# Patient Record
Sex: FEMALE | Race: WHITE | NOT HISPANIC OR LATINO | Employment: UNEMPLOYED | URBAN - METROPOLITAN AREA
[De-identification: names, ages, dates, MRNs, and addresses within clinical notes are randomized per-mention and may not be internally consistent; named-entity substitution may affect disease eponyms.]

---

## 2020-07-27 DIAGNOSIS — Z12.31 ENCOUNTER FOR SCREENING MAMMOGRAM FOR MALIGNANT NEOPLASM OF BREAST: Primary | ICD-10-CM

## 2020-07-28 ENCOUNTER — HOSPITAL ENCOUNTER (OUTPATIENT)
Dept: RADIOLOGY | Facility: HOSPITAL | Age: 60
Discharge: HOME | End: 2020-07-28
Attending: SPECIALIST
Payer: COMMERCIAL

## 2020-07-28 DIAGNOSIS — Z12.31 ENCOUNTER FOR SCREENING MAMMOGRAM FOR MALIGNANT NEOPLASM OF BREAST: ICD-10-CM

## 2020-07-28 PROCEDURE — 77067 SCR MAMMO BI INCL CAD: CPT | Mod: TC,PO

## 2020-09-28 DIAGNOSIS — M81.0 SENILE OSTEOPOROSIS: Primary | ICD-10-CM

## 2020-10-05 ENCOUNTER — HOSPITAL ENCOUNTER (OUTPATIENT)
Dept: RADIOLOGY | Facility: HOSPITAL | Age: 60
Discharge: HOME | End: 2020-10-05
Attending: INTERNAL MEDICINE

## 2020-10-05 DIAGNOSIS — M81.0 SENILE OSTEOPOROSIS: ICD-10-CM

## 2020-10-05 PROCEDURE — 77080 DXA BONE DENSITY AXIAL: CPT | Mod: TC,PO

## 2020-11-18 ENCOUNTER — OFFICE VISIT (OUTPATIENT)
Dept: CARDIOLOGY | Facility: CLINIC | Age: 60
End: 2020-11-18
Payer: COMMERCIAL

## 2020-11-18 VITALS
OXYGEN SATURATION: 99 % | RESPIRATION RATE: 18 BRPM | SYSTOLIC BLOOD PRESSURE: 120 MMHG | HEIGHT: 70 IN | DIASTOLIC BLOOD PRESSURE: 78 MMHG | HEART RATE: 72 BPM | BODY MASS INDEX: 23.91 KG/M2 | WEIGHT: 167 LBS

## 2020-11-18 DIAGNOSIS — I48.0 PAROXYSMAL ATRIAL FIBRILLATION: Primary | ICD-10-CM

## 2020-11-18 DIAGNOSIS — R00.8 VENTRICULAR BIGEMINY SEEN ON CARDIAC MONITOR: ICD-10-CM

## 2020-11-18 DIAGNOSIS — I34.1 MVP (MITRAL VALVE PROLAPSE): ICD-10-CM

## 2020-11-18 PROCEDURE — 3074F SYST BP LT 130 MM HG: CPT | Mod: CPTII,S$GLB,, | Performed by: INTERNAL MEDICINE

## 2020-11-18 PROCEDURE — 3074F PR MOST RECENT SYSTOLIC BLOOD PRESSURE < 130 MM HG: ICD-10-PCS | Mod: CPTII,S$GLB,, | Performed by: INTERNAL MEDICINE

## 2020-11-18 PROCEDURE — 99499 NO LOS: ICD-10-PCS | Mod: S$GLB,,, | Performed by: INTERNAL MEDICINE

## 2020-11-18 PROCEDURE — 99499 UNLISTED E&M SERVICE: CPT | Mod: S$GLB,,, | Performed by: INTERNAL MEDICINE

## 2020-11-18 PROCEDURE — 3008F PR BODY MASS INDEX (BMI) DOCUMENTED: ICD-10-PCS | Mod: CPTII,S$GLB,, | Performed by: INTERNAL MEDICINE

## 2020-11-18 PROCEDURE — 3008F BODY MASS INDEX DOCD: CPT | Mod: CPTII,S$GLB,, | Performed by: INTERNAL MEDICINE

## 2020-11-18 PROCEDURE — 3078F PR MOST RECENT DIASTOLIC BLOOD PRESSURE < 80 MM HG: ICD-10-PCS | Mod: CPTII,S$GLB,, | Performed by: INTERNAL MEDICINE

## 2020-11-18 PROCEDURE — 3078F DIAST BP <80 MM HG: CPT | Mod: CPTII,S$GLB,, | Performed by: INTERNAL MEDICINE

## 2020-11-18 RX ORDER — CALCIUM CARBONATE/VITAMIN D3 600 MG-20
TABLET,CHEWABLE ORAL
COMMUNITY

## 2020-11-18 RX ORDER — ALPRAZOLAM 0.25 MG/1
TABLET ORAL
COMMUNITY
Start: 2020-09-10 | End: 2021-09-03 | Stop reason: SDUPTHER

## 2020-11-18 RX ORDER — AMMONIUM LACTATE 12 G/100G
LOTION TOPICAL
COMMUNITY
End: 2021-09-03 | Stop reason: SDUPTHER

## 2020-11-18 RX ORDER — DIPHENHYDRAMINE HCL 25 MG
25 CAPSULE ORAL EVERY 6 HOURS PRN
COMMUNITY

## 2020-11-18 RX ORDER — ASPIRIN 81 MG/1
TABLET ORAL
COMMUNITY
End: 2022-03-21

## 2020-11-18 RX ORDER — METOPROLOL SUCCINATE 25 MG/1
25 TABLET, EXTENDED RELEASE ORAL DAILY
COMMUNITY
End: 2021-09-03 | Stop reason: SDUPTHER

## 2020-11-18 RX ORDER — ALENDRONATE SODIUM 70 MG/1
TABLET ORAL
COMMUNITY

## 2020-11-18 RX ORDER — AZELASTINE 1 MG/ML
SPRAY, METERED NASAL
COMMUNITY
End: 2021-09-03 | Stop reason: SDUPTHER

## 2020-11-18 RX ORDER — BEPOTASTINE BESILATE 15 MG/ML
SOLUTION/ DROPS OPHTHALMIC
COMMUNITY
Start: 2020-11-05

## 2020-11-19 ENCOUNTER — TELEPHONE (OUTPATIENT)
Dept: CARDIOLOGY | Facility: CLINIC | Age: 60
End: 2020-11-19

## 2020-11-19 NOTE — TELEPHONE ENCOUNTER
----- Message from Meaghan Mendez sent at 11/19/2020 10:07 AM CST -----  please give patient a call [phone]

## 2021-02-22 ENCOUNTER — OFFICE VISIT (OUTPATIENT)
Dept: CARDIOLOGY | Facility: CLINIC | Age: 61
End: 2021-02-22
Payer: COMMERCIAL

## 2021-02-22 VITALS
OXYGEN SATURATION: 98 % | DIASTOLIC BLOOD PRESSURE: 88 MMHG | BODY MASS INDEX: 24.62 KG/M2 | HEIGHT: 70 IN | HEART RATE: 66 BPM | SYSTOLIC BLOOD PRESSURE: 124 MMHG | RESPIRATION RATE: 18 BRPM | WEIGHT: 172 LBS

## 2021-02-22 DIAGNOSIS — I34.1 MITRAL VALVE PROLAPSE: ICD-10-CM

## 2021-02-22 DIAGNOSIS — I48.0 PAROXYSMAL ATRIAL FIBRILLATION: Primary | ICD-10-CM

## 2021-02-22 DIAGNOSIS — I48.0 PAF (PAROXYSMAL ATRIAL FIBRILLATION): ICD-10-CM

## 2021-02-22 PROCEDURE — 99213 OFFICE O/P EST LOW 20 MIN: CPT | Mod: S$GLB,,, | Performed by: INTERNAL MEDICINE

## 2021-02-22 PROCEDURE — 93000 ELECTROCARDIOGRAM COMPLETE: CPT | Mod: S$GLB,,, | Performed by: INTERNAL MEDICINE

## 2021-02-22 PROCEDURE — 3008F BODY MASS INDEX DOCD: CPT | Mod: CPTII,S$GLB,, | Performed by: INTERNAL MEDICINE

## 2021-02-22 PROCEDURE — 99213 PR OFFICE/OUTPT VISIT, EST, LEVL III, 20-29 MIN: ICD-10-PCS | Mod: S$GLB,,, | Performed by: INTERNAL MEDICINE

## 2021-02-22 PROCEDURE — 3008F PR BODY MASS INDEX (BMI) DOCUMENTED: ICD-10-PCS | Mod: CPTII,S$GLB,, | Performed by: INTERNAL MEDICINE

## 2021-02-22 PROCEDURE — 93000 EKG 12-LEAD: ICD-10-PCS | Mod: S$GLB,,, | Performed by: INTERNAL MEDICINE

## 2021-05-03 ENCOUNTER — TELEPHONE (OUTPATIENT)
Dept: FAMILY MEDICINE | Facility: CLINIC | Age: 61
End: 2021-05-03

## 2021-06-07 ENCOUNTER — TELEPHONE (OUTPATIENT)
Dept: FAMILY MEDICINE | Facility: CLINIC | Age: 61
End: 2021-06-07

## 2021-06-07 DIAGNOSIS — Z91.09 MULTIPLE ENVIRONMENTAL ALLERGIES: Primary | ICD-10-CM

## 2021-06-07 RX ORDER — MONTELUKAST SODIUM 10 MG/1
1 TABLET ORAL DAILY
COMMUNITY
Start: 2019-02-01 | End: 2021-06-07 | Stop reason: SDUPTHER

## 2021-06-07 RX ORDER — MONTELUKAST SODIUM 10 MG/1
10 TABLET ORAL DAILY
Qty: 30 TABLET | Refills: 0 | Status: SHIPPED | OUTPATIENT
Start: 2021-06-07 | End: 2021-09-03 | Stop reason: SDUPTHER

## 2021-08-16 LAB
HUMAN PAPILLOMAVIRUS (HPV): NORMAL
PAP SMEAR: NORMAL

## 2021-09-01 ENCOUNTER — TELEPHONE (OUTPATIENT)
Dept: FAMILY MEDICINE | Facility: CLINIC | Age: 61
End: 2021-09-01

## 2021-09-03 ENCOUNTER — TELEPHONE (OUTPATIENT)
Dept: FAMILY MEDICINE | Facility: CLINIC | Age: 61
End: 2021-09-03

## 2021-09-03 ENCOUNTER — OFFICE VISIT (OUTPATIENT)
Dept: FAMILY MEDICINE | Facility: CLINIC | Age: 61
End: 2021-09-03

## 2021-09-03 VITALS — OXYGEN SATURATION: 99 % | TEMPERATURE: 97 F | SYSTOLIC BLOOD PRESSURE: 115 MMHG | DIASTOLIC BLOOD PRESSURE: 69 MMHG

## 2021-09-03 DIAGNOSIS — L85.3 DRY SKIN: ICD-10-CM

## 2021-09-03 DIAGNOSIS — M54.31 SCIATICA OF RIGHT SIDE: ICD-10-CM

## 2021-09-03 DIAGNOSIS — I34.1 MITRAL VALVE PROLAPSE: ICD-10-CM

## 2021-09-03 DIAGNOSIS — M81.0 AGE-RELATED OSTEOPOROSIS WITHOUT CURRENT PATHOLOGICAL FRACTURE: ICD-10-CM

## 2021-09-03 DIAGNOSIS — K21.9 GASTROESOPHAGEAL REFLUX DISEASE WITHOUT ESOPHAGITIS: ICD-10-CM

## 2021-09-03 DIAGNOSIS — E05.90 HYPERTHYROIDISM: ICD-10-CM

## 2021-09-03 DIAGNOSIS — J30.2 SEASONAL ALLERGIES: ICD-10-CM

## 2021-09-03 DIAGNOSIS — F41.0 PANIC DISORDER: ICD-10-CM

## 2021-09-03 DIAGNOSIS — I48.0 PAF (PAROXYSMAL ATRIAL FIBRILLATION): Primary | ICD-10-CM

## 2021-09-03 PROBLEM — Z80.0 FAMILY HISTORY OF COLON CANCER: Status: ACTIVE | Noted: 2018-04-24

## 2021-09-03 PROCEDURE — 1160F RVW MEDS BY RX/DR IN RCRD: CPT | Mod: ,,, | Performed by: STUDENT IN AN ORGANIZED HEALTH CARE EDUCATION/TRAINING PROGRAM

## 2021-09-03 PROCEDURE — 99204 PR OFFICE/OUTPT VISIT, NEW, LEVL IV, 45-59 MIN: ICD-10-PCS | Mod: 95,,, | Performed by: STUDENT IN AN ORGANIZED HEALTH CARE EDUCATION/TRAINING PROGRAM

## 2021-09-03 PROCEDURE — 3074F SYST BP LT 130 MM HG: CPT | Mod: ,,, | Performed by: STUDENT IN AN ORGANIZED HEALTH CARE EDUCATION/TRAINING PROGRAM

## 2021-09-03 PROCEDURE — 1159F PR MEDICATION LIST DOCUMENTED IN MEDICAL RECORD: ICD-10-PCS | Mod: ,,, | Performed by: STUDENT IN AN ORGANIZED HEALTH CARE EDUCATION/TRAINING PROGRAM

## 2021-09-03 PROCEDURE — 3078F PR MOST RECENT DIASTOLIC BLOOD PRESSURE < 80 MM HG: ICD-10-PCS | Mod: ,,, | Performed by: STUDENT IN AN ORGANIZED HEALTH CARE EDUCATION/TRAINING PROGRAM

## 2021-09-03 PROCEDURE — 3078F DIAST BP <80 MM HG: CPT | Mod: ,,, | Performed by: STUDENT IN AN ORGANIZED HEALTH CARE EDUCATION/TRAINING PROGRAM

## 2021-09-03 PROCEDURE — 1160F PR REVIEW ALL MEDS BY PRESCRIBER/CLIN PHARMACIST DOCUMENTED: ICD-10-PCS | Mod: ,,, | Performed by: STUDENT IN AN ORGANIZED HEALTH CARE EDUCATION/TRAINING PROGRAM

## 2021-09-03 PROCEDURE — 99204 OFFICE O/P NEW MOD 45 MIN: CPT | Mod: 95,,, | Performed by: STUDENT IN AN ORGANIZED HEALTH CARE EDUCATION/TRAINING PROGRAM

## 2021-09-03 PROCEDURE — 1159F MED LIST DOCD IN RCRD: CPT | Mod: ,,, | Performed by: STUDENT IN AN ORGANIZED HEALTH CARE EDUCATION/TRAINING PROGRAM

## 2021-09-03 PROCEDURE — 3074F PR MOST RECENT SYSTOLIC BLOOD PRESSURE < 130 MM HG: ICD-10-PCS | Mod: ,,, | Performed by: STUDENT IN AN ORGANIZED HEALTH CARE EDUCATION/TRAINING PROGRAM

## 2021-09-03 RX ORDER — MONTELUKAST SODIUM 10 MG/1
10 TABLET ORAL DAILY
Qty: 30 TABLET | Refills: 0 | Status: SHIPPED | OUTPATIENT
Start: 2021-09-03 | End: 2021-10-28

## 2021-09-03 RX ORDER — METOPROLOL SUCCINATE 25 MG/1
25 TABLET, EXTENDED RELEASE ORAL DAILY
Qty: 90 TABLET | Refills: 3 | Status: SHIPPED | OUTPATIENT
Start: 2021-09-03 | End: 2022-09-05 | Stop reason: SDUPTHER

## 2021-09-03 RX ORDER — OMEPRAZOLE 40 MG/1
40 CAPSULE, DELAYED RELEASE ORAL DAILY PRN
Qty: 90 CAPSULE | Refills: 3 | Status: SHIPPED | OUTPATIENT
Start: 2021-09-03 | End: 2022-12-09 | Stop reason: SDUPTHER

## 2021-09-03 RX ORDER — ALPRAZOLAM 0.25 MG/1
0.25 TABLET ORAL DAILY PRN
Qty: 30 TABLET | Refills: 0 | Status: SHIPPED | OUTPATIENT
Start: 2021-09-03 | End: 2022-02-07 | Stop reason: SDUPTHER

## 2021-09-03 RX ORDER — AZELASTINE 1 MG/ML
2 SPRAY, METERED NASAL 2 TIMES DAILY
Qty: 30 ML | Refills: 3 | Status: SHIPPED | OUTPATIENT
Start: 2021-09-03 | End: 2022-07-21 | Stop reason: SDUPTHER

## 2021-09-03 RX ORDER — AMMONIUM LACTATE 12 G/100G
LOTION TOPICAL 2 TIMES DAILY PRN
Qty: 225 G | Refills: 0 | Status: SHIPPED | OUTPATIENT
Start: 2021-09-03 | End: 2022-06-13 | Stop reason: SDUPTHER

## 2021-09-03 RX ORDER — OMEPRAZOLE 40 MG/1
1 CAPSULE, DELAYED RELEASE ORAL DAILY PRN
COMMUNITY
End: 2021-09-03 | Stop reason: SDUPTHER

## 2021-09-07 ENCOUNTER — TELEPHONE (OUTPATIENT)
Dept: CARDIOLOGY | Facility: CLINIC | Age: 61
End: 2021-09-07

## 2021-09-08 DIAGNOSIS — Z12.31 OTHER SCREENING MAMMOGRAM: ICD-10-CM

## 2021-09-20 ENCOUNTER — LAB VISIT (OUTPATIENT)
Dept: LAB | Facility: HOSPITAL | Age: 61
End: 2021-09-20
Attending: INTERNAL MEDICINE
Payer: COMMERCIAL

## 2021-09-20 ENCOUNTER — OFFICE VISIT (OUTPATIENT)
Dept: CARDIOLOGY | Facility: CLINIC | Age: 61
End: 2021-09-20
Payer: COMMERCIAL

## 2021-09-20 VITALS
WEIGHT: 171 LBS | DIASTOLIC BLOOD PRESSURE: 74 MMHG | OXYGEN SATURATION: 99 % | HEART RATE: 77 BPM | HEIGHT: 70 IN | SYSTOLIC BLOOD PRESSURE: 128 MMHG | BODY MASS INDEX: 24.48 KG/M2

## 2021-09-20 DIAGNOSIS — Z79.899 ENCOUNTER FOR LONG-TERM (CURRENT) USE OF OTHER MEDICATIONS: ICD-10-CM

## 2021-09-20 DIAGNOSIS — F41.0 PANIC DISORDER: ICD-10-CM

## 2021-09-20 DIAGNOSIS — I48.0 PAROXYSMAL ATRIAL FIBRILLATION: ICD-10-CM

## 2021-09-20 DIAGNOSIS — I34.1 MITRAL VALVE PROLAPSE: ICD-10-CM

## 2021-09-20 DIAGNOSIS — E55.9 AVITAMINOSIS D: ICD-10-CM

## 2021-09-20 DIAGNOSIS — K21.9 GASTROESOPHAGEAL REFLUX DISEASE WITHOUT ESOPHAGITIS: ICD-10-CM

## 2021-09-20 DIAGNOSIS — M81.0 SENILE OSTEOPOROSIS: Primary | ICD-10-CM

## 2021-09-20 DIAGNOSIS — I48.0 PAROXYSMAL ATRIAL FIBRILLATION: Primary | ICD-10-CM

## 2021-09-20 DIAGNOSIS — E05.90 HYPERTHYROIDISM: ICD-10-CM

## 2021-09-20 LAB
25(OH)D3+25(OH)D2 SERPL-MCNC: 53 NG/ML (ref 30–96)
ANION GAP SERPL CALC-SCNC: 10 MMOL/L (ref 8–16)
BUN SERPL-MCNC: 15 MG/DL (ref 6–20)
CALCIUM SERPL-MCNC: 9.6 MG/DL (ref 8.7–10.5)
CHLORIDE SERPL-SCNC: 105 MMOL/L (ref 95–110)
CO2 SERPL-SCNC: 26 MMOL/L (ref 23–29)
CREAT SERPL-MCNC: 0.7 MG/DL (ref 0.5–1.4)
EST. GFR  (AFRICAN AMERICAN): >60 ML/MIN/1.73 M^2
EST. GFR  (NON AFRICAN AMERICAN): >60 ML/MIN/1.73 M^2
GLUCOSE SERPL-MCNC: 101 MG/DL (ref 70–110)
MAGNESIUM SERPL-MCNC: 2.2 MG/DL (ref 1.6–2.6)
POTASSIUM SERPL-SCNC: 4.8 MMOL/L (ref 3.5–5.1)
SODIUM SERPL-SCNC: 141 MMOL/L (ref 136–145)
TSH SERPL DL<=0.005 MIU/L-ACNC: 1.59 UIU/ML (ref 0.34–5.6)

## 2021-09-20 PROCEDURE — 1160F PR REVIEW ALL MEDS BY PRESCRIBER/CLIN PHARMACIST DOCUMENTED: ICD-10-PCS | Mod: CPTII,S$GLB,, | Performed by: INTERNAL MEDICINE

## 2021-09-20 PROCEDURE — 3074F PR MOST RECENT SYSTOLIC BLOOD PRESSURE < 130 MM HG: ICD-10-PCS | Mod: CPTII,S$GLB,, | Performed by: INTERNAL MEDICINE

## 2021-09-20 PROCEDURE — 1160F RVW MEDS BY RX/DR IN RCRD: CPT | Mod: CPTII,S$GLB,, | Performed by: INTERNAL MEDICINE

## 2021-09-20 PROCEDURE — 3078F DIAST BP <80 MM HG: CPT | Mod: CPTII,S$GLB,, | Performed by: INTERNAL MEDICINE

## 2021-09-20 PROCEDURE — 80048 BASIC METABOLIC PNL TOTAL CA: CPT | Performed by: INTERNAL MEDICINE

## 2021-09-20 PROCEDURE — 3008F BODY MASS INDEX DOCD: CPT | Mod: CPTII,S$GLB,, | Performed by: INTERNAL MEDICINE

## 2021-09-20 PROCEDURE — 93000 EKG 12-LEAD: ICD-10-PCS | Mod: S$GLB,,, | Performed by: INTERNAL MEDICINE

## 2021-09-20 PROCEDURE — 3078F PR MOST RECENT DIASTOLIC BLOOD PRESSURE < 80 MM HG: ICD-10-PCS | Mod: CPTII,S$GLB,, | Performed by: INTERNAL MEDICINE

## 2021-09-20 PROCEDURE — 83735 ASSAY OF MAGNESIUM: CPT | Performed by: INTERNAL MEDICINE

## 2021-09-20 PROCEDURE — 1159F PR MEDICATION LIST DOCUMENTED IN MEDICAL RECORD: ICD-10-PCS | Mod: CPTII,S$GLB,, | Performed by: INTERNAL MEDICINE

## 2021-09-20 PROCEDURE — 3074F SYST BP LT 130 MM HG: CPT | Mod: CPTII,S$GLB,, | Performed by: INTERNAL MEDICINE

## 2021-09-20 PROCEDURE — 84443 ASSAY THYROID STIM HORMONE: CPT | Performed by: INTERNAL MEDICINE

## 2021-09-20 PROCEDURE — 82306 VITAMIN D 25 HYDROXY: CPT | Performed by: INTERNAL MEDICINE

## 2021-09-20 PROCEDURE — 36415 COLL VENOUS BLD VENIPUNCTURE: CPT | Performed by: INTERNAL MEDICINE

## 2021-09-20 PROCEDURE — 1159F MED LIST DOCD IN RCRD: CPT | Mod: CPTII,S$GLB,, | Performed by: INTERNAL MEDICINE

## 2021-09-20 PROCEDURE — 3008F PR BODY MASS INDEX (BMI) DOCUMENTED: ICD-10-PCS | Mod: CPTII,S$GLB,, | Performed by: INTERNAL MEDICINE

## 2021-09-20 PROCEDURE — 93000 ELECTROCARDIOGRAM COMPLETE: CPT | Mod: S$GLB,,, | Performed by: INTERNAL MEDICINE

## 2021-09-20 PROCEDURE — 99214 OFFICE O/P EST MOD 30 MIN: CPT | Mod: S$GLB,,, | Performed by: INTERNAL MEDICINE

## 2021-09-20 PROCEDURE — 99214 PR OFFICE/OUTPT VISIT, EST, LEVL IV, 30-39 MIN: ICD-10-PCS | Mod: S$GLB,,, | Performed by: INTERNAL MEDICINE

## 2021-09-20 RX ORDER — FLECAINIDE ACETATE 100 MG/1
100 TABLET ORAL DAILY
Qty: 30 TABLET | Refills: 3 | Status: SHIPPED | OUTPATIENT
Start: 2021-09-20 | End: 2022-11-03 | Stop reason: SDUPTHER

## 2021-09-20 RX ORDER — FLECAINIDE ACETATE 100 MG/1
100 TABLET ORAL
COMMUNITY
End: 2022-09-12 | Stop reason: SDUPTHER

## 2021-09-21 ENCOUNTER — LAB VISIT (OUTPATIENT)
Dept: LAB | Facility: HOSPITAL | Age: 61
End: 2021-09-21
Attending: INTERNAL MEDICINE
Payer: COMMERCIAL

## 2021-09-21 DIAGNOSIS — R14.2 ERUCTATION: Primary | ICD-10-CM

## 2021-09-21 PROCEDURE — 87338 HPYLORI STOOL AG IA: CPT | Performed by: INTERNAL MEDICINE

## 2021-09-24 LAB — H PYLORI AG STL QL IA: NEGATIVE

## 2021-10-05 DIAGNOSIS — M81.0 SENILE OSTEOPOROSIS: Primary | ICD-10-CM

## 2021-10-06 ENCOUNTER — HOSPITAL ENCOUNTER (OUTPATIENT)
Dept: RADIOLOGY | Facility: HOSPITAL | Age: 61
Discharge: HOME | End: 2021-10-06
Attending: INTERNAL MEDICINE
Payer: COMMERCIAL

## 2021-10-06 DIAGNOSIS — M81.0 SENILE OSTEOPOROSIS: ICD-10-CM

## 2021-10-06 PROCEDURE — 77080 DXA BONE DENSITY AXIAL: CPT | Mod: TC,PO

## 2021-10-07 ENCOUNTER — PATIENT MESSAGE (OUTPATIENT)
Dept: ADMINISTRATIVE | Facility: HOSPITAL | Age: 61
End: 2021-10-07

## 2021-10-11 DIAGNOSIS — M81.0 SENILE OSTEOPOROSIS: Primary | ICD-10-CM

## 2021-10-30 ENCOUNTER — OFFICE VISIT (OUTPATIENT)
Dept: URGENT CARE | Facility: CLINIC | Age: 61
End: 2021-10-30
Payer: COMMERCIAL

## 2021-10-30 VITALS
TEMPERATURE: 98 F | RESPIRATION RATE: 16 BRPM | BODY MASS INDEX: 24.48 KG/M2 | HEIGHT: 70 IN | HEART RATE: 69 BPM | OXYGEN SATURATION: 97 % | WEIGHT: 171 LBS | SYSTOLIC BLOOD PRESSURE: 125 MMHG | DIASTOLIC BLOOD PRESSURE: 76 MMHG

## 2021-10-30 DIAGNOSIS — R30.0 DYSURIA: ICD-10-CM

## 2021-10-30 DIAGNOSIS — M62.838 MUSCLE SPASM: ICD-10-CM

## 2021-10-30 DIAGNOSIS — M54.50 ACUTE BILATERAL LOW BACK PAIN WITHOUT SCIATICA: Primary | ICD-10-CM

## 2021-10-30 LAB
BILIRUB UR QL STRIP: NEGATIVE
GLUCOSE UR QL STRIP: NEGATIVE
KETONES UR QL STRIP: NEGATIVE
LEUKOCYTE ESTERASE UR QL STRIP: NEGATIVE
PH, POC UA: 6
POC BLOOD, URINE: NEGATIVE
POC NITRATES, URINE: NEGATIVE
PROT UR QL STRIP: NEGATIVE
SP GR UR STRIP: 1.01 (ref 1–1.03)
UROBILINOGEN UR STRIP-ACNC: NORMAL (ref 0.1–1.1)

## 2021-10-30 PROCEDURE — 3074F SYST BP LT 130 MM HG: CPT | Mod: S$GLB,,, | Performed by: NURSE PRACTITIONER

## 2021-10-30 PROCEDURE — 3078F DIAST BP <80 MM HG: CPT | Mod: S$GLB,,, | Performed by: NURSE PRACTITIONER

## 2021-10-30 PROCEDURE — 3078F PR MOST RECENT DIASTOLIC BLOOD PRESSURE < 80 MM HG: ICD-10-PCS | Mod: S$GLB,,, | Performed by: NURSE PRACTITIONER

## 2021-10-30 PROCEDURE — 3008F BODY MASS INDEX DOCD: CPT | Mod: S$GLB,,, | Performed by: NURSE PRACTITIONER

## 2021-10-30 PROCEDURE — 81003 URINALYSIS AUTO W/O SCOPE: CPT | Mod: QW,S$GLB,, | Performed by: NURSE PRACTITIONER

## 2021-10-30 PROCEDURE — 3074F PR MOST RECENT SYSTOLIC BLOOD PRESSURE < 130 MM HG: ICD-10-PCS | Mod: S$GLB,,, | Performed by: NURSE PRACTITIONER

## 2021-10-30 PROCEDURE — 3008F PR BODY MASS INDEX (BMI) DOCUMENTED: ICD-10-PCS | Mod: S$GLB,,, | Performed by: NURSE PRACTITIONER

## 2021-10-30 PROCEDURE — 99213 PR OFFICE/OUTPT VISIT, EST, LEVL III, 20-29 MIN: ICD-10-PCS | Mod: S$GLB,,, | Performed by: NURSE PRACTITIONER

## 2021-10-30 PROCEDURE — 99213 OFFICE O/P EST LOW 20 MIN: CPT | Mod: S$GLB,,, | Performed by: NURSE PRACTITIONER

## 2021-10-30 PROCEDURE — 1159F PR MEDICATION LIST DOCUMENTED IN MEDICAL RECORD: ICD-10-PCS | Mod: S$GLB,,, | Performed by: NURSE PRACTITIONER

## 2021-10-30 PROCEDURE — 81003 POCT URINALYSIS, DIPSTICK, AUTOMATED, W/O SCOPE: ICD-10-PCS | Mod: QW,S$GLB,, | Performed by: NURSE PRACTITIONER

## 2021-10-30 PROCEDURE — 1159F MED LIST DOCD IN RCRD: CPT | Mod: S$GLB,,, | Performed by: NURSE PRACTITIONER

## 2021-10-30 RX ORDER — NAPROXEN 500 MG/1
500 TABLET ORAL 2 TIMES DAILY
Qty: 14 TABLET | Refills: 0 | Status: SHIPPED | OUTPATIENT
Start: 2021-10-30 | End: 2021-11-06

## 2021-10-30 RX ORDER — METHOCARBAMOL 750 MG/1
750 TABLET, FILM COATED ORAL 3 TIMES DAILY
Qty: 21 TABLET | Refills: 0 | Status: SHIPPED | OUTPATIENT
Start: 2021-10-30 | End: 2021-11-06

## 2021-12-13 ENCOUNTER — OFFICE VISIT (OUTPATIENT)
Dept: FAMILY MEDICINE | Facility: CLINIC | Age: 61
End: 2021-12-13
Payer: COMMERCIAL

## 2021-12-13 VITALS
TEMPERATURE: 98 F | WEIGHT: 169 LBS | DIASTOLIC BLOOD PRESSURE: 68 MMHG | HEART RATE: 68 BPM | SYSTOLIC BLOOD PRESSURE: 108 MMHG | OXYGEN SATURATION: 99 % | BODY MASS INDEX: 24.2 KG/M2 | HEIGHT: 70 IN

## 2021-12-13 DIAGNOSIS — G89.29 CHRONIC PAIN OF RIGHT KNEE: ICD-10-CM

## 2021-12-13 DIAGNOSIS — Z13.220 ENCOUNTER FOR LIPID SCREENING FOR CARDIOVASCULAR DISEASE: ICD-10-CM

## 2021-12-13 DIAGNOSIS — Z13.6 ENCOUNTER FOR LIPID SCREENING FOR CARDIOVASCULAR DISEASE: ICD-10-CM

## 2021-12-13 DIAGNOSIS — M25.561 CHRONIC PAIN OF RIGHT KNEE: ICD-10-CM

## 2021-12-13 DIAGNOSIS — Z11.59 ENCOUNTER FOR HEPATITIS C SCREENING TEST FOR LOW RISK PATIENT: ICD-10-CM

## 2021-12-13 DIAGNOSIS — R30.0 DYSURIA: ICD-10-CM

## 2021-12-13 DIAGNOSIS — F41.0 PANIC DISORDER: Primary | ICD-10-CM

## 2021-12-13 DIAGNOSIS — I48.0 PAROXYSMAL ATRIAL FIBRILLATION: ICD-10-CM

## 2021-12-13 DIAGNOSIS — Z11.4 SCREENING FOR HIV (HUMAN IMMUNODEFICIENCY VIRUS): ICD-10-CM

## 2021-12-13 DIAGNOSIS — Z79.899 LONG-TERM CURRENT USE OF BENZODIAZEPINE: ICD-10-CM

## 2021-12-13 LAB
AMP AMPHETAMINE 1000 NM/ML POC: NEGATIVE
BAR BARBITURATES 300 NG/ML POC: NEGATIVE
BILIRUB SERPL-MCNC: NEGATIVE MG/DL
BLOOD URINE, POC: ABNORMAL
BUP BUPRENORPHINE 10 NG/ML POC: NEGATIVE
BZO BENZODIAZEPINES 300 NG/ML POC: NEGATIVE
CLARITY, POC UA: CLEAR
COC COCAINE 300 NG/ML POC: NEGATIVE
COLOR, POC UA: YELLOW
CREATININE (CR) POC: 100
CTP QC/QA: YES
GLUCOSE UR QL STRIP: NEGATIVE
KETONES UR QL STRIP: NEGATIVE
LEUKOCYTE ESTERASE URINE, POC: ABNORMAL
MET METHAMPHETAMINE 1000 NG/ML POC: NEGATIVE
MOP/OPI300 MORPHINE 300 NG/ML POC: NEGATIVE
MTD METHADONE 300 NG/ML POC: NEGATIVE
NITRITE, POC UA: NEGATIVE
OXIDANT (OX) POC: NEGATIVE
OXY OXYCODONE 100 NG/ML POC: NEGATIVE
PH, POC UA: 6.5
PROTEIN, POC: NEGATIVE
SPECIFIC GRAVITY (SG) POC: 1.02
SPECIFIC GRAVITY, POC UA: 1.02
TEMPERATURE (°F) POC: 92
THC MARIJUANA 50 NG/ML POC: NEGATIVE
UROBILINOGEN, POC UA: 0.2

## 2021-12-13 PROCEDURE — 80305 POCT URINE DRUG SCREEN (WITH BUP): ICD-10-PCS | Mod: QW,S$GLB,, | Performed by: STUDENT IN AN ORGANIZED HEALTH CARE EDUCATION/TRAINING PROGRAM

## 2021-12-13 PROCEDURE — 80305 DRUG TEST PRSMV DIR OPT OBS: CPT | Mod: QW,S$GLB,, | Performed by: STUDENT IN AN ORGANIZED HEALTH CARE EDUCATION/TRAINING PROGRAM

## 2021-12-13 PROCEDURE — 81002 URINALYSIS NONAUTO W/O SCOPE: CPT | Mod: 59,S$GLB,, | Performed by: STUDENT IN AN ORGANIZED HEALTH CARE EDUCATION/TRAINING PROGRAM

## 2021-12-13 PROCEDURE — 81002 POCT URINE DIPSTICK WITHOUT MICROSCOPE: ICD-10-PCS | Mod: 59,S$GLB,, | Performed by: STUDENT IN AN ORGANIZED HEALTH CARE EDUCATION/TRAINING PROGRAM

## 2021-12-13 PROCEDURE — 99214 OFFICE O/P EST MOD 30 MIN: CPT | Mod: S$GLB,,, | Performed by: STUDENT IN AN ORGANIZED HEALTH CARE EDUCATION/TRAINING PROGRAM

## 2021-12-13 PROCEDURE — 99214 PR OFFICE/OUTPT VISIT, EST, LEVL IV, 30-39 MIN: ICD-10-PCS | Mod: S$GLB,,, | Performed by: STUDENT IN AN ORGANIZED HEALTH CARE EDUCATION/TRAINING PROGRAM

## 2021-12-15 ENCOUNTER — PATIENT OUTREACH (OUTPATIENT)
Dept: ADMINISTRATIVE | Facility: HOSPITAL | Age: 61
End: 2021-12-15
Payer: COMMERCIAL

## 2021-12-15 ENCOUNTER — HOSPITAL ENCOUNTER (OUTPATIENT)
Dept: RADIOLOGY | Facility: HOSPITAL | Age: 61
Discharge: HOME | End: 2021-12-15
Attending: STUDENT IN AN ORGANIZED HEALTH CARE EDUCATION/TRAINING PROGRAM
Payer: COMMERCIAL

## 2021-12-15 DIAGNOSIS — Z12.31 OTHER SCREENING MAMMOGRAM: ICD-10-CM

## 2021-12-15 PROCEDURE — 77067 SCR MAMMO BI INCL CAD: CPT | Mod: TC,PO

## 2021-12-20 ENCOUNTER — LAB VISIT (OUTPATIENT)
Dept: LAB | Facility: CLINIC | Age: 61
End: 2021-12-20
Payer: COMMERCIAL

## 2021-12-20 DIAGNOSIS — I48.0 PAROXYSMAL ATRIAL FIBRILLATION: ICD-10-CM

## 2021-12-20 DIAGNOSIS — Z13.220 ENCOUNTER FOR LIPID SCREENING FOR CARDIOVASCULAR DISEASE: ICD-10-CM

## 2021-12-20 DIAGNOSIS — Z11.4 SCREENING FOR HIV (HUMAN IMMUNODEFICIENCY VIRUS): ICD-10-CM

## 2021-12-20 DIAGNOSIS — Z11.59 ENCOUNTER FOR HEPATITIS C SCREENING TEST FOR LOW RISK PATIENT: ICD-10-CM

## 2021-12-20 DIAGNOSIS — Z13.6 ENCOUNTER FOR LIPID SCREENING FOR CARDIOVASCULAR DISEASE: ICD-10-CM

## 2021-12-20 LAB
ALBUMIN SERPL BCP-MCNC: 4.3 G/DL (ref 3.5–5.2)
ALP SERPL-CCNC: 56 U/L (ref 55–135)
ALT SERPL W/O P-5'-P-CCNC: 20 U/L (ref 10–44)
ANION GAP SERPL CALC-SCNC: 9 MMOL/L (ref 8–16)
AST SERPL-CCNC: 25 U/L (ref 10–40)
BASOPHILS # BLD AUTO: 0.02 K/UL (ref 0–0.2)
BASOPHILS NFR BLD: 0.4 % (ref 0–1.9)
BILIRUB SERPL-MCNC: 0.8 MG/DL (ref 0.1–1)
BUN SERPL-MCNC: 14 MG/DL (ref 8–23)
CALCIUM SERPL-MCNC: 9.4 MG/DL (ref 8.7–10.5)
CHLORIDE SERPL-SCNC: 106 MMOL/L (ref 95–110)
CHOLEST SERPL-MCNC: 164 MG/DL (ref 120–199)
CHOLEST/HDLC SERPL: 3 {RATIO} (ref 2–5)
CO2 SERPL-SCNC: 25 MMOL/L (ref 23–29)
CREAT SERPL-MCNC: 0.8 MG/DL (ref 0.5–1.4)
DIFFERENTIAL METHOD: NORMAL
EOSINOPHIL # BLD AUTO: 0.1 K/UL (ref 0–0.5)
EOSINOPHIL NFR BLD: 1.9 % (ref 0–8)
ERYTHROCYTE [DISTWIDTH] IN BLOOD BY AUTOMATED COUNT: 12.4 % (ref 11.5–14.5)
EST. GFR  (AFRICAN AMERICAN): >60 ML/MIN/1.73 M^2
EST. GFR  (NON AFRICAN AMERICAN): >60 ML/MIN/1.73 M^2
GLUCOSE SERPL-MCNC: 94 MG/DL (ref 70–110)
HCT VFR BLD AUTO: 40.6 % (ref 37–48.5)
HDLC SERPL-MCNC: 55 MG/DL (ref 40–75)
HDLC SERPL: 33.5 % (ref 20–50)
HGB BLD-MCNC: 13 G/DL (ref 12–16)
IMM GRANULOCYTES # BLD AUTO: 0.02 K/UL (ref 0–0.04)
IMM GRANULOCYTES NFR BLD AUTO: 0.4 % (ref 0–0.5)
LDLC SERPL CALC-MCNC: 99 MG/DL (ref 63–159)
LYMPHOCYTES # BLD AUTO: 1.6 K/UL (ref 1–4.8)
LYMPHOCYTES NFR BLD: 34.7 % (ref 18–48)
MCH RBC QN AUTO: 30.8 PG (ref 27–31)
MCHC RBC AUTO-ENTMCNC: 32 G/DL (ref 32–36)
MCV RBC AUTO: 96 FL (ref 82–98)
MONOCYTES # BLD AUTO: 0.3 K/UL (ref 0.3–1)
MONOCYTES NFR BLD: 7.3 % (ref 4–15)
NEUTROPHILS # BLD AUTO: 2.6 K/UL (ref 1.8–7.7)
NEUTROPHILS NFR BLD: 55.3 % (ref 38–73)
NONHDLC SERPL-MCNC: 109 MG/DL
NRBC BLD-RTO: 0 /100 WBC
PLATELET # BLD AUTO: 164 K/UL (ref 150–450)
PMV BLD AUTO: 10.5 FL (ref 9.2–12.9)
POTASSIUM SERPL-SCNC: 4.4 MMOL/L (ref 3.5–5.1)
PROT SERPL-MCNC: 7.5 G/DL (ref 6–8.4)
RBC # BLD AUTO: 4.22 M/UL (ref 4–5.4)
SODIUM SERPL-SCNC: 140 MMOL/L (ref 136–145)
TRIGL SERPL-MCNC: 50 MG/DL (ref 30–150)
WBC # BLD AUTO: 4.67 K/UL (ref 3.9–12.7)

## 2021-12-20 PROCEDURE — 87389 HIV-1 AG W/HIV-1&-2 AB AG IA: CPT | Performed by: STUDENT IN AN ORGANIZED HEALTH CARE EDUCATION/TRAINING PROGRAM

## 2021-12-20 PROCEDURE — 85025 COMPLETE CBC W/AUTO DIFF WBC: CPT | Performed by: STUDENT IN AN ORGANIZED HEALTH CARE EDUCATION/TRAINING PROGRAM

## 2021-12-20 PROCEDURE — 80053 COMPREHEN METABOLIC PANEL: CPT | Performed by: STUDENT IN AN ORGANIZED HEALTH CARE EDUCATION/TRAINING PROGRAM

## 2021-12-20 PROCEDURE — 86803 HEPATITIS C AB TEST: CPT | Performed by: STUDENT IN AN ORGANIZED HEALTH CARE EDUCATION/TRAINING PROGRAM

## 2021-12-20 PROCEDURE — 80061 LIPID PANEL: CPT | Performed by: STUDENT IN AN ORGANIZED HEALTH CARE EDUCATION/TRAINING PROGRAM

## 2021-12-21 LAB
HCV AB SERPL QL IA: NEGATIVE
HIV 1+2 AB+HIV1 P24 AG SERPL QL IA: NEGATIVE

## 2022-01-03 ENCOUNTER — PATIENT MESSAGE (OUTPATIENT)
Dept: FAMILY MEDICINE | Facility: CLINIC | Age: 62
End: 2022-01-03
Payer: COMMERCIAL

## 2022-01-03 DIAGNOSIS — R50.9 FEVER, UNSPECIFIED FEVER CAUSE: ICD-10-CM

## 2022-01-03 DIAGNOSIS — R05.9 COUGH: Primary | ICD-10-CM

## 2022-01-03 DIAGNOSIS — R09.81 NASAL SINUS CONGESTION: ICD-10-CM

## 2022-01-03 PROCEDURE — 87502 INFLUENZA DNA AMP PROBE: CPT | Mod: QW,S$GLB,, | Performed by: STUDENT IN AN ORGANIZED HEALTH CARE EDUCATION/TRAINING PROGRAM

## 2022-01-03 PROCEDURE — U0002: ICD-10-PCS | Mod: QW,S$GLB,, | Performed by: STUDENT IN AN ORGANIZED HEALTH CARE EDUCATION/TRAINING PROGRAM

## 2022-01-03 PROCEDURE — 87502 POCT INFLUENZA A/B MOLECULAR: ICD-10-PCS | Mod: QW,S$GLB,, | Performed by: STUDENT IN AN ORGANIZED HEALTH CARE EDUCATION/TRAINING PROGRAM

## 2022-01-03 PROCEDURE — U0002 COVID-19 LAB TEST NON-CDC: HCPCS | Mod: QW,S$GLB,, | Performed by: STUDENT IN AN ORGANIZED HEALTH CARE EDUCATION/TRAINING PROGRAM

## 2022-01-04 ENCOUNTER — PATIENT MESSAGE (OUTPATIENT)
Dept: FAMILY MEDICINE | Facility: CLINIC | Age: 62
End: 2022-01-04

## 2022-01-04 ENCOUNTER — CLINICAL SUPPORT (OUTPATIENT)
Dept: FAMILY MEDICINE | Facility: CLINIC | Age: 62
End: 2022-01-04
Payer: COMMERCIAL

## 2022-01-04 LAB
CTP QC/QA: YES
CTP QC/QA: YES
POC MOLECULAR INFLUENZA A AGN: NEGATIVE
POC MOLECULAR INFLUENZA B AGN: NEGATIVE
SARS-COV-2 RDRP RESP QL NAA+PROBE: POSITIVE

## 2022-01-04 NOTE — PROGRESS NOTES
Pt name and date of birth verified prior to obtaining specimen. Specimen obtained via nasopharyngeal swab for covid-19 and influenza A & B, pt tolerated well and advised to quarantine until results return.

## 2022-01-04 NOTE — TELEPHONE ENCOUNTER
She does not need a virtual.  She is my patient with known exposure.  I have signed orders.  Thanks. Dr. HIGGINBOTHAM

## 2022-01-12 ENCOUNTER — PATIENT MESSAGE (OUTPATIENT)
Dept: FAMILY MEDICINE | Facility: CLINIC | Age: 62
End: 2022-01-12
Payer: COMMERCIAL

## 2022-01-12 DIAGNOSIS — R11.0 NAUSEA: ICD-10-CM

## 2022-01-12 DIAGNOSIS — R21 RASH: Primary | ICD-10-CM

## 2022-01-13 RX ORDER — TRIAMCINOLONE ACETONIDE 5 MG/G
CREAM TOPICAL 2 TIMES DAILY
Qty: 15 G | Refills: 1 | Status: SHIPPED | OUTPATIENT
Start: 2022-01-13

## 2022-01-13 RX ORDER — ONDANSETRON 4 MG/1
8 TABLET, ORALLY DISINTEGRATING ORAL EVERY 6 HOURS PRN
Qty: 30 TABLET | Refills: 0 | Status: SHIPPED | OUTPATIENT
Start: 2022-01-13 | End: 2022-09-12 | Stop reason: SDUPTHER

## 2022-01-20 ENCOUNTER — OFFICE VISIT (OUTPATIENT)
Dept: CARDIOLOGY | Facility: CLINIC | Age: 62
End: 2022-01-20
Payer: COMMERCIAL

## 2022-01-20 VITALS
BODY MASS INDEX: 24.2 KG/M2 | OXYGEN SATURATION: 98 % | SYSTOLIC BLOOD PRESSURE: 118 MMHG | DIASTOLIC BLOOD PRESSURE: 70 MMHG | HEART RATE: 77 BPM | WEIGHT: 169 LBS | HEIGHT: 70 IN

## 2022-01-20 DIAGNOSIS — E05.90 HYPERTHYROIDISM: ICD-10-CM

## 2022-01-20 DIAGNOSIS — I48.0 PAF (PAROXYSMAL ATRIAL FIBRILLATION): ICD-10-CM

## 2022-01-20 DIAGNOSIS — U07.1 COVID-19: ICD-10-CM

## 2022-01-20 DIAGNOSIS — K21.9 GASTROESOPHAGEAL REFLUX DISEASE WITHOUT ESOPHAGITIS: ICD-10-CM

## 2022-01-20 DIAGNOSIS — I34.1 MITRAL VALVE PROLAPSE: ICD-10-CM

## 2022-01-20 DIAGNOSIS — I48.0 PAROXYSMAL ATRIAL FIBRILLATION: Primary | ICD-10-CM

## 2022-01-20 PROCEDURE — 93000 EKG 12-LEAD: ICD-10-PCS | Mod: S$GLB,,, | Performed by: INTERNAL MEDICINE

## 2022-01-20 PROCEDURE — 99214 PR OFFICE/OUTPT VISIT, EST, LEVL IV, 30-39 MIN: ICD-10-PCS | Mod: S$GLB,,, | Performed by: INTERNAL MEDICINE

## 2022-01-20 PROCEDURE — 99214 OFFICE O/P EST MOD 30 MIN: CPT | Mod: S$GLB,,, | Performed by: INTERNAL MEDICINE

## 2022-01-20 PROCEDURE — 1159F MED LIST DOCD IN RCRD: CPT | Mod: CPTII,S$GLB,, | Performed by: INTERNAL MEDICINE

## 2022-01-20 PROCEDURE — 93000 ELECTROCARDIOGRAM COMPLETE: CPT | Mod: S$GLB,,, | Performed by: INTERNAL MEDICINE

## 2022-01-20 PROCEDURE — 3074F SYST BP LT 130 MM HG: CPT | Mod: CPTII,S$GLB,, | Performed by: INTERNAL MEDICINE

## 2022-01-20 PROCEDURE — 3078F PR MOST RECENT DIASTOLIC BLOOD PRESSURE < 80 MM HG: ICD-10-PCS | Mod: CPTII,S$GLB,, | Performed by: INTERNAL MEDICINE

## 2022-01-20 PROCEDURE — 3074F PR MOST RECENT SYSTOLIC BLOOD PRESSURE < 130 MM HG: ICD-10-PCS | Mod: CPTII,S$GLB,, | Performed by: INTERNAL MEDICINE

## 2022-01-20 PROCEDURE — 3008F PR BODY MASS INDEX (BMI) DOCUMENTED: ICD-10-PCS | Mod: CPTII,S$GLB,, | Performed by: INTERNAL MEDICINE

## 2022-01-20 PROCEDURE — 1159F PR MEDICATION LIST DOCUMENTED IN MEDICAL RECORD: ICD-10-PCS | Mod: CPTII,S$GLB,, | Performed by: INTERNAL MEDICINE

## 2022-01-20 PROCEDURE — 3078F DIAST BP <80 MM HG: CPT | Mod: CPTII,S$GLB,, | Performed by: INTERNAL MEDICINE

## 2022-01-20 PROCEDURE — 3008F BODY MASS INDEX DOCD: CPT | Mod: CPTII,S$GLB,, | Performed by: INTERNAL MEDICINE

## 2022-01-20 RX ORDER — BENZONATATE 200 MG/1
200 CAPSULE ORAL 3 TIMES DAILY PRN
Qty: 90 CAPSULE | Refills: 3 | Status: SHIPPED | OUTPATIENT
Start: 2022-01-20 | End: 2022-01-30

## 2022-01-20 NOTE — PROGRESS NOTES
Subjective:    Patient ID:  Anthony Apple is a 61 y.o. female     Chief Complaint   Patient presents with    Follow-up     Follow up 4 months       HPI:  Ms   Anthony Apple is a 61 y.o. female is here for follow-up.  On January 3rd patient had COVID and did okay.  Patient stated that she had been having irregular heartbeat and she had an event monitor placed by her electrophysiologist where she was found have frequent PVCs and episodes of bigeminy and 1 run of 7 beat ventricular tachycardia.  Patient was seen by her in electrophysiologist.  Patient denies any chest pain or tightness or heaviness her breathing is good denies any shortness of breath or difficulty in breathing denies any dizziness or lightheadedness or loss of consciousness falls or head injury.    Review of patient's allergies indicates:   Allergen Reactions    Cefdinir Anaphylaxis, Rash and Shortness Of Breath    Nitrofurantoin macrocrystal Itching    Sulfamethoxazole-trimethoprim Itching, Other (See Comments) and Rash       Past Medical History:   Diagnosis Date    Atrial fibrillation     GERD (gastroesophageal reflux disease)     Hyperthyroidism     Osteoporosis     Panic attack     Seasonal allergies      History reviewed. No pertinent surgical history.  Social History     Tobacco Use    Smoking status: Never Smoker    Smokeless tobacco: Never Used   Substance Use Topics    Alcohol use: Yes     Alcohol/week: 2.0 standard drinks     Types: 1 Glasses of wine, 1 Cans of beer per week     Comment: on occasion     Drug use: Not Currently     Family History   Problem Relation Age of Onset    Stroke Mother     Arthritis Mother     Thyroid disease Mother     Colon cancer Father     Atrial fibrillation Father     Thyroid disease Father         Review of Systems:   Constitution: Negative for diaphoresis and fever.   HEENT: Negative for nosebleeds.    Cardiovascular: Negative for chest pain       No dyspnea on exertion       No leg  swelling        Recurrent palpitations  Respiratory: Negative for shortness of breath and wheezing.    Hematologic/Lymphatic: Negative for bleeding problem. Does not bruise/bleed easily.   Skin: Negative for color change and rash.   Musculoskeletal: Negative for falls and myalgias.   Gastrointestinal: Negative for hematemesis and hematochezia.   Genitourinary: Negative for hematuria.   Neurological: Negative for dizziness and light-headedness.   Psychiatric/Behavioral: Negative for altered mental status and memory loss.          Objective:        Vitals:    01/20/22 1459   BP: 118/70   Pulse: 77       Lab Results   Component Value Date    WBC 4.67 12/20/2021    HGB 13.0 12/20/2021    HCT 40.6 12/20/2021     12/20/2021    CHOL 164 12/20/2021    TRIG 50 12/20/2021    HDL 55 12/20/2021    ALT 20 12/20/2021    AST 25 12/20/2021     12/20/2021    K 4.4 12/20/2021     12/20/2021    CREATININE 0.8 12/20/2021    BUN 14 12/20/2021    CO2 25 12/20/2021    TSH 1.590 09/20/2021        ECHOCARDIOGRAM RESULTS  No results found for this or any previous visit.        CURRENT/PREVIOUS VISIT EKG  Results for orders placed or performed in visit on 09/20/21   IN OFFICE EKG 12-LEAD (to [facility])    Collection Time: 09/20/21 10:32 AM    Narrative    Test Reason : I48.0,    Vent. Rate : 074 BPM     Atrial Rate : 074 BPM     P-R Int : 152 ms          QRS Dur : 092 ms      QT Int : 382 ms       P-R-T Axes : 082 073 077 degrees     QTc Int : 424 ms    Normal sinus rhythm  Normal ECG  When compared with ECG of 22-FEB-2021 12:48,  Questionable change in The axis  T wave inversion no longer evident in Inferior leads  Confirmed by Kurt Koo MD ([id number]) on 10/1/2021 5:34:46 PM    Referred By:  JOHNATHON           Confirmed By:Kurt Koo MD     No valid procedures specified.   No results found for this or any previous visit.      Physical Exam:  CONSTITUTIONAL: No fever, no chills  HEENT: Normocephalic, atraumatic,pupils reactive to  light                 NECK:  No JVD no carotid bruit  CVS: S1S2+, RRR, soft systolic murmurs,   LUNGS: Clear  ABDOMEN: Soft, NT, BS+  EXTREMITIES: No cyanosis, edema  : No roman catheter  NEURO: AAO X 3  PSY: Normal affect      Medication List with Changes/Refills   Current Medications    ALENDRONATE (FOSAMAX) 70 MG TABLET    Fosamax 70 mg tablet    ALPRAZOLAM (XANAX) 0.25 MG TABLET    Take 1 tablet (0.25 mg total) by mouth daily as needed for Anxiety.    AMMONIUM LACTATE (LAC-HYDRIN) 12 % LOTION    Apply topically 2 (two) times daily as needed for Dry Skin.    ASPIRIN (ECOTRIN) 81 MG EC TABLET    aspirin 81 mg tablet,delayed release   Take 1 tablet every day by oral route.    AZELASTINE (ASTELIN) 137 MCG (0.1 %) NASAL SPRAY    2 sprays (274 mcg total) by Nasal route 2 (two) times daily.    BEPREVE 1.5 % DROP    PLACE 1 DROP INTO OU BID    CALCIUM CARBONATE-VITAMIN D3 (CALTRATE 600 PLUS D) 600 MG (1,500 MG)-800 UNIT CHEW    Caltrate 600 plus D  600 mg (1,500 mg)-800 unit chewable tablet   Take 1 tablet twice a day by oral route.    DAILY MULTI-VITAMIN ORAL    Daily Multi-Vitamin   one tablet daily    DIPHENHYDRAMINE (BENADRYL) 25 MG CAPSULE    Take 25 mg by mouth every 6 (six) hours as needed for Itching.    FLECAINIDE (TAMBOCOR) 100 MG TAB    Take 100 mg by mouth as needed.    FLECAINIDE (TAMBOCOR) 100 MG TAB    Take 1 tablet (100 mg total) by mouth once daily.    METOPROLOL SUCCINATE (TOPROL-XL) 25 MG 24 HR TABLET    Take 1 tablet (25 mg total) by mouth once daily. Pt takes 1/2    MONTELUKAST (SINGULAIR) 10 MG TABLET    TAKE 1 TABLET(10 MG) BY MOUTH EVERY DAY    OMEPRAZOLE (PRILOSEC) 40 MG CAPSULE    Take 1 capsule (40 mg total) by mouth daily as needed (reflux).    ONDANSETRON (ZOFRAN-ODT) 4 MG TBDL    Take 2 tablets (8 mg total) by mouth every 6 (six) hours as needed (nausea).    TRIAMCINOLONE ACETONIDE 0.5% (KENALOG) 0.5 % CREA    Apply topically 2 (two) times daily.             Assessment:       1.  Paroxysmal atrial fibrillation    2. PAF (paroxysmal atrial fibrillation)    3. Mitral valve prolapse    4. Hyperthyroidism    5. Gastroesophageal reflux disease without esophagitis    6. COVID-19         Plan:   1. Patient has paroxysmal atrial fibrillation and is currently being treated by her electrophysiologist Dr. Aniket Drummond and takes Tambocor 100 mg as needed additional does and she is usually on Tambocor 100 mg p.o. q.day.  2. She is currently on aspirin 81 mg p.o. q.day.  3. Patient had an event monitor done by her EP where she has significant amount of PVCs and PACs and 35 episodes of bigeminy and a 7 beat run of an SVT and she was seen by her electrophysiologist after the event monitor.   4. Discussed with patient in regards with flecainide and frequent PVCs.  And patient discussed with her electrophysiologist.  5. Discussed with patient to proceed and get her flu shot.  6.  Will also schedule her for exercise stress Cardiolite study to evaluate for ischemia and a 2D echocardiogram for LV function ejection fraction wall motion abnormality  7. I will see her back in the office after the testing has been completed.  8. Reviewed her EKG independently patient is in normal sinus rhythm heart rate of 79 beats per minute normal intervals poor R-wave progression in the anterior precordial leads cannot rule out anterior infarct age undetermined.  Compared to previous EKG done on 09/20/2021 no significant change.    Problem List Items Addressed This Visit        Cardiac/Vascular    PAF (paroxysmal atrial fibrillation)    Mitral valve prolapse    Paroxysmal atrial fibrillation - Primary    Relevant Orders    IN OFFICE EKG 12-LEAD (to [facility])       Endocrine    Hyperthyroidism       GI    Gastroesophageal reflux disease without esophagitis       Other    COVID-19          No follow-ups on file.

## 2022-01-26 ENCOUNTER — OFFICE VISIT (OUTPATIENT)
Dept: SPORTS MEDICINE | Facility: CLINIC | Age: 62
End: 2022-01-26
Payer: COMMERCIAL

## 2022-01-26 ENCOUNTER — HOSPITAL ENCOUNTER (OUTPATIENT)
Dept: RADIOLOGY | Facility: CLINIC | Age: 62
Discharge: HOME | End: 2022-01-26
Attending: FAMILY MEDICINE
Payer: COMMERCIAL

## 2022-01-26 VITALS
WEIGHT: 168 LBS | BODY MASS INDEX: 24.11 KG/M2 | DIASTOLIC BLOOD PRESSURE: 72 MMHG | SYSTOLIC BLOOD PRESSURE: 118 MMHG | HEART RATE: 84 BPM | OXYGEN SATURATION: 97 %

## 2022-01-26 DIAGNOSIS — M25.561 CHRONIC PAIN OF RIGHT KNEE: ICD-10-CM

## 2022-01-26 DIAGNOSIS — G89.29 CHRONIC PAIN OF RIGHT KNEE: Primary | ICD-10-CM

## 2022-01-26 DIAGNOSIS — M25.561 CHRONIC PAIN OF RIGHT KNEE: Primary | ICD-10-CM

## 2022-01-26 DIAGNOSIS — G89.29 CHRONIC PAIN OF RIGHT KNEE: ICD-10-CM

## 2022-01-26 DIAGNOSIS — M23.8X1 CHONDRAL DEFECT OF RIGHT PATELLA: Primary | ICD-10-CM

## 2022-01-26 PROCEDURE — 3008F BODY MASS INDEX DOCD: CPT | Mod: S$GLB,,, | Performed by: FAMILY MEDICINE

## 2022-01-26 PROCEDURE — 3078F DIAST BP <80 MM HG: CPT | Mod: S$GLB,,, | Performed by: FAMILY MEDICINE

## 2022-01-26 PROCEDURE — 99999 PR PBB SHADOW E&M-EST. PATIENT-LVL III: CPT | Mod: PBBFAC,,, | Performed by: FAMILY MEDICINE

## 2022-01-26 PROCEDURE — 73562 X-RAY EXAM OF KNEE 3: CPT | Mod: TC,LT,, | Performed by: FAMILY MEDICINE

## 2022-01-26 PROCEDURE — 1160F PR REVIEW ALL MEDS BY PRESCRIBER/CLIN PHARMACIST DOCUMENTED: ICD-10-PCS | Mod: S$GLB,,, | Performed by: FAMILY MEDICINE

## 2022-01-26 PROCEDURE — 3074F SYST BP LT 130 MM HG: CPT | Mod: S$GLB,,, | Performed by: FAMILY MEDICINE

## 2022-01-26 PROCEDURE — 1160F RVW MEDS BY RX/DR IN RCRD: CPT | Mod: S$GLB,,, | Performed by: FAMILY MEDICINE

## 2022-01-26 PROCEDURE — 1159F PR MEDICATION LIST DOCUMENTED IN MEDICAL RECORD: ICD-10-PCS | Mod: S$GLB,,, | Performed by: FAMILY MEDICINE

## 2022-01-26 PROCEDURE — 73562 XR KNEE ORTHO RIGHT WITH FLEXION: ICD-10-PCS | Mod: TC,LT,, | Performed by: FAMILY MEDICINE

## 2022-01-26 PROCEDURE — 99999 PR PBB SHADOW E&M-EST. PATIENT-LVL III: ICD-10-PCS | Mod: PBBFAC,,, | Performed by: FAMILY MEDICINE

## 2022-01-26 PROCEDURE — 99212 PR OFFICE/OUTPT VISIT, EST, LEVL II, 10-19 MIN: ICD-10-PCS | Mod: S$GLB,,, | Performed by: FAMILY MEDICINE

## 2022-01-26 PROCEDURE — 1159F MED LIST DOCD IN RCRD: CPT | Mod: S$GLB,,, | Performed by: FAMILY MEDICINE

## 2022-01-26 PROCEDURE — 73562 X-RAY EXAM OF KNEE 3: CPT | Mod: 26,LT,, | Performed by: RADIOLOGY

## 2022-01-26 PROCEDURE — 3074F PR MOST RECENT SYSTOLIC BLOOD PRESSURE < 130 MM HG: ICD-10-PCS | Mod: S$GLB,,, | Performed by: FAMILY MEDICINE

## 2022-01-26 PROCEDURE — 3078F PR MOST RECENT DIASTOLIC BLOOD PRESSURE < 80 MM HG: ICD-10-PCS | Mod: S$GLB,,, | Performed by: FAMILY MEDICINE

## 2022-01-26 PROCEDURE — 73564 XR KNEE ORTHO RIGHT WITH FLEXION: ICD-10-PCS | Mod: 26,RT,, | Performed by: RADIOLOGY

## 2022-01-26 PROCEDURE — 73564 X-RAY EXAM KNEE 4 OR MORE: CPT | Mod: TC,RT,, | Performed by: FAMILY MEDICINE

## 2022-01-26 PROCEDURE — 3008F PR BODY MASS INDEX (BMI) DOCUMENTED: ICD-10-PCS | Mod: S$GLB,,, | Performed by: FAMILY MEDICINE

## 2022-01-26 PROCEDURE — 73562 XR KNEE ORTHO RIGHT WITH FLEXION: ICD-10-PCS | Mod: 26,LT,, | Performed by: RADIOLOGY

## 2022-01-26 PROCEDURE — 99212 OFFICE O/P EST SF 10 MIN: CPT | Mod: S$GLB,,, | Performed by: FAMILY MEDICINE

## 2022-01-26 PROCEDURE — 73564 X-RAY EXAM KNEE 4 OR MORE: CPT | Mod: 26,RT,, | Performed by: RADIOLOGY

## 2022-01-26 PROCEDURE — 73564 XR KNEE ORTHO RIGHT WITH FLEXION: ICD-10-PCS | Mod: TC,RT,, | Performed by: FAMILY MEDICINE

## 2022-01-26 NOTE — PROGRESS NOTES
Subjective:          Chief Complaint: Anthony Apple is a 61 y.o. female who had concerns including Pain and Numbness of the Right Knee.    Patient here today with concerns of right knee pain.  States that is been present off and on since an injury 20 years ago when she fell directly on her knee slipping on a concrete floor.  States that 2-3 months ago she re-injured it.  Was having initial stiffness and swelling and noted an area of numbness in the lateral aspect of her knee.  All of her symptoms have improved other than still having some stiffness.  Currently not requiring any medication for her knee.  It is functioning fully she just wanted it evaluated today.      Review of Systems   Constitutional: Negative for chills and decreased appetite.   HENT: Negative for congestion and sore throat.    Eyes: Negative for blurred vision.   Cardiovascular: Negative for chest pain, dyspnea on exertion and palpitations.   Respiratory: Negative for cough and shortness of breath.    Skin: Negative for rash.   Neurological: Negative for difficulty with concentration, disturbances in coordination and headaches.   Psychiatric/Behavioral: Negative for altered mental status, depression, hallucinations, memory loss and suicidal ideas.                   Objective:        General: Anthony is well-developed, well-nourished, appears stated age, in no acute distress, alert and oriented to time, place and person.     General    Nursing note and vitals reviewed.  Constitutional: She is oriented to person, place, and time. She appears well-developed and well-nourished.   HENT:   Nose: Nose normal.   Eyes: EOM are normal. Pupils are equal, round, and reactive to light.   Neck: Neck supple.   Cardiovascular: Normal rate.    Pulmonary/Chest: Effort normal.   Abdominal: Soft.   Neurological: She is alert and oriented to person, place, and time. She has normal reflexes.   Psychiatric: She has a normal mood and affect. Her behavior is normal.  Judgment and thought content normal.           Right Knee Exam     Inspection   Effusion: absent    Tenderness   The patient is experiencing no tenderness.     Crepitus   The patient has crepitus of the patella.    Range of Motion   Extension: 50   Flexion: 150     Tests   Meniscus   Alex:  Medial - negative Lateral - negative  Ligament Examination Lachman: normal (-1 to 2mm) PCL-Posterior Drawer: normal (0 to 2mm)     MCL - Valgus: normal (0 to 2mm)  LCL - Varus: normal  Patella   Patellar apprehension: negative  Passive Patellar Tilt: neutral  Patellar Tracking: normal    Other   Sensation: normal    Left Knee Exam   Left knee exam is normal.    Range of Motion   Extension: normal   Flexion: normal     Tests   Stability Lachman: normal (-1 to 2mm) PCL-Posterior Drawer: normal (0 to 2mm)  MCL - Valgus: normal (0 to 2mm)  LCL - Varus: normal (0 to 2mm)    Muscle Strength   Right Lower Extremity   Quadriceps:  5/5   Hamstrin/5     Vascular Exam     Right Pulses  Dorsalis Pedis:      2+              X-ray images ordered obtained interpreted by me.  They show only slight degenerative changes of the lateral components of both knees.  There is no effusion and no acute fractures present.        Assessment:       Encounter Diagnoses   Name Primary?    Chronic pain of right knee     Chondral defect of right patella Yes          Plan:         Exam and history suspicious for a chondral defect of the patella.  She is currently not experiencing any significant symptoms.  Will observe only for now and she may return to clinic if condition ever becomes worse.                  Patient questionnaires may have been collected.

## 2022-01-26 NOTE — PATIENT INSTRUCTIONS
Patient Education       Chondromalacia Patella Exercises   About this topic   Your kneecap is also called the patella. It is shaped like a triangle and protects the front of the knee. The strong muscles on the front of the thigh, called the quadriceps, attach to the kneecap. The kneecap helps these muscles straighten the knee. The bottom surface of the kneecap has a layer of cartilage on it. Cartilage is a smooth tissue. It helps the kneecap glide over a groove in the thigh bone when you straighten your knee. When the cartilage breaks down on the back of the kneecap, it is called chondromalacia patella. You may have pain in the front of the knee and grinding when the knee bends. Exercise may help this problem.  General   Before starting with a program, ask your doctor if you are healthy enough to do these exercises. Your doctor may have you work with a  or physical therapist to make a safe exercise program to meet your needs.  Stretching Exercises   Stretching exercises keep your muscles flexible. They also stop them from getting tight. Start by doing each of these stretches 2 to 3 times. In order for your body to make changes, you will need to hold these stretches for 20 to 30 seconds. Try to do the stretches 2 to 3 times each day. Do all exercises slowly.  · Hamstring stretches seated ? Sit up straight on the edge of a chair. Make sure you keep your back straight. Straighten your knee on your left leg. Keep your heel on the floor. Bend forward at the waist towards your foot while keeping your upper back straight. Bend forward until you feel a stretch in the back of your thigh. Repeat on the other leg.  · Thigh stretches standing ? Stand close to a wall or chair for balance. Bend one knee up and grab your foot behind you with the hand on the same side. If you have trouble grabbing your foot, you can grab your pants near the ankle. Pull your foot closer to your back while bringing the hip backwards. You  should feel a stretch at the front of your thigh, hip, and knee. You may find this easier if you rest the top of the foot of the bent leg on the arm of a couch. If you have trouble with balance, you can do this stretch by lying on your side and bending the top leg back.  · Iliotibial band stretches:  ? To stretch the left IT band: Stand up with your right leg crossed over the left one. Try to point the toes of the front leg outwards. This is a very awkward position. Lean your upper body towards the right while bending your right knee. You should feel a stretch near the left hip. Hold and repeat.  ? To stretch the right IT band: Stand up with your left leg crossed over the right one. Try to point the toes of the front leg outwards. This is a very awkward position. Lean your upper body towards the left while bending your left knee. You should feel a stretch near the right hip. Hold and repeat.  Strengthening Exercises   Strengthening exercises keep your muscles firm and strong. Start by repeating each exercise 2 to 3 times. Work up to doing each exercise 10 times. Hold each exercise 3-5 seconds. Try to do the exercises 2 to 3 times each day. Do all exercises slowly.  · Straight leg raises lying down ? Lie on your back with one leg straight. Bend your other knee so the foot is flat on the bed. Push the knee of your straight leg into the bed in order to tighten your muscle. Keeping your leg straight, lift the leg up to the level of your other knee. Lower it back down. Repeat with the other leg. You may need help with this exercise until you are strong enough to do it on your own. If so, have a helper give support under your ankle with one hand.  · Lower leg lifts with towel ? Lie on your back with your knee slightly bent and foot flat on the bed. Roll up a large towel and put it under your sore knee. Tighten your front thigh muscles and lift the lower leg off the bed until it as straight as possible.  · Wall squats ?  Stand near a wall with an exercise ball between your back and the wall. Bend your knees. Return to standing. You can make this exercise harder by bending deeper or squatting with only one leg. If you do not have a ball, you can do this with your back up against the wall.               What will the results be?   · Less pain and swelling  · Better range of motion  · More strength  · Less grinding or cracking noises  · Easier to walk and do other activities  Helpful tips   · Stay active and work out to keep your muscles strong and flexible.  · Keep a healthy weight so there is not extra stress on your joints. Eat a healthy diet to keep your muscles healthy.  · Be sure you do not hold your breath when exercising. This can raise your blood pressure. If you tend to hold your breath, try counting out loud when exercising. If any exercise bothers you, stop right away.  · Always warm up before stretching. Heated muscles stretch much easier than cool muscles. Stretching cool muscles can lead to injury.  · Try walking or cycling at an easy pace for a few minutes to warm up your muscles. Do this again after exercising.  · Never bounce when doing stretches.  · After exercising, it is a good idea to use ice. Place an ice pack or a bag of frozen peas wrapped in a towel over the painful part. Never put ice right on the skin. Do not leave the ice on more than 10 to 15 minutes at a time. Ice after activity may help decrease pain and swelling. Never ice before stretching.  · If you have this problem, you may not want to do things like run, lunge, squat, kneel, or jump. You may also want to limit how often you go up and down the steps.  · Doing exercises before a meal may be a good way to get into a routine.  · Exercise may be slightly uncomfortable, but you should not have sharp pains. If you do get sharp pains, stop what you are doing. If the sharp pains continue, call your doctor.  Where can I learn more?   [employer]  [employer]  https://[email or web address].org/condition/patellofemoral-pain-[email or web address]   Last Reviewed Date   2021-07-26  Consumer Information Use and Disclaimer   This information is not specific medical advice and does not replace information you receive from your health care provider. This is only a brief summary of general information. It does NOT include all information about conditions, illnesses, injuries, tests, procedures, treatments, therapies, discharge instructions or life-style choices that may apply to you. You must talk with your health care provider for complete information about your health and treatment options. This information should not be used to decide whether or not to accept your health care providers advice, instructions or recommendations. Only your health care provider has the knowledge and training to provide advice that is right for you.  Copyright   Copyright © 2021 [employer] and its affiliates and/or licensors. All rights reserved.

## 2022-02-27 ENCOUNTER — OFFICE VISIT (OUTPATIENT)
Dept: URGENT CARE | Facility: CLINIC | Age: 62
End: 2022-02-27
Payer: COMMERCIAL

## 2022-02-27 VITALS
DIASTOLIC BLOOD PRESSURE: 75 MMHG | HEART RATE: 74 BPM | RESPIRATION RATE: 16 BRPM | SYSTOLIC BLOOD PRESSURE: 117 MMHG | TEMPERATURE: 98 F | OXYGEN SATURATION: 100 % | BODY MASS INDEX: 24.77 KG/M2 | HEIGHT: 70 IN | WEIGHT: 173 LBS

## 2022-02-27 DIAGNOSIS — S90.812A ABRASION OF SKIN OF LEFT FOOT: ICD-10-CM

## 2022-02-27 DIAGNOSIS — Z23 NEED FOR TDAP VACCINATION: Primary | ICD-10-CM

## 2022-02-27 PROCEDURE — 1160F PR REVIEW ALL MEDS BY PRESCRIBER/CLIN PHARMACIST DOCUMENTED: ICD-10-PCS | Mod: S$GLB,,, | Performed by: NURSE PRACTITIONER

## 2022-02-27 PROCEDURE — 1159F PR MEDICATION LIST DOCUMENTED IN MEDICAL RECORD: ICD-10-PCS | Mod: S$GLB,,, | Performed by: NURSE PRACTITIONER

## 2022-02-27 PROCEDURE — 99213 PR OFFICE/OUTPT VISIT, EST, LEVL III, 20-29 MIN: ICD-10-PCS | Mod: 25,S$GLB,, | Performed by: NURSE PRACTITIONER

## 2022-02-27 PROCEDURE — 3078F DIAST BP <80 MM HG: CPT | Mod: S$GLB,,, | Performed by: NURSE PRACTITIONER

## 2022-02-27 PROCEDURE — 90471 IMMUNIZATION ADMIN: CPT | Mod: S$GLB,,, | Performed by: NURSE PRACTITIONER

## 2022-02-27 PROCEDURE — 3074F SYST BP LT 130 MM HG: CPT | Mod: S$GLB,,, | Performed by: NURSE PRACTITIONER

## 2022-02-27 PROCEDURE — 3008F BODY MASS INDEX DOCD: CPT | Mod: S$GLB,,, | Performed by: NURSE PRACTITIONER

## 2022-02-27 PROCEDURE — 1160F RVW MEDS BY RX/DR IN RCRD: CPT | Mod: S$GLB,,, | Performed by: NURSE PRACTITIONER

## 2022-02-27 PROCEDURE — 3008F PR BODY MASS INDEX (BMI) DOCUMENTED: ICD-10-PCS | Mod: S$GLB,,, | Performed by: NURSE PRACTITIONER

## 2022-02-27 PROCEDURE — 90715 TDAP VACCINE 7 YRS/> IM: CPT | Mod: S$GLB,,, | Performed by: NURSE PRACTITIONER

## 2022-02-27 PROCEDURE — 90471 TDAP VACCINE GREATER THAN OR EQUAL TO 7YO IM: ICD-10-PCS | Mod: S$GLB,,, | Performed by: NURSE PRACTITIONER

## 2022-02-27 PROCEDURE — 3078F PR MOST RECENT DIASTOLIC BLOOD PRESSURE < 80 MM HG: ICD-10-PCS | Mod: S$GLB,,, | Performed by: NURSE PRACTITIONER

## 2022-02-27 PROCEDURE — 1159F MED LIST DOCD IN RCRD: CPT | Mod: S$GLB,,, | Performed by: NURSE PRACTITIONER

## 2022-02-27 PROCEDURE — 99213 OFFICE O/P EST LOW 20 MIN: CPT | Mod: 25,S$GLB,, | Performed by: NURSE PRACTITIONER

## 2022-02-27 PROCEDURE — 3074F PR MOST RECENT SYSTOLIC BLOOD PRESSURE < 130 MM HG: ICD-10-PCS | Mod: S$GLB,,, | Performed by: NURSE PRACTITIONER

## 2022-02-27 PROCEDURE — 90715 TDAP VACCINE GREATER THAN OR EQUAL TO 7YO IM: ICD-10-PCS | Mod: S$GLB,,, | Performed by: NURSE PRACTITIONER

## 2022-02-27 NOTE — PROGRESS NOTES
"Subjective:       Patient ID: Anthony Apple is a 61 y.o. female.    Vitals:  height is 5' 10" (1.778 m) and weight is 78.5 kg (173 lb). Her oral temperature is 97.9 °F (36.6 °C). Her blood pressure is 117/75 and her pulse is 74. Her respiration is 16 and oxygen saturation is 100%.     Chief Complaint: Abrasion  -  60 y/o female presents for Td vaccine. Reports scratching her left lateral side of her foot 3 days ago on a scott dog create and last Td 2010. Denies drainage, fever or pain.   -  Patient states she scraped her self on a scott dog crate about 3 days ago. States she just needs a tetanus shot.       Constitution: Negative.   HENT: Negative.    Neck: neck negative.   Cardiovascular: Negative.    Eyes: Negative.    Respiratory: Negative.    Gastrointestinal: Negative.    Endocrine: negative.   Genitourinary: Negative.    Musculoskeletal: Negative.    Skin: Positive for abrasion.   Allergic/Immunologic: Negative.    Neurological: Negative.    Hematologic/Lymphatic: Negative.        Objective:      Physical Exam   Constitutional: She is oriented to person, place, and time. She appears well-developed. She does not appear ill. normal  Eyes: Conjunctivae are normal.   Pulmonary/Chest: Effort normal.   Abdominal: Normal appearance.   Musculoskeletal: Normal range of motion.         General: Normal range of motion.   Neurological: She is alert, oriented to person, place, and time and at baseline.   Skin: Skin is warm and dry.         Comments: 1 cm faint scratch left lower lateral foot. No infection noted.    Psychiatric: Her behavior is normal. Mood, judgment and thought content normal.   Nursing note and vitals reviewed.        Assessment:       1. Need for Tdap vaccination    2. Abrasion of skin of left foot          Plan:     1- Tdap today  2- RTC PRN    Need for Tdap vaccination  -     (In Office Administered) Tdap Vaccine    Abrasion of skin of left foot  -     (In Office Administered) Tdap Vaccine       "

## 2022-03-04 ENCOUNTER — TELEPHONE (OUTPATIENT)
Dept: CARDIOLOGY | Facility: CLINIC | Age: 62
End: 2022-03-04
Payer: COMMERCIAL

## 2022-03-04 DIAGNOSIS — I48.0 PAROXYSMAL ATRIAL FIBRILLATION: Primary | ICD-10-CM

## 2022-03-04 DIAGNOSIS — I34.1 MITRAL VALVE PROLAPSE: ICD-10-CM

## 2022-03-04 DIAGNOSIS — I48.0 PAF (PAROXYSMAL ATRIAL FIBRILLATION): ICD-10-CM

## 2022-03-04 NOTE — TELEPHONE ENCOUNTER
----- Message from Suhail Gaming sent at 3/4/2022  4:16 PM CST -----  Contact: pt  Making sure stress test is not nuclear stress test and is the one on tread mill please call back pt to confirm     [phone]

## 2022-03-10 NOTE — TELEPHONE ENCOUNTER
Felicia called the patient today and rescheduled her for a regular stress for tomorrow.    Xray Knee 4 Views, Bilateral

## 2022-03-21 ENCOUNTER — OFFICE VISIT (OUTPATIENT)
Dept: CARDIOLOGY | Facility: CLINIC | Age: 62
End: 2022-03-21
Payer: COMMERCIAL

## 2022-03-21 VITALS
WEIGHT: 173 LBS | BODY MASS INDEX: 24.77 KG/M2 | SYSTOLIC BLOOD PRESSURE: 128 MMHG | DIASTOLIC BLOOD PRESSURE: 84 MMHG | OXYGEN SATURATION: 98 % | HEIGHT: 70 IN | HEART RATE: 61 BPM | RESPIRATION RATE: 16 BRPM

## 2022-03-21 DIAGNOSIS — I48.0 PAROXYSMAL ATRIAL FIBRILLATION: Primary | ICD-10-CM

## 2022-03-21 DIAGNOSIS — I34.1 MVP (MITRAL VALVE PROLAPSE): ICD-10-CM

## 2022-03-21 DIAGNOSIS — R00.8 VENTRICULAR BIGEMINY SEEN ON CARDIAC MONITOR: ICD-10-CM

## 2022-03-21 DIAGNOSIS — K21.9 GASTROESOPHAGEAL REFLUX DISEASE WITHOUT ESOPHAGITIS: ICD-10-CM

## 2022-03-21 DIAGNOSIS — E05.90 HYPERTHYROIDISM: ICD-10-CM

## 2022-03-21 DIAGNOSIS — U07.1 COVID-19: ICD-10-CM

## 2022-03-21 DIAGNOSIS — F41.0 PANIC DISORDER: ICD-10-CM

## 2022-03-21 DIAGNOSIS — I34.1 MITRAL VALVE PROLAPSE: ICD-10-CM

## 2022-03-21 PROCEDURE — 3079F PR MOST RECENT DIASTOLIC BLOOD PRESSURE 80-89 MM HG: ICD-10-PCS | Mod: CPTII,S$GLB,, | Performed by: INTERNAL MEDICINE

## 2022-03-21 PROCEDURE — 1160F PR REVIEW ALL MEDS BY PRESCRIBER/CLIN PHARMACIST DOCUMENTED: ICD-10-PCS | Mod: CPTII,S$GLB,, | Performed by: INTERNAL MEDICINE

## 2022-03-21 PROCEDURE — 3079F DIAST BP 80-89 MM HG: CPT | Mod: CPTII,S$GLB,, | Performed by: INTERNAL MEDICINE

## 2022-03-21 PROCEDURE — 1160F RVW MEDS BY RX/DR IN RCRD: CPT | Mod: CPTII,S$GLB,, | Performed by: INTERNAL MEDICINE

## 2022-03-21 PROCEDURE — 1159F PR MEDICATION LIST DOCUMENTED IN MEDICAL RECORD: ICD-10-PCS | Mod: CPTII,S$GLB,, | Performed by: INTERNAL MEDICINE

## 2022-03-21 PROCEDURE — 3074F PR MOST RECENT SYSTOLIC BLOOD PRESSURE < 130 MM HG: ICD-10-PCS | Mod: CPTII,S$GLB,, | Performed by: INTERNAL MEDICINE

## 2022-03-21 PROCEDURE — 3008F PR BODY MASS INDEX (BMI) DOCUMENTED: ICD-10-PCS | Mod: CPTII,S$GLB,, | Performed by: INTERNAL MEDICINE

## 2022-03-21 PROCEDURE — 1159F MED LIST DOCD IN RCRD: CPT | Mod: CPTII,S$GLB,, | Performed by: INTERNAL MEDICINE

## 2022-03-21 PROCEDURE — 99214 PR OFFICE/OUTPT VISIT, EST, LEVL IV, 30-39 MIN: ICD-10-PCS | Mod: S$GLB,,, | Performed by: INTERNAL MEDICINE

## 2022-03-21 PROCEDURE — 3074F SYST BP LT 130 MM HG: CPT | Mod: CPTII,S$GLB,, | Performed by: INTERNAL MEDICINE

## 2022-03-21 PROCEDURE — 3008F BODY MASS INDEX DOCD: CPT | Mod: CPTII,S$GLB,, | Performed by: INTERNAL MEDICINE

## 2022-03-21 PROCEDURE — 99214 OFFICE O/P EST MOD 30 MIN: CPT | Mod: S$GLB,,, | Performed by: INTERNAL MEDICINE

## 2022-04-20 ENCOUNTER — PATIENT MESSAGE (OUTPATIENT)
Dept: FAMILY MEDICINE | Facility: CLINIC | Age: 62
End: 2022-04-20
Payer: COMMERCIAL

## 2022-04-20 DIAGNOSIS — J30.2 SEASONAL ALLERGIES: ICD-10-CM

## 2022-04-20 RX ORDER — MONTELUKAST SODIUM 10 MG/1
10 TABLET ORAL DAILY
Qty: 90 TABLET | Refills: 3 | Status: SHIPPED | OUTPATIENT
Start: 2022-04-20 | End: 2022-06-13 | Stop reason: SDUPTHER

## 2022-06-02 ENCOUNTER — OFFICE VISIT (OUTPATIENT)
Dept: URGENT CARE | Facility: CLINIC | Age: 62
End: 2022-06-02
Payer: COMMERCIAL

## 2022-06-02 VITALS
SYSTOLIC BLOOD PRESSURE: 127 MMHG | RESPIRATION RATE: 16 BRPM | HEART RATE: 68 BPM | OXYGEN SATURATION: 98 % | DIASTOLIC BLOOD PRESSURE: 74 MMHG | TEMPERATURE: 98 F

## 2022-06-02 DIAGNOSIS — J02.9 SORE THROAT: ICD-10-CM

## 2022-06-02 DIAGNOSIS — R35.0 URINARY FREQUENCY: Primary | ICD-10-CM

## 2022-06-02 DIAGNOSIS — H65.192 ACUTE MIDDLE EAR EFFUSION, LEFT: ICD-10-CM

## 2022-06-02 DIAGNOSIS — H66.92 ACUTE OTITIS MEDIA, LEFT: ICD-10-CM

## 2022-06-02 LAB
BILIRUB UR QL STRIP: NEGATIVE
CTP QC/QA: YES
GLUCOSE UR QL STRIP: NEGATIVE
KETONES UR QL STRIP: NEGATIVE
LEUKOCYTE ESTERASE UR QL STRIP: NEGATIVE
PH, POC UA: 7.5
POC BLOOD, URINE: POSITIVE
POC NITRATES, URINE: NEGATIVE
PROT UR QL STRIP: NEGATIVE
S PYO RRNA THROAT QL PROBE: NEGATIVE
SP GR UR STRIP: 1.01 (ref 1–1.03)
UROBILINOGEN UR STRIP-ACNC: ABNORMAL (ref 0.1–1.1)

## 2022-06-02 PROCEDURE — 3074F SYST BP LT 130 MM HG: CPT | Mod: S$GLB,,, | Performed by: STUDENT IN AN ORGANIZED HEALTH CARE EDUCATION/TRAINING PROGRAM

## 2022-06-02 PROCEDURE — 1159F PR MEDICATION LIST DOCUMENTED IN MEDICAL RECORD: ICD-10-PCS | Mod: S$GLB,,, | Performed by: STUDENT IN AN ORGANIZED HEALTH CARE EDUCATION/TRAINING PROGRAM

## 2022-06-02 PROCEDURE — 1160F PR REVIEW ALL MEDS BY PRESCRIBER/CLIN PHARMACIST DOCUMENTED: ICD-10-PCS | Mod: S$GLB,,, | Performed by: STUDENT IN AN ORGANIZED HEALTH CARE EDUCATION/TRAINING PROGRAM

## 2022-06-02 PROCEDURE — 3078F PR MOST RECENT DIASTOLIC BLOOD PRESSURE < 80 MM HG: ICD-10-PCS | Mod: S$GLB,,, | Performed by: STUDENT IN AN ORGANIZED HEALTH CARE EDUCATION/TRAINING PROGRAM

## 2022-06-02 PROCEDURE — 99214 PR OFFICE/OUTPT VISIT, EST, LEVL IV, 30-39 MIN: ICD-10-PCS | Mod: S$GLB,,, | Performed by: STUDENT IN AN ORGANIZED HEALTH CARE EDUCATION/TRAINING PROGRAM

## 2022-06-02 PROCEDURE — 1159F MED LIST DOCD IN RCRD: CPT | Mod: S$GLB,,, | Performed by: STUDENT IN AN ORGANIZED HEALTH CARE EDUCATION/TRAINING PROGRAM

## 2022-06-02 PROCEDURE — 87880 STREP A ASSAY W/OPTIC: CPT | Mod: QW,,, | Performed by: STUDENT IN AN ORGANIZED HEALTH CARE EDUCATION/TRAINING PROGRAM

## 2022-06-02 PROCEDURE — 81003 URINALYSIS AUTO W/O SCOPE: CPT | Mod: QW,S$GLB,, | Performed by: STUDENT IN AN ORGANIZED HEALTH CARE EDUCATION/TRAINING PROGRAM

## 2022-06-02 PROCEDURE — 81003 POCT URINALYSIS, DIPSTICK, AUTOMATED, W/O SCOPE: ICD-10-PCS | Mod: QW,S$GLB,, | Performed by: STUDENT IN AN ORGANIZED HEALTH CARE EDUCATION/TRAINING PROGRAM

## 2022-06-02 PROCEDURE — 99214 OFFICE O/P EST MOD 30 MIN: CPT | Mod: S$GLB,,, | Performed by: STUDENT IN AN ORGANIZED HEALTH CARE EDUCATION/TRAINING PROGRAM

## 2022-06-02 PROCEDURE — 1160F RVW MEDS BY RX/DR IN RCRD: CPT | Mod: S$GLB,,, | Performed by: STUDENT IN AN ORGANIZED HEALTH CARE EDUCATION/TRAINING PROGRAM

## 2022-06-02 PROCEDURE — 3074F PR MOST RECENT SYSTOLIC BLOOD PRESSURE < 130 MM HG: ICD-10-PCS | Mod: S$GLB,,, | Performed by: STUDENT IN AN ORGANIZED HEALTH CARE EDUCATION/TRAINING PROGRAM

## 2022-06-02 PROCEDURE — 87880 POCT RAPID STREP A: ICD-10-PCS | Mod: QW,,, | Performed by: STUDENT IN AN ORGANIZED HEALTH CARE EDUCATION/TRAINING PROGRAM

## 2022-06-02 PROCEDURE — 3078F DIAST BP <80 MM HG: CPT | Mod: S$GLB,,, | Performed by: STUDENT IN AN ORGANIZED HEALTH CARE EDUCATION/TRAINING PROGRAM

## 2022-06-02 RX ORDER — BENZONATATE 200 MG/1
200 CAPSULE ORAL 3 TIMES DAILY PRN
Qty: 30 CAPSULE | Refills: 0 | Status: SHIPPED | OUTPATIENT
Start: 2022-06-02 | End: 2022-06-12

## 2022-06-02 RX ORDER — CLINDAMYCIN HYDROCHLORIDE 300 MG/1
300 CAPSULE ORAL EVERY 8 HOURS
Qty: 21 CAPSULE | Refills: 0 | Status: SHIPPED | OUTPATIENT
Start: 2022-06-02 | End: 2022-06-09

## 2022-06-02 RX ORDER — BENZOCAINE/MENTHOL 15 MG-10MG
1 LOZENGE MUCOUS MEMBRANE
Qty: 30 LOZENGE | Refills: 0 | Status: SHIPPED | OUTPATIENT
Start: 2022-06-02 | End: 2023-01-17

## 2022-06-02 RX ORDER — FLUTICASONE PROPIONATE 50 MCG
1 SPRAY, SUSPENSION (ML) NASAL DAILY
Qty: 15.8 ML | Refills: 0 | Status: SHIPPED | OUTPATIENT
Start: 2022-06-02 | End: 2023-06-01 | Stop reason: SDUPTHER

## 2022-06-02 NOTE — PROGRESS NOTES
Subjective:       Patient ID: Anthony Apple is a 61 y.o. female.    Vitals:  oral temperature is 98.2 °F (36.8 °C). Her blood pressure is 127/74 and her pulse is 68. Her respiration is 16 and oxygen saturation is 98%.     Chief Complaint: Cough and Otalgia    Patient is a 61-year-old female with a past medical history of seasonal allergies, paroxysmal atrial fibrillation, hypothyroidism, GERD, and panic disorders who presents to clinic for evaluation of ear pain, sore throat and nonproductive cough.  Patient reports has experienced symptoms related to allergies for past 3-4 weeks however yesterday symptoms became worse.  Patient states she is vaccinated.  Patient denies any recent or known sick exposures.  Patient  has vacation in a few days and wants to feel better by this time.  Patient  has taken over-the-counter medications to include Claritin with moderate relief of symptoms.  Patient complaining of fatigue, left ear pain, left-sided hearing loss, nasal sinus congestion with postnasal drainage, left-sided sore throat, nonproductive cough, urinary frequency, and urinary urgency.  Patient states fatigue is related to seasonal allergies as she typically gets fatigued because of this.  Patient  also has environmental allergies to include pets.  Patient  has her child's pets at home which she is allergic to however they are trying to keep them  in a different room.  Patient reports left-sided sore throat secondary to tonsil stones.  Patient  has a history of tonsil stones and removed a fairly large when using a Q-tip yesterday.  Patient she also wonders if she has urinary tract infection.  Patient  does not have any burning sensation however has frequency and urgency.  Patient denies any acute headaches or dizziness, generalized body aches, fever or chills, chest pain or palpitations, shortness of breath or abnormal breath sounds, abdominal pain, nausea or vomiting,  diarrhea, rash, or change in mentation.    Cough  This is a new problem. The current episode started yesterday. The cough is non-productive. Associated symptoms include ear pain (Left ear), postnasal drip and a sore throat (Left-sided sore throat.). Pertinent negatives include no chest pain, chills, fever, headaches, myalgias, rash, shortness of breath or wheezing. Her past medical history is significant for environmental allergies.   Otalgia   There is pain in the left ear. This is a new problem. The current episode started yesterday. Associated symptoms include coughing, hearing loss (Left ear) and a sore throat (Left-sided sore throat.). Pertinent negatives include no abdominal pain, diarrhea, headaches, rash or vomiting.       Constitution: Positive for fatigue. Negative for chills, sweating and fever.   HENT: Positive for ear pain (Left ear), hearing loss (Left ear), congestion, postnasal drip and sore throat (Left-sided sore throat.). Negative for sinus pain, sinus pressure, trouble swallowing and voice change.    Neck: neck negative. Negative for painful lymph nodes.   Cardiovascular: Negative.  Negative for chest pain and palpitations.   Eyes: Negative.    Respiratory: Positive for cough. Negative for chest tightness, sputum production, shortness of breath and wheezing.    Gastrointestinal: Negative.  Negative for abdominal pain, nausea, vomiting and diarrhea.   Endocrine: negative.   Genitourinary: Positive for frequency and urgency. Negative for dysuria, flank pain, vaginal discharge, vaginal bleeding, vaginal odor and pelvic pain.   Musculoskeletal: Negative.  Negative for back pain and muscle ache.   Skin: Negative for color change, pale, rash and erythema.   Allergic/Immunologic: Positive for environmental allergies and seasonal allergies.   Neurological: Negative.  Negative for dizziness, light-headedness, passing out, headaches, disorientation and altered mental status.   Hematologic/Lymphatic:  Negative.  Negative for swollen lymph nodes.   Psychiatric/Behavioral: Negative.  Negative for altered mental status, disorientation and confusion.       Objective:      Physical Exam   Constitutional: She is oriented to person, place, and time. She appears well-developed. She is cooperative.  Non-toxic appearance. She does not appear ill. No distress.   HENT:   Head: Normocephalic and atraumatic.   Ears:   Right Ear: Hearing, tympanic membrane, external ear and ear canal normal.   Left Ear: External ear and ear canal normal. Tympanic membrane is erythematous. A middle ear effusion is present. Decreased hearing is noted.   Nose: Congestion present. No mucosal edema, rhinorrhea or nasal deformity. No epistaxis. Right sinus exhibits no maxillary sinus tenderness and no frontal sinus tenderness. Left sinus exhibits no maxillary sinus tenderness and no frontal sinus tenderness.   Mouth/Throat: Uvula is midline and mucous membranes are normal. Mucous membranes are moist. No trismus in the jaw. Normal dentition. No uvula swelling. Posterior oropharyngeal erythema present. No oropharyngeal exudate. Oropharynx is clear.   Eyes: Conjunctivae and lids are normal. Pupils are equal, round, and reactive to light. Right eye exhibits no discharge. Left eye exhibits no discharge. No scleral icterus.   Neck: Trachea normal and phonation normal. Neck supple. No neck rigidity present.   Cardiovascular: Normal rate, regular rhythm, normal heart sounds and normal pulses.   Pulmonary/Chest: Effort normal and breath sounds normal. No respiratory distress. She has no wheezes. She has no rhonchi. She has no rales.   Abdominal: Normal appearance and bowel sounds are normal. She exhibits no distension. Soft. There is no abdominal tenderness. There is no rebound, no guarding, no left CVA tenderness and no right CVA tenderness.   Musculoskeletal: Normal range of motion.         General: No deformity. Normal range of motion.      Cervical back:  She exhibits no tenderness.   Lymphadenopathy:     She has no cervical adenopathy.   Neurological: She is alert and oriented to person, place, and time. She exhibits normal muscle tone. Coordination normal.   Skin: Skin is warm, dry, intact, not diaphoretic, not pale and no rash. Capillary refill takes less than 2 seconds. No erythema   Psychiatric: Her speech is normal and behavior is normal. Judgment and thought content normal.   Nursing note and vitals reviewed.        Assessment:       1. Urinary frequency    2. Sore throat    3. Acute middle ear effusion, left    4. Tonsil stone          Plan:         Urinary frequency  -     POCT Urinalysis, Dipstick, Automated, W/O Scope  -     Urine culture    Sore throat  -     POCT rapid strep A    Acute middle ear effusion, left    Tonsil stone    Other orders  -     clindamycin (CLEOCIN) 300 MG capsule; Take 1 capsule (300 mg total) by mouth every 8 (eight) hours. for 7 days  Dispense: 21 capsule; Refill: 0  -     fluticasone propionate (FLONASE) 50 mcg/actuation nasal spray; 1 spray (50 mcg total) by Each Nostril route once daily.  Dispense: 15.8 mL; Refill: 0  -     benzocaine-menthoL (CHLORASEPTIC MAX) 15-10 mg Lozg; 1 lozenge by Mucous Membrane route every 2 (two) hours as needed (Sore throat).  Dispense: 30 lozenge; Refill: 0  -     benzonatate (TESSALON) 200 MG capsule; Take 1 capsule (200 mg total) by mouth 3 (three) times daily as needed for Cough.  Dispense: 30 capsule; Refill: 0                 Labs:  Rapid strep negative.  UA:  Positive blood, negative nitrate, negative leukocyte  Urine culture ordered, will call with results.  Take medications as prescribed.  Clindamycin for coverage of pharyngitis and otitis media.  Recommend warm salt water gargles every 2-3 hours while awake.  Tylenol/Motrin per package instructions for any pain or fever.  Recommend continued use of Claritin as directed.  Flonase plus antihistamine for middle ear effusion/eustachian tube  disorder.  Follow-up with PCP in 1-2 days.  Return to clinic as needed.  Follow-up with ENT as needed.  To ED for any new acutely worsening symptoms.  Patient in agreement with plan of care.    DISCLAIMER: Please note that my documentation in this Electronic Healthcare Record was produced using speech recognition software and therefore may contain errors related to that software system.These could include grammar, punctuation and spelling errors or the inclusion/exclusion of phrases that were not intended. Garbled syntax, mangled pronouns, and other bizarre constructions may be attributed to that software system.

## 2022-06-04 ENCOUNTER — TELEPHONE (OUTPATIENT)
Dept: URGENT CARE | Facility: CLINIC | Age: 62
End: 2022-06-04
Payer: COMMERCIAL

## 2022-06-04 DIAGNOSIS — H65.92 FLUID LEVEL BEHIND TYMPANIC MEMBRANE OF LEFT EAR: Primary | ICD-10-CM

## 2022-06-04 LAB
BACTERIA UR CULT: NO GROWTH
BACTERIA UR CULT: NORMAL

## 2022-06-04 RX ORDER — AMOXICILLIN 500 MG/1
500 TABLET, FILM COATED ORAL EVERY 12 HOURS
Qty: 14 TABLET | Refills: 0 | Status: SHIPPED | OUTPATIENT
Start: 2022-06-04 | End: 2022-06-11

## 2022-06-04 NOTE — TELEPHONE ENCOUNTER
Patient called and stated that she has been having itching since starting the clindamycin. Pt reports ear symptoms have not improved with treatment. Pt reports she usually responds well with amoxicillin. Allergies reviewed and discussed with patient. Notified patient of urine culture results and instructed her to stop the clindamycin. Amoxicillin called in to pharmacy of choice with instructions. Instructions given to follow up with PCP on Monday.

## 2022-06-13 ENCOUNTER — OFFICE VISIT (OUTPATIENT)
Dept: FAMILY MEDICINE | Facility: CLINIC | Age: 62
End: 2022-06-13
Payer: COMMERCIAL

## 2022-06-13 VITALS
WEIGHT: 172.38 LBS | BODY MASS INDEX: 24.68 KG/M2 | HEIGHT: 70 IN | HEART RATE: 78 BPM | DIASTOLIC BLOOD PRESSURE: 72 MMHG | TEMPERATURE: 98 F | SYSTOLIC BLOOD PRESSURE: 106 MMHG | OXYGEN SATURATION: 97 % | RESPIRATION RATE: 18 BRPM

## 2022-06-13 DIAGNOSIS — M81.0 AGE-RELATED OSTEOPOROSIS WITHOUT CURRENT PATHOLOGICAL FRACTURE: ICD-10-CM

## 2022-06-13 DIAGNOSIS — J30.2 SEASONAL ALLERGIES: ICD-10-CM

## 2022-06-13 DIAGNOSIS — F41.0 PANIC DISORDER: Primary | ICD-10-CM

## 2022-06-13 DIAGNOSIS — K21.9 GASTROESOPHAGEAL REFLUX DISEASE WITHOUT ESOPHAGITIS: ICD-10-CM

## 2022-06-13 DIAGNOSIS — Z79.899 LONG-TERM CURRENT USE OF BENZODIAZEPINE: ICD-10-CM

## 2022-06-13 DIAGNOSIS — I48.0 PAROXYSMAL ATRIAL FIBRILLATION: ICD-10-CM

## 2022-06-13 DIAGNOSIS — L85.3 DRY SKIN: ICD-10-CM

## 2022-06-13 DIAGNOSIS — E05.90 HYPERTHYROIDISM: ICD-10-CM

## 2022-06-13 LAB
AMP AMPHETAMINE 1000 NM/ML POC: NEGATIVE
BAR BARBITURATES 300 NG/ML POC: NEGATIVE
BUP BUPRENORPHINE 10 NG/ML POC: NEGATIVE
BZO BENZODIAZEPINES 300 NG/ML POC: NEGATIVE
COC COCAINE 300 NG/ML POC: NEGATIVE
CREATININE (CR) POC: 20
CTP QC/QA: YES
MET METHAMPHETAMINE 1000 NG/ML POC: NEGATIVE
MOP/OPI300 MORPHINE 300 NG/ML POC: NEGATIVE
MTD METHADONE 300 NG/ML POC: NEGATIVE
OXIDANT (OX) POC: NEGATIVE
OXY OXYCODONE 100 NG/ML POC: NEGATIVE
SPECIFIC GRAVITY (SG) POC: 1.01
TEMPERATURE (°F) POC: 92
THC MARIJUANA 50 NG/ML POC: NEGATIVE

## 2022-06-13 PROCEDURE — 99999 PR PBB SHADOW E&M-EST. PATIENT-LVL V: ICD-10-PCS | Mod: PBBFAC,,, | Performed by: STUDENT IN AN ORGANIZED HEALTH CARE EDUCATION/TRAINING PROGRAM

## 2022-06-13 PROCEDURE — 3074F PR MOST RECENT SYSTOLIC BLOOD PRESSURE < 130 MM HG: ICD-10-PCS | Mod: S$GLB,,, | Performed by: STUDENT IN AN ORGANIZED HEALTH CARE EDUCATION/TRAINING PROGRAM

## 2022-06-13 PROCEDURE — 1159F PR MEDICATION LIST DOCUMENTED IN MEDICAL RECORD: ICD-10-PCS | Mod: S$GLB,,, | Performed by: STUDENT IN AN ORGANIZED HEALTH CARE EDUCATION/TRAINING PROGRAM

## 2022-06-13 PROCEDURE — 1160F PR REVIEW ALL MEDS BY PRESCRIBER/CLIN PHARMACIST DOCUMENTED: ICD-10-PCS | Mod: S$GLB,,, | Performed by: STUDENT IN AN ORGANIZED HEALTH CARE EDUCATION/TRAINING PROGRAM

## 2022-06-13 PROCEDURE — 3074F SYST BP LT 130 MM HG: CPT | Mod: S$GLB,,, | Performed by: STUDENT IN AN ORGANIZED HEALTH CARE EDUCATION/TRAINING PROGRAM

## 2022-06-13 PROCEDURE — 99999 PR PBB SHADOW E&M-EST. PATIENT-LVL V: CPT | Mod: PBBFAC,,, | Performed by: STUDENT IN AN ORGANIZED HEALTH CARE EDUCATION/TRAINING PROGRAM

## 2022-06-13 PROCEDURE — 3078F PR MOST RECENT DIASTOLIC BLOOD PRESSURE < 80 MM HG: ICD-10-PCS | Mod: S$GLB,,, | Performed by: STUDENT IN AN ORGANIZED HEALTH CARE EDUCATION/TRAINING PROGRAM

## 2022-06-13 PROCEDURE — 3008F BODY MASS INDEX DOCD: CPT | Mod: S$GLB,,, | Performed by: STUDENT IN AN ORGANIZED HEALTH CARE EDUCATION/TRAINING PROGRAM

## 2022-06-13 PROCEDURE — 80305 DRUG TEST PRSMV DIR OPT OBS: CPT | Mod: QW,S$GLB,, | Performed by: STUDENT IN AN ORGANIZED HEALTH CARE EDUCATION/TRAINING PROGRAM

## 2022-06-13 PROCEDURE — 80305 POCT URINE DRUG SCREEN (WITH BUP): ICD-10-PCS | Mod: QW,S$GLB,, | Performed by: STUDENT IN AN ORGANIZED HEALTH CARE EDUCATION/TRAINING PROGRAM

## 2022-06-13 PROCEDURE — 99214 PR OFFICE/OUTPT VISIT, EST, LEVL IV, 30-39 MIN: ICD-10-PCS | Mod: S$GLB,,, | Performed by: STUDENT IN AN ORGANIZED HEALTH CARE EDUCATION/TRAINING PROGRAM

## 2022-06-13 PROCEDURE — 3078F DIAST BP <80 MM HG: CPT | Mod: S$GLB,,, | Performed by: STUDENT IN AN ORGANIZED HEALTH CARE EDUCATION/TRAINING PROGRAM

## 2022-06-13 PROCEDURE — 1159F MED LIST DOCD IN RCRD: CPT | Mod: S$GLB,,, | Performed by: STUDENT IN AN ORGANIZED HEALTH CARE EDUCATION/TRAINING PROGRAM

## 2022-06-13 PROCEDURE — 99214 OFFICE O/P EST MOD 30 MIN: CPT | Mod: S$GLB,,, | Performed by: STUDENT IN AN ORGANIZED HEALTH CARE EDUCATION/TRAINING PROGRAM

## 2022-06-13 PROCEDURE — 3008F PR BODY MASS INDEX (BMI) DOCUMENTED: ICD-10-PCS | Mod: S$GLB,,, | Performed by: STUDENT IN AN ORGANIZED HEALTH CARE EDUCATION/TRAINING PROGRAM

## 2022-06-13 PROCEDURE — 1160F RVW MEDS BY RX/DR IN RCRD: CPT | Mod: S$GLB,,, | Performed by: STUDENT IN AN ORGANIZED HEALTH CARE EDUCATION/TRAINING PROGRAM

## 2022-06-13 RX ORDER — ALPRAZOLAM 0.25 MG/1
0.25 TABLET ORAL DAILY PRN
Qty: 30 TABLET | Refills: 0 | Status: SHIPPED | OUTPATIENT
Start: 2022-06-13 | End: 2022-12-09 | Stop reason: SDUPTHER

## 2022-06-13 RX ORDER — AMMONIUM LACTATE 12 G/100G
LOTION TOPICAL 2 TIMES DAILY PRN
Qty: 225 G | Refills: 0 | Status: SHIPPED | OUTPATIENT
Start: 2022-06-13 | End: 2022-12-09 | Stop reason: SDUPTHER

## 2022-06-13 RX ORDER — MONTELUKAST SODIUM 10 MG/1
10 TABLET ORAL DAILY
Qty: 90 TABLET | Refills: 3 | Status: SHIPPED | OUTPATIENT
Start: 2022-06-13 | End: 2023-11-28 | Stop reason: SDUPTHER

## 2022-06-13 NOTE — PROGRESS NOTES
Subjective:       Patient ID: Anthony Apple is a 61 y.o. female.    Chief Complaint: Atrial Fibrillation, Hyperthyroidism, and panic disorder    Problem List Items Addressed This Visit        Panic disorder - Primary - rarely uses benzodiazepine for prn panic.  She has had two fills since September last year.  She is doing well. Having some stress with her son living with them but overall doing well.       Paroxysmal atrial fibrillation - stable. Has some intermittent palpitations but no heart racing. On metoprolol and doing well. On flecainide and seeing cardiology.       Hyperthyroidism - asymptmatic.   Lab Results       Component                Value               Date                       TSH                      1.590               09/20/2021                  Gastroesophageal reflux disease without esophagitis - no longer on prilosec.      Osteoporosis -  On fosamax.   Last dexa 2021     Bone mineral density in the lumbar spine from L1 through L4 is 0.766 g/cm2.     The T-score is -2.6 (standard deviations of Young Adult mean).     The Z-score is -1.1 (standard deviations of Age Matched mean).     The WHO classification is osteoporosis.  3.6% increase in lumbar spine bone mineral density compared to prior.     LEFT HIP:     Bone mineral density in the left femoral neck is 0.571 g/cm2.     The T-score is -2.5 (standard deviations of Young Adult mean).     The Z-score is -1.2 (standard deviations of Age Matched mean).     The WHO classification is osteoporosis.  8.5% increase in overall left hip bone mineral density compared to prior.     Impression:     She did have an episode where she felt pain and had ear pressure, some tonsil swelling with tonsil stones.  She had some changes in her hearing with fluid in her ears.  She was given antibiotics and  Got side effects from clindamycin (had palpitations) and then took amoxicillin and has been feeling better.     She had some sciatic nerve issues on vacation but  seems to be doing better.  Interested in PT if this flairs again.     She has had some blood in her urine intermittently when taking benadryl.   She has had a cystoscope done and no issues found.  Did not want to do a CT scan at that time and would like us to know about that.     Review of Systems   Constitutional: Positive for fatigue. Negative for activity change, appetite change and unexpected weight change.   HENT: Negative for trouble swallowing.    Eyes: Negative for visual disturbance.   Respiratory: Negative for shortness of breath.    Cardiovascular: Negative for chest pain and leg swelling.   Gastrointestinal: Negative for abdominal pain, blood in stool, change in bowel habit and change in bowel habit.   Endocrine: Negative for cold intolerance, heat intolerance, polydipsia and polyuria.   Genitourinary: Positive for hematuria. Negative for dysuria.   Musculoskeletal: Negative for arthralgias, back pain and gait problem.   Integumentary:  Negative for rash and wound.   Neurological: Negative for dizziness, weakness and headaches.   Psychiatric/Behavioral: Negative for dysphoric mood and sleep disturbance. The patient is nervous/anxious.          Objective:      Physical Exam  Constitutional:       General: She is not in acute distress.     Appearance: Normal appearance. She is not ill-appearing.   HENT:      Right Ear: Tympanic membrane, ear canal and external ear normal. There is no impacted cerumen.      Left Ear: Tympanic membrane, ear canal and external ear normal. There is no impacted cerumen.   Eyes:      Conjunctiva/sclera: Conjunctivae normal.   Cardiovascular:      Rate and Rhythm: Normal rate and regular rhythm.      Pulses: Normal pulses.      Heart sounds: Normal heart sounds. No murmur heard.  Pulmonary:      Effort: Pulmonary effort is normal. No respiratory distress.      Breath sounds: Normal breath sounds. No wheezing.   Abdominal:      Palpations: Abdomen is soft.   Musculoskeletal:       Right lower leg: No edema.      Left lower leg: No edema.   Skin:     General: Skin is warm and dry.      Findings: No rash.   Neurological:      Mental Status: She is alert. Mental status is at baseline.      Gait: Gait normal.   Psychiatric:         Mood and Affect: Mood normal.         Behavior: Behavior normal.         Thought Content: Thought content normal.         Judgment: Judgment normal.         Assessment:       1. Panic disorder    2. Paroxysmal atrial fibrillation    3. Hyperthyroidism    4. Gastroesophageal reflux disease without esophagitis    5. Age-related osteoporosis without current pathological fracture    6. Long-term current use of benzodiazepine    7. Seasonal allergies    8. Dry skin        Plan:       Problem List Items Addressed This Visit        Psychiatric    Panic disorder - Primary    Relevant Medications    ALPRAZolam (XANAX) 0.25 MG tablet       ENT    Seasonal allergies    Relevant Medications    montelukast (SINGULAIR) 10 mg tablet       Cardiac/Vascular    Paroxysmal atrial fibrillation       Endocrine    Hyperthyroidism       GI    Gastroesophageal reflux disease without esophagitis       Orthopedic    Osteoporosis      Other Visit Diagnoses     Long-term current use of benzodiazepine        Relevant Orders    POCT Urine Drug Screen (With BUP) (Completed)    Dry skin        Relevant Medications    ammonium lactate (LAC-HYDRIN) 12 % lotion          She is doing well  No changes today.  Continue current medications.  uds today appropriate.   reviewed and appropriate. Last fill about 4 months ago.    Medication List with Changes/Refills   Current Medications    ALENDRONATE (FOSAMAX) 70 MG TABLET    Fosamax 70 mg tablet       Start Date: --        End Date: --    ASPIRIN 81 MG CAP    Take 1 capsule by mouth every other day.       Start Date: --        End Date: --    AZELASTINE (ASTELIN) 137 MCG (0.1 %) NASAL SPRAY    2 sprays (274 mcg total) by Nasal route 2 (two) times daily.        Start Date: 9/3/2021  End Date: --    BENZOCAINE-MENTHOL (CHLORASEPTIC MAX) 15-10 MG LOZG    1 lozenge by Mucous Membrane route every 2 (two) hours as needed (Sore throat).       Start Date: 6/2/2022  End Date: --    BEPREVE 1.5 % DROP    PLACE 1 DROP INTO OU BID       Start Date: 11/5/2020 End Date: --    CALCIUM CARBONATE-VITAMIN D3 (CALTRATE 600 PLUS D) 600 MG (1,500 MG)-800 UNIT CHEW    Caltrate 600 plus D  600 mg (1,500 mg)-800 unit chewable tablet   Take 1 tablet twice a day by oral route.       Start Date: --        End Date: --    DAILY MULTI-VITAMIN ORAL    Daily Multi-Vitamin   one tablet daily       Start Date: --        End Date: --    DIPHENHYDRAMINE (BENADRYL) 25 MG CAPSULE    Take 25 mg by mouth every 6 (six) hours as needed for Itching.       Start Date: --        End Date: --    DIPHENHYDRAMINE HCL (BENADRYL ALLERGY ORAL)    Benadryl   qpm       Start Date: --        End Date: --    FLECAINIDE (TAMBOCOR) 100 MG TAB    Take 100 mg by mouth as needed.       Start Date: --        End Date: --    FLECAINIDE (TAMBOCOR) 100 MG TAB    Take 1 tablet (100 mg total) by mouth once daily.       Start Date: 9/20/2021 End Date: 9/20/2022    FLUTICASONE PROPIONATE (FLONASE) 50 MCG/ACTUATION NASAL SPRAY    1 spray (50 mcg total) by Each Nostril route once daily.       Start Date: 6/2/2022  End Date: --    METOPROLOL SUCCINATE (TOPROL-XL) 25 MG 24 HR TABLET    Take 1 tablet (25 mg total) by mouth once daily. Pt takes 1/2       Start Date: 9/3/2021  End Date: --    MULTIVITAMIN ORAL    multivitamin       Start Date: --        End Date: --    OMEPRAZOLE (PRILOSEC) 40 MG CAPSULE    Take 1 capsule (40 mg total) by mouth daily as needed (reflux).       Start Date: 9/3/2021  End Date: --    ONDANSETRON (ZOFRAN-ODT) 4 MG TBDL    Take 2 tablets (8 mg total) by mouth every 6 (six) hours as needed (nausea).       Start Date: 1/13/2022 End Date: --    TRIAMCINOLONE ACETONIDE 0.5% (KENALOG) 0.5 % CREA    Apply topically 2  (two) times daily.       Start Date: 1/13/2022 End Date: --   Changed and/or Refilled Medications    Modified Medication Previous Medication    ALPRAZOLAM (XANAX) 0.25 MG TABLET ALPRAZolam (XANAX) 0.25 MG tablet       Take 1 tablet (0.25 mg total) by mouth daily as needed for Anxiety.    Take 1 tablet (0.25 mg total) by mouth daily as needed for Anxiety.       Start Date: 6/13/2022 End Date: --    Start Date: 2/7/2022  End Date: 6/13/2022    AMMONIUM LACTATE (LAC-HYDRIN) 12 % LOTION ammonium lactate (LAC-HYDRIN) 12 % lotion       Apply topically 2 (two) times daily as needed for Dry Skin.    Apply topically 2 (two) times daily as needed for Dry Skin.       Start Date: 6/13/2022 End Date: --    Start Date: 9/3/2021  End Date: 6/13/2022    MONTELUKAST (SINGULAIR) 10 MG TABLET montelukast (SINGULAIR) 10 mg tablet       Take 1 tablet (10 mg total) by mouth once daily.    Take 1 tablet (10 mg total) by mouth once daily.       Start Date: 6/13/2022 End Date: --    Start Date: 4/20/2022 End Date: 6/13/2022

## 2022-07-20 ENCOUNTER — PATIENT MESSAGE (OUTPATIENT)
Dept: FAMILY MEDICINE | Facility: CLINIC | Age: 62
End: 2022-07-20
Payer: COMMERCIAL

## 2022-07-20 DIAGNOSIS — R53.83 FATIGUE, UNSPECIFIED TYPE: Primary | ICD-10-CM

## 2022-07-21 ENCOUNTER — OFFICE VISIT (OUTPATIENT)
Dept: URGENT CARE | Facility: CLINIC | Age: 62
End: 2022-07-21
Payer: COMMERCIAL

## 2022-07-21 VITALS
SYSTOLIC BLOOD PRESSURE: 135 MMHG | OXYGEN SATURATION: 98 % | HEART RATE: 62 BPM | DIASTOLIC BLOOD PRESSURE: 79 MMHG | WEIGHT: 172 LBS | BODY MASS INDEX: 24.68 KG/M2 | TEMPERATURE: 98 F | RESPIRATION RATE: 16 BRPM

## 2022-07-21 DIAGNOSIS — J30.2 SEASONAL ALLERGIES: ICD-10-CM

## 2022-07-21 DIAGNOSIS — R09.81 SINUS CONGESTION: Primary | ICD-10-CM

## 2022-07-21 DIAGNOSIS — R51.9 GENERALIZED HEADACHES: ICD-10-CM

## 2022-07-21 DIAGNOSIS — J01.40 ACUTE NON-RECURRENT PANSINUSITIS: ICD-10-CM

## 2022-07-21 LAB
CTP QC/QA: YES
SARS-COV-2 AG RESP QL IA.RAPID: NEGATIVE

## 2022-07-21 PROCEDURE — 3078F PR MOST RECENT DIASTOLIC BLOOD PRESSURE < 80 MM HG: ICD-10-PCS | Mod: S$GLB,,, | Performed by: STUDENT IN AN ORGANIZED HEALTH CARE EDUCATION/TRAINING PROGRAM

## 2022-07-21 PROCEDURE — 3075F SYST BP GE 130 - 139MM HG: CPT | Mod: S$GLB,,, | Performed by: STUDENT IN AN ORGANIZED HEALTH CARE EDUCATION/TRAINING PROGRAM

## 2022-07-21 PROCEDURE — 1159F PR MEDICATION LIST DOCUMENTED IN MEDICAL RECORD: ICD-10-PCS | Mod: S$GLB,,, | Performed by: STUDENT IN AN ORGANIZED HEALTH CARE EDUCATION/TRAINING PROGRAM

## 2022-07-21 PROCEDURE — 99214 PR OFFICE/OUTPT VISIT, EST, LEVL IV, 30-39 MIN: ICD-10-PCS | Mod: S$GLB,,, | Performed by: STUDENT IN AN ORGANIZED HEALTH CARE EDUCATION/TRAINING PROGRAM

## 2022-07-21 PROCEDURE — 1160F RVW MEDS BY RX/DR IN RCRD: CPT | Mod: S$GLB,,, | Performed by: STUDENT IN AN ORGANIZED HEALTH CARE EDUCATION/TRAINING PROGRAM

## 2022-07-21 PROCEDURE — 3008F PR BODY MASS INDEX (BMI) DOCUMENTED: ICD-10-PCS | Mod: S$GLB,,, | Performed by: STUDENT IN AN ORGANIZED HEALTH CARE EDUCATION/TRAINING PROGRAM

## 2022-07-21 PROCEDURE — 3075F PR MOST RECENT SYSTOLIC BLOOD PRESS GE 130-139MM HG: ICD-10-PCS | Mod: S$GLB,,, | Performed by: STUDENT IN AN ORGANIZED HEALTH CARE EDUCATION/TRAINING PROGRAM

## 2022-07-21 PROCEDURE — 1159F MED LIST DOCD IN RCRD: CPT | Mod: S$GLB,,, | Performed by: STUDENT IN AN ORGANIZED HEALTH CARE EDUCATION/TRAINING PROGRAM

## 2022-07-21 PROCEDURE — 3008F BODY MASS INDEX DOCD: CPT | Mod: S$GLB,,, | Performed by: STUDENT IN AN ORGANIZED HEALTH CARE EDUCATION/TRAINING PROGRAM

## 2022-07-21 PROCEDURE — 87811 SARS CORONAVIRUS 2 ANTIGEN POCT, MANUAL READ: ICD-10-PCS | Mod: QW,S$GLB,, | Performed by: STUDENT IN AN ORGANIZED HEALTH CARE EDUCATION/TRAINING PROGRAM

## 2022-07-21 PROCEDURE — 99214 OFFICE O/P EST MOD 30 MIN: CPT | Mod: S$GLB,,, | Performed by: STUDENT IN AN ORGANIZED HEALTH CARE EDUCATION/TRAINING PROGRAM

## 2022-07-21 PROCEDURE — 3078F DIAST BP <80 MM HG: CPT | Mod: S$GLB,,, | Performed by: STUDENT IN AN ORGANIZED HEALTH CARE EDUCATION/TRAINING PROGRAM

## 2022-07-21 PROCEDURE — 1160F PR REVIEW ALL MEDS BY PRESCRIBER/CLIN PHARMACIST DOCUMENTED: ICD-10-PCS | Mod: S$GLB,,, | Performed by: STUDENT IN AN ORGANIZED HEALTH CARE EDUCATION/TRAINING PROGRAM

## 2022-07-21 PROCEDURE — 87811 SARS-COV-2 COVID19 W/OPTIC: CPT | Mod: QW,S$GLB,, | Performed by: STUDENT IN AN ORGANIZED HEALTH CARE EDUCATION/TRAINING PROGRAM

## 2022-07-21 RX ORDER — AZELASTINE 1 MG/ML
2 SPRAY, METERED NASAL 2 TIMES DAILY
Qty: 30 ML | Refills: 1 | Status: SHIPPED | OUTPATIENT
Start: 2022-07-21 | End: 2022-12-09 | Stop reason: SDUPTHER

## 2022-07-21 RX ORDER — DOXYCYCLINE HYCLATE 100 MG
100 TABLET ORAL 2 TIMES DAILY
Qty: 20 TABLET | Refills: 0 | Status: SHIPPED | OUTPATIENT
Start: 2022-07-21 | End: 2022-07-31

## 2022-07-21 NOTE — PROGRESS NOTES
Subjective:       Patient ID: Anthony Apple is a 61 y.o. female.    Vitals:  weight is 78 kg (172 lb). Her temperature is 98.2 °F (36.8 °C). Her blood pressure is 135/79 and her pulse is 62. Her respiration is 16 and oxygen saturation is 98%.     Chief Complaint: Sinus Problem    Patient is a 61-year-old female with a past medical history of seasonal allergies, paroxysmal atrial fibrillation, hypothyroidism, GERD, and panic disorders who presents to clinic for evaluation sinus related issues.  Patient reports symptoms for nearly 2 weeks.  Patient reports symptoms improved from previous visit however returned now approximately 2 weeks ago.  Patient reports felt well when she traveled out of state however believes much of her symptoms are related to her son's pets that are in her house.  Patient reports has used Benadryl, Singulair, and Flonase for symptoms.  Patient states has not continued much Flonase as this causes headaches.  Patient states does have relief of symptoms with Benadryl and Singulair to some extent.  Patient reports he is vaccinated.  Patient denies any recent or known sick exposures.  Patient believes symptoms are related to her sinuses.  Patient complaining of fatigue, intermittent chills, nasal sinus congestion with postnasal drainage, sinus pain and pressure, scratchy throat due to postnasal drainage, an infrequent nonproductive cough due to postnasal drainage, intermittent generalized body aches, sinus related headaches, and ear fullness.  Patient denies any acute dizziness, drainage from ears, tinnitus, hearing loss, drooling, painful or trouble swallowing, chest pain or palpitations, shortness of breath or presence of abnormal breath sounds, abdominal pain, nausea or vomiting, diarrhea, urinary symptoms, rash, or change in mentation.  Patient reports she is following her primary care doctor for thyroid disease.  Patient states has not followed up with ENT for symptoms.    Sinus  Problem  This is a new problem. The current episode started 1 to 4 weeks ago. The problem is unchanged. There has been no fever. Associated symptoms include chills, congestion, coughing, ear pain (Ear fullness), headaches (Sinus related), sinus pressure and a sore throat (Scratchy throat). Pertinent negatives include no shortness of breath. Past treatments include acetaminophen and oral decongestants.       Constitution: Positive for chills and fatigue.   HENT: Positive for ear pain (Ear fullness), congestion, postnasal drip, sinus pain, sinus pressure and sore throat (Scratchy throat). Negative for ear discharge, tinnitus, hearing loss, trouble swallowing and voice change.    Neck: neck negative. Negative for painful lymph nodes.   Cardiovascular: Negative for chest pain and palpitations.   Eyes: Negative.    Respiratory: Positive for cough. Negative for chest tightness, sputum production, shortness of breath and wheezing.    Gastrointestinal: Negative.  Negative for abdominal pain, nausea, vomiting and diarrhea.   Endocrine: negative.   Genitourinary: Negative.  Negative for dysuria, frequency and urgency.   Musculoskeletal: Positive for muscle ache.   Skin: Negative.  Negative for color change, pale, rash and erythema.   Allergic/Immunologic: Positive for seasonal allergies.   Neurological: Positive for headaches (Sinus related). Negative for dizziness, light-headedness, passing out, disorientation and altered mental status.   Hematologic/Lymphatic: Negative.  Negative for swollen lymph nodes.   Psychiatric/Behavioral: Negative.  Negative for altered mental status, disorientation and confusion.       Objective:      Physical Exam   Constitutional: She is oriented to person, place, and time. She appears well-developed. She is cooperative.  Non-toxic appearance. She does not appear ill. No distress.   HENT:   Head: Normocephalic and atraumatic.   Ears:   Right Ear: Hearing, tympanic membrane, external ear and ear  canal normal.   Left Ear: Hearing, tympanic membrane, external ear and ear canal normal.   Nose: Congestion present. No mucosal edema, rhinorrhea or nasal deformity. No epistaxis. Right sinus exhibits maxillary sinus tenderness and frontal sinus tenderness. Left sinus exhibits maxillary sinus tenderness and frontal sinus tenderness.   Mouth/Throat: Uvula is midline and mucous membranes are normal. Mucous membranes are moist. No trismus in the jaw. Normal dentition. No uvula swelling. Posterior oropharyngeal erythema (Mildly erythemic with postnasal drainage oropharynx) present. No oropharyngeal exudate. Oropharynx is clear.   Eyes: Conjunctivae and lids are normal. Pupils are equal, round, and reactive to light. Right eye exhibits no discharge. Left eye exhibits no discharge. No scleral icterus.   Neck: Trachea normal and phonation normal. Neck supple.   Cardiovascular: Normal rate, regular rhythm, normal heart sounds and normal pulses.   Pulmonary/Chest: Effort normal and breath sounds normal. No respiratory distress (Unlabored.  Equal rise and fall of chest.  Able speak in full complete sentences.  No adventitious breath sounds noted.). She has no wheezes. She has no rhonchi. She has no rales.   Abdominal: Normal appearance and bowel sounds are normal. She exhibits no distension. Soft. There is no abdominal tenderness.   Musculoskeletal: Normal range of motion.         General: No deformity. Normal range of motion.   Neurological: She is alert and oriented to person, place, and time. She exhibits normal muscle tone. Coordination normal.   Skin: Skin is warm, dry, intact, not diaphoretic, not pale and no rash. Capillary refill takes less than 2 seconds. No erythema   Psychiatric: Her speech is normal and behavior is normal. Judgment and thought content normal.   Nursing note and vitals reviewed.        Assessment:       1. Sinus congestion    2. Generalized headaches    3. Acute non-recurrent pansinusitis    4.  Seasonal allergies          Plan:         Sinus congestion  -     SARS Coronavirus 2 Antigen, POCT Manual Read    Generalized headaches  -     SARS Coronavirus 2 Antigen, POCT Manual Read    Acute non-recurrent pansinusitis    Seasonal allergies  -     azelastine (ASTELIN) 137 mcg (0.1 %) nasal spray; 2 sprays (274 mcg total) by Nasal route 2 (two) times daily.  Dispense: 30 mL; Refill: 1    Other orders  -     doxycycline (VIBRA-TABS) 100 MG tablet; Take 1 tablet (100 mg total) by mouth 2 (two) times daily. for 10 days  Dispense: 20 tablet; Refill: 0                 Labs:  COVID negative.  Take medications as prescribed.  Recommend continued use of antihistamines as directed.  Recommend use of Neti pot or nasal saline irrigation as directed.  Tylenol/Motrin per package instructions for any pain or fever.  Assure adequate hydration.  Follow-up with PCP within 1-2 days.  Return to clinic as needed.  Follow-up with ENT as needed.  To ED for any new acutely worsening symptoms.  Patient in agreement with plan of care.    DISCLAIMER: Please note that my documentation in this Electronic Healthcare Record was produced using speech recognition software and therefore may contain errors related to that software system.These could include grammar, punctuation and spelling errors or the inclusion/exclusion of phrases that were not intended. Garbled syntax, mangled pronouns, and other bizarre constructions may be attributed to that software system.

## 2022-07-22 NOTE — TELEPHONE ENCOUNTER
Spoke with pt who stated she has a sinus infection and she believes this may be why she was feeling fatigued.     Assisted pt in scheduling lab work for next week.

## 2022-07-27 ENCOUNTER — LAB VISIT (OUTPATIENT)
Dept: LAB | Facility: CLINIC | Age: 62
End: 2022-07-27
Payer: COMMERCIAL

## 2022-07-27 DIAGNOSIS — R53.83 FATIGUE, UNSPECIFIED TYPE: ICD-10-CM

## 2022-07-27 LAB — TSH SERPL DL<=0.005 MIU/L-ACNC: 1.21 UIU/ML (ref 0.4–4)

## 2022-07-27 PROCEDURE — 84443 ASSAY THYROID STIM HORMONE: CPT | Performed by: STUDENT IN AN ORGANIZED HEALTH CARE EDUCATION/TRAINING PROGRAM

## 2022-09-06 ENCOUNTER — OFFICE VISIT (OUTPATIENT)
Dept: URGENT CARE | Facility: CLINIC | Age: 62
End: 2022-09-06
Payer: COMMERCIAL

## 2022-09-06 VITALS
BODY MASS INDEX: 24.62 KG/M2 | SYSTOLIC BLOOD PRESSURE: 120 MMHG | HEART RATE: 81 BPM | TEMPERATURE: 98 F | HEIGHT: 70 IN | OXYGEN SATURATION: 95 % | RESPIRATION RATE: 18 BRPM | WEIGHT: 172 LBS | DIASTOLIC BLOOD PRESSURE: 75 MMHG

## 2022-09-06 DIAGNOSIS — G43.009 MIGRAINE WITHOUT AURA AND WITHOUT STATUS MIGRAINOSUS, NOT INTRACTABLE: Primary | ICD-10-CM

## 2022-09-06 DIAGNOSIS — R52 BODY ACHES: ICD-10-CM

## 2022-09-06 DIAGNOSIS — R11.2 NON-INTRACTABLE VOMITING WITH NAUSEA, UNSPECIFIED VOMITING TYPE: ICD-10-CM

## 2022-09-06 DIAGNOSIS — R51.9 NONINTRACTABLE HEADACHE, UNSPECIFIED CHRONICITY PATTERN, UNSPECIFIED HEADACHE TYPE: ICD-10-CM

## 2022-09-06 LAB
CTP QC/QA: YES
CTP QC/QA: YES
FLUAV AG NPH QL: NEGATIVE
FLUBV AG NPH QL: NEGATIVE
SARS-COV-2 AG RESP QL IA.RAPID: NEGATIVE

## 2022-09-06 PROCEDURE — 1160F PR REVIEW ALL MEDS BY PRESCRIBER/CLIN PHARMACIST DOCUMENTED: ICD-10-PCS | Mod: S$GLB,,, | Performed by: NURSE PRACTITIONER

## 2022-09-06 PROCEDURE — 3078F PR MOST RECENT DIASTOLIC BLOOD PRESSURE < 80 MM HG: ICD-10-PCS | Mod: S$GLB,,, | Performed by: NURSE PRACTITIONER

## 2022-09-06 PROCEDURE — 96372 PR INJECTION,THERAP/PROPH/DIAG2ST, IM OR SUBCUT: ICD-10-PCS | Mod: S$GLB,,, | Performed by: EMERGENCY MEDICINE

## 2022-09-06 PROCEDURE — 87811 SARS CORONAVIRUS 2 ANTIGEN POCT, MANUAL READ: ICD-10-PCS | Mod: QW,S$GLB,, | Performed by: NURSE PRACTITIONER

## 2022-09-06 PROCEDURE — 3008F BODY MASS INDEX DOCD: CPT | Mod: S$GLB,,, | Performed by: NURSE PRACTITIONER

## 2022-09-06 PROCEDURE — 3078F DIAST BP <80 MM HG: CPT | Mod: S$GLB,,, | Performed by: NURSE PRACTITIONER

## 2022-09-06 PROCEDURE — 87804 POCT INFLUENZA A/B: ICD-10-PCS | Mod: 59,QW,, | Performed by: NURSE PRACTITIONER

## 2022-09-06 PROCEDURE — 3008F PR BODY MASS INDEX (BMI) DOCUMENTED: ICD-10-PCS | Mod: S$GLB,,, | Performed by: NURSE PRACTITIONER

## 2022-09-06 PROCEDURE — 1159F MED LIST DOCD IN RCRD: CPT | Mod: S$GLB,,, | Performed by: NURSE PRACTITIONER

## 2022-09-06 PROCEDURE — 1160F RVW MEDS BY RX/DR IN RCRD: CPT | Mod: S$GLB,,, | Performed by: NURSE PRACTITIONER

## 2022-09-06 PROCEDURE — 1159F PR MEDICATION LIST DOCUMENTED IN MEDICAL RECORD: ICD-10-PCS | Mod: S$GLB,,, | Performed by: NURSE PRACTITIONER

## 2022-09-06 PROCEDURE — 99214 PR OFFICE/OUTPT VISIT, EST, LEVL IV, 30-39 MIN: ICD-10-PCS | Mod: S$GLB,,, | Performed by: NURSE PRACTITIONER

## 2022-09-06 PROCEDURE — 96372 THER/PROPH/DIAG INJ SC/IM: CPT | Mod: S$GLB,,, | Performed by: EMERGENCY MEDICINE

## 2022-09-06 PROCEDURE — 3074F SYST BP LT 130 MM HG: CPT | Mod: S$GLB,,, | Performed by: NURSE PRACTITIONER

## 2022-09-06 PROCEDURE — 87811 SARS-COV-2 COVID19 W/OPTIC: CPT | Mod: QW,S$GLB,, | Performed by: NURSE PRACTITIONER

## 2022-09-06 PROCEDURE — 87804 INFLUENZA ASSAY W/OPTIC: CPT | Mod: QW,,, | Performed by: NURSE PRACTITIONER

## 2022-09-06 PROCEDURE — 99214 OFFICE O/P EST MOD 30 MIN: CPT | Mod: S$GLB,,, | Performed by: NURSE PRACTITIONER

## 2022-09-06 PROCEDURE — 3074F PR MOST RECENT SYSTOLIC BLOOD PRESSURE < 130 MM HG: ICD-10-PCS | Mod: S$GLB,,, | Performed by: NURSE PRACTITIONER

## 2022-09-06 RX ORDER — KETOROLAC TROMETHAMINE 30 MG/ML
60 INJECTION, SOLUTION INTRAMUSCULAR; INTRAVENOUS
Status: COMPLETED | OUTPATIENT
Start: 2022-09-06 | End: 2022-09-06

## 2022-09-06 RX ORDER — ONDANSETRON 4 MG/1
4 TABLET, ORALLY DISINTEGRATING ORAL EVERY 8 HOURS PRN
Qty: 21 TABLET | Refills: 0 | Status: SHIPPED | OUTPATIENT
Start: 2022-09-06 | End: 2022-09-13

## 2022-09-06 RX ADMIN — KETOROLAC TROMETHAMINE 60 MG: 30 INJECTION, SOLUTION INTRAMUSCULAR; INTRAVENOUS at 01:09

## 2022-09-06 NOTE — PROGRESS NOTES
"Subjective:       Patient ID: Anthony Apple is a 61 y.o. female.    Vitals:  height is 5' 10" (1.778 m) and weight is 78 kg (172 lb). Her oral temperature is 98.1 °F (36.7 °C). Her blood pressure is 120/75 and her pulse is 81. Her respiration is 18 and oxygen saturation is 95%.     Chief Complaint: Generalized Body Aches and Headache    Anthony Apple presents to clinic with cough, body aches, headache that has been present for the last 3 days.  Patient denies any other symptoms at this time.  Patient has tried over-the-counter medications without relief.    Headache   This is a new problem. The current episode started in the past 7 days. The problem occurs constantly. The problem has been gradually worsening. The pain is located in the Frontal and temporal region. The pain does not radiate. The pain quality is not similar to prior headaches. The quality of the pain is described as aching, stabbing and shooting. The pain is at a severity of 9/10. The pain is severe. Associated symptoms include coughing and muscle aches.     Constitution: Negative.   HENT: Negative.     Neck: neck negative.   Cardiovascular: Negative.    Eyes: Negative.    Respiratory:  Positive for cough.    Gastrointestinal: Negative.    Endocrine: negative.   Genitourinary: Negative.    Musculoskeletal: Negative.    Skin: Negative.    Allergic/Immunologic: Negative.    Neurological:  Positive for headaches.     Objective:      Physical Exam   Constitutional: She is oriented to person, place, and time. She appears well-developed. She is cooperative.  Non-toxic appearance. She appears ill. No distress.   HENT:   Head: Normocephalic and atraumatic.   Ears:   Right Ear: Hearing, external ear and ear canal normal. Tympanic membrane is injected. A middle ear effusion is present.   Left Ear: Hearing, external ear and ear canal normal. Tympanic membrane is injected. A middle ear effusion is present.   Nose: Nose normal. No mucosal edema, rhinorrhea " or nasal deformity. No epistaxis. Right sinus exhibits no maxillary sinus tenderness and no frontal sinus tenderness. Left sinus exhibits no maxillary sinus tenderness and no frontal sinus tenderness.   Mouth/Throat: Uvula is midline and mucous membranes are normal. No trismus in the jaw. Normal dentition. No uvula swelling. No oropharyngeal exudate, posterior oropharyngeal edema or posterior oropharyngeal erythema.   Eyes: Conjunctivae and lids are normal. No scleral icterus.   Neck: Trachea normal and phonation normal. Neck supple. No edema present. No erythema present. No neck rigidity present.   Cardiovascular: Normal rate, regular rhythm, normal heart sounds and normal pulses.   Pulmonary/Chest: Effort normal and breath sounds normal. No respiratory distress. She has no decreased breath sounds. She has no rhonchi.   Abdominal: Normal appearance.   Musculoskeletal: Normal range of motion.         General: No deformity. Normal range of motion.   Neurological: She is alert and oriented to person, place, and time. She exhibits normal muscle tone. Coordination normal.   Skin: Skin is warm, dry, intact, not diaphoretic and not pale.   Psychiatric: Her speech is normal and behavior is normal. Judgment and thought content normal.   Nursing note and vitals reviewed.      Assessment:       1. Migraine without aura and without status migrainosus, not intractable    2. Nonintractable headache, unspecified chronicity pattern, unspecified headache type    3. Body aches    4. Non-intractable vomiting with nausea, unspecified vomiting type            Plan:         Migraine without aura and without status migrainosus, not intractable  -     ketorolac injection 60 mg  -     POCT INFLUENZA A/B    Nonintractable headache, unspecified chronicity pattern, unspecified headache type  -     SARS Coronavirus 2 Antigen, POCT Manual Read  -     POCT INFLUENZA A/B    Body aches  -     POCT INFLUENZA A/B    Non-intractable vomiting with  nausea, unspecified vomiting type  -     ondansetron (ZOFRAN-ODT) 4 MG TbDL; Take 1 tablet (4 mg total) by mouth every 8 (eight) hours as needed (nausea).  Dispense: 21 tablet; Refill: 0  -     POCT INFLUENZA A/B

## 2022-09-12 ENCOUNTER — OFFICE VISIT (OUTPATIENT)
Dept: FAMILY MEDICINE | Facility: CLINIC | Age: 62
End: 2022-09-12
Payer: COMMERCIAL

## 2022-09-12 VITALS
DIASTOLIC BLOOD PRESSURE: 79 MMHG | HEIGHT: 70 IN | BODY MASS INDEX: 25.05 KG/M2 | RESPIRATION RATE: 18 BRPM | TEMPERATURE: 99 F | OXYGEN SATURATION: 98 % | HEART RATE: 81 BPM | SYSTOLIC BLOOD PRESSURE: 124 MMHG | WEIGHT: 175 LBS

## 2022-09-12 DIAGNOSIS — E05.90 HYPERTHYROIDISM: ICD-10-CM

## 2022-09-12 DIAGNOSIS — F43.21 SITUATIONAL DEPRESSION: ICD-10-CM

## 2022-09-12 DIAGNOSIS — I48.0 PAF (PAROXYSMAL ATRIAL FIBRILLATION): ICD-10-CM

## 2022-09-12 DIAGNOSIS — M81.0 AGE-RELATED OSTEOPOROSIS WITHOUT CURRENT PATHOLOGICAL FRACTURE: ICD-10-CM

## 2022-09-12 DIAGNOSIS — R63.1 POLYDIPSIA: ICD-10-CM

## 2022-09-12 DIAGNOSIS — E87.1 HYPONATREMIA: ICD-10-CM

## 2022-09-12 DIAGNOSIS — K21.9 GASTROESOPHAGEAL REFLUX DISEASE WITHOUT ESOPHAGITIS: ICD-10-CM

## 2022-09-12 DIAGNOSIS — N30.01 ACUTE CYSTITIS WITH HEMATURIA: ICD-10-CM

## 2022-09-12 DIAGNOSIS — F41.0 PANIC DISORDER: Primary | ICD-10-CM

## 2022-09-12 DIAGNOSIS — H93.8X3 EAR FULLNESS, BILATERAL: ICD-10-CM

## 2022-09-12 DIAGNOSIS — M54.31 SCIATICA OF RIGHT SIDE: ICD-10-CM

## 2022-09-12 DIAGNOSIS — R73.9 HYPERGLYCEMIA: ICD-10-CM

## 2022-09-12 DIAGNOSIS — H93.19 TINNITUS, UNSPECIFIED LATERALITY: ICD-10-CM

## 2022-09-12 LAB
BILIRUB SERPL-MCNC: NEGATIVE MG/DL
BLOOD URINE, POC: NEGATIVE
CLARITY, POC UA: CLEAR
COLOR, POC UA: YELLOW
GLUCOSE UR QL STRIP: NEGATIVE
KETONES UR QL STRIP: NEGATIVE
LEUKOCYTE ESTERASE URINE, POC: NEGATIVE
NITRITE, POC UA: NEGATIVE
PH, POC UA: 7.5
PROTEIN, POC: NEGATIVE
SPECIFIC GRAVITY, POC UA: 1.02
UROBILINOGEN, POC UA: 0.2

## 2022-09-12 PROCEDURE — 99999 PR PBB SHADOW E&M-EST. PATIENT-LVL V: ICD-10-PCS | Mod: PBBFAC,,, | Performed by: STUDENT IN AN ORGANIZED HEALTH CARE EDUCATION/TRAINING PROGRAM

## 2022-09-12 PROCEDURE — 99214 OFFICE O/P EST MOD 30 MIN: CPT | Mod: S$GLB,,, | Performed by: STUDENT IN AN ORGANIZED HEALTH CARE EDUCATION/TRAINING PROGRAM

## 2022-09-12 PROCEDURE — 1160F RVW MEDS BY RX/DR IN RCRD: CPT | Mod: S$GLB,,, | Performed by: STUDENT IN AN ORGANIZED HEALTH CARE EDUCATION/TRAINING PROGRAM

## 2022-09-12 PROCEDURE — 3078F PR MOST RECENT DIASTOLIC BLOOD PRESSURE < 80 MM HG: ICD-10-PCS | Mod: S$GLB,,, | Performed by: STUDENT IN AN ORGANIZED HEALTH CARE EDUCATION/TRAINING PROGRAM

## 2022-09-12 PROCEDURE — 99214 PR OFFICE/OUTPT VISIT, EST, LEVL IV, 30-39 MIN: ICD-10-PCS | Mod: S$GLB,,, | Performed by: STUDENT IN AN ORGANIZED HEALTH CARE EDUCATION/TRAINING PROGRAM

## 2022-09-12 PROCEDURE — 99999 PR PBB SHADOW E&M-EST. PATIENT-LVL V: CPT | Mod: PBBFAC,,, | Performed by: STUDENT IN AN ORGANIZED HEALTH CARE EDUCATION/TRAINING PROGRAM

## 2022-09-12 PROCEDURE — 81002 POCT URINE DIPSTICK WITHOUT MICROSCOPE: ICD-10-PCS | Mod: S$GLB,,, | Performed by: STUDENT IN AN ORGANIZED HEALTH CARE EDUCATION/TRAINING PROGRAM

## 2022-09-12 PROCEDURE — 81002 URINALYSIS NONAUTO W/O SCOPE: CPT | Mod: S$GLB,,, | Performed by: STUDENT IN AN ORGANIZED HEALTH CARE EDUCATION/TRAINING PROGRAM

## 2022-09-12 PROCEDURE — 3074F SYST BP LT 130 MM HG: CPT | Mod: S$GLB,,, | Performed by: STUDENT IN AN ORGANIZED HEALTH CARE EDUCATION/TRAINING PROGRAM

## 2022-09-12 PROCEDURE — 1159F MED LIST DOCD IN RCRD: CPT | Mod: S$GLB,,, | Performed by: STUDENT IN AN ORGANIZED HEALTH CARE EDUCATION/TRAINING PROGRAM

## 2022-09-12 PROCEDURE — 3008F BODY MASS INDEX DOCD: CPT | Mod: S$GLB,,, | Performed by: STUDENT IN AN ORGANIZED HEALTH CARE EDUCATION/TRAINING PROGRAM

## 2022-09-12 PROCEDURE — 3008F PR BODY MASS INDEX (BMI) DOCUMENTED: ICD-10-PCS | Mod: S$GLB,,, | Performed by: STUDENT IN AN ORGANIZED HEALTH CARE EDUCATION/TRAINING PROGRAM

## 2022-09-12 PROCEDURE — 3074F PR MOST RECENT SYSTOLIC BLOOD PRESSURE < 130 MM HG: ICD-10-PCS | Mod: S$GLB,,, | Performed by: STUDENT IN AN ORGANIZED HEALTH CARE EDUCATION/TRAINING PROGRAM

## 2022-09-12 PROCEDURE — 1159F PR MEDICATION LIST DOCUMENTED IN MEDICAL RECORD: ICD-10-PCS | Mod: S$GLB,,, | Performed by: STUDENT IN AN ORGANIZED HEALTH CARE EDUCATION/TRAINING PROGRAM

## 2022-09-12 PROCEDURE — 3078F DIAST BP <80 MM HG: CPT | Mod: S$GLB,,, | Performed by: STUDENT IN AN ORGANIZED HEALTH CARE EDUCATION/TRAINING PROGRAM

## 2022-09-12 PROCEDURE — 1160F PR REVIEW ALL MEDS BY PRESCRIBER/CLIN PHARMACIST DOCUMENTED: ICD-10-PCS | Mod: S$GLB,,, | Performed by: STUDENT IN AN ORGANIZED HEALTH CARE EDUCATION/TRAINING PROGRAM

## 2022-09-12 NOTE — ASSESSMENT & PLAN NOTE
Started in 2006 and intermittent.  She sees cardiology. She has seen EP for ablation and has not needed this.  Has flecinide for an episode.  Also has metoprolol. Has not had any in over a year.

## 2022-09-12 NOTE — PATIENT INSTRUCTIONS

## 2022-09-12 NOTE — PROGRESS NOTES
Subjective:       Patient ID: Anthony Apple is a 61 y.o. female.    Chief Complaint: Follow-up ([facility] 9.6.22)    ER visit for migraine/viral illness/UTI treated with cipro for 7 days  HPI   Anthony Apple is a 61 y.o. female who presents complaining of headache, body aches that started 2 nights ago. She is also had nausea and vomiting the past couple days which has been worse today. History of migraine headaches. Denies any fevers, chills, cough, chest pain, shortness of breath, abdominal pain, diarrhea or dysuria. She went to urgent care this morning and they tested her for COVID and flu which was negative. She got a Toradol shot and was discharged from the urgent care. She says it did not really improve much today. She feels like she is dehydrated.. The severity of the symptoms is moderate.      Had significant fatigue, felt dehydrated.  She feels some better after her IVF  She is taking her cipro.  Having some hot flashes intermittently.  She continues with ringing in her ears and sinus pressure through the summer.  She has had antibiotics without improvement.  She is exposed to cats/dogs.    Review of Systems   Constitutional:  Positive for fatigue. Negative for activity change, appetite change, chills, fever and unexpected weight change.   HENT:  Negative for trouble swallowing.    Eyes:  Negative for visual disturbance.   Respiratory:  Negative for shortness of breath.    Cardiovascular:  Negative for chest pain and leg swelling.   Gastrointestinal:  Positive for diarrhea. Negative for abdominal pain, blood in stool, change in bowel habit and change in bowel habit.   Endocrine: Negative for cold intolerance, heat intolerance, polydipsia and polyuria.   Genitourinary:  Positive for hot flashes. Negative for dysuria and hematuria.   Musculoskeletal:  Negative for arthralgias, back pain and gait problem.   Integumentary:  Negative for rash and wound.   Neurological:  Negative for dizziness, weakness and  headaches.   Psychiatric/Behavioral:  Negative for dysphoric mood and sleep disturbance. The patient is not nervous/anxious.        Objective:      Physical Exam  Constitutional:       General: She is not in acute distress.     Appearance: Normal appearance. She is not ill-appearing.   HENT:      Right Ear: Ear canal and external ear normal. There is no impacted cerumen.      Left Ear: Ear canal and external ear normal. There is no impacted cerumen.   Eyes:      Conjunctiva/sclera: Conjunctivae normal.   Cardiovascular:      Rate and Rhythm: Normal rate and regular rhythm.      Pulses: Normal pulses.      Heart sounds: Normal heart sounds. No murmur heard.  Pulmonary:      Effort: Pulmonary effort is normal. No respiratory distress.      Breath sounds: Normal breath sounds. No wheezing.   Abdominal:      Palpations: Abdomen is soft.      Tenderness: There is no abdominal tenderness.   Musculoskeletal:      Right lower leg: No edema.      Left lower leg: No edema.   Skin:     General: Skin is warm and dry.      Findings: No rash.   Neurological:      Mental Status: She is alert. Mental status is at baseline.      Gait: Gait normal.   Psychiatric:         Mood and Affect: Mood normal.         Behavior: Behavior normal.         Thought Content: Thought content normal.         Judgment: Judgment normal.       Assessment:       1. Panic disorder    2. Hyperthyroidism    3. Gastroesophageal reflux disease without esophagitis    4. Age-related osteoporosis without current pathological fracture    5. PAF (paroxysmal atrial fibrillation)    6. Acute cystitis with hematuria    7. Hyperglycemia    8. Polydipsia    9. Hyponatremia    10. Sciatica of right side    11. Ear fullness, bilateral    12. Tinnitus, unspecified laterality        Plan:       Problem List Items Addressed This Visit          Psychiatric    Panic disorder - Primary     Fills Xanax about every 6 months.  True prn use.     reviewed and appropriate             Cardiac/Vascular    PAF (paroxysmal atrial fibrillation)     Started in 2006 and intermittent.  She sees cardiology. She has seen EP for ablation and has not needed this.  Has flecinide for an episode.  Also has metoprolol. Has not had any in over a year.             Endocrine    Hyperthyroidism     Stable and doing well  Lab Results   Component Value Date    TSH 1.207 07/27/2022                 GI    Gastroesophageal reflux disease without esophagitis     Stable on prilosec            Orthopedic    Osteoporosis     Seeing endocrinology.  Getting yearly dexa scan.  On fosamax.         Sciatica of right side    Relevant Orders    Ambulatory referral/consult to Physical/Occupational Therapy     Other Visit Diagnoses       Acute cystitis with hematuria        Relevant Orders    POCT urine dipstick without microscope (Completed)    Hyperglycemia        Relevant Orders    Hemoglobin A1C    Polydipsia        Relevant Orders    Hemoglobin A1C    Hyponatremia        Relevant Orders    Basic Metabolic Panel    Ear fullness, bilateral        Relevant Orders    Ambulatory referral/consult to ENT    Ambulatory referral/consult to Allergy    Tinnitus, unspecified laterality        Relevant Orders    Ambulatory referral/consult to ENT    Ambulatory referral/consult to Allergy

## 2022-09-14 ENCOUNTER — LAB VISIT (OUTPATIENT)
Dept: LAB | Facility: CLINIC | Age: 62
End: 2022-09-14
Payer: COMMERCIAL

## 2022-09-14 DIAGNOSIS — E87.5 HYPERKALEMIA: Primary | ICD-10-CM

## 2022-09-14 DIAGNOSIS — R63.1 POLYDIPSIA: ICD-10-CM

## 2022-09-14 DIAGNOSIS — E87.1 HYPONATREMIA: ICD-10-CM

## 2022-09-14 DIAGNOSIS — R73.9 HYPERGLYCEMIA: ICD-10-CM

## 2022-09-14 LAB
ANION GAP SERPL CALC-SCNC: 8 MMOL/L (ref 8–16)
BUN SERPL-MCNC: 14 MG/DL (ref 8–23)
CALCIUM SERPL-MCNC: 9.7 MG/DL (ref 8.7–10.5)
CHLORIDE SERPL-SCNC: 106 MMOL/L (ref 95–110)
CO2 SERPL-SCNC: 26 MMOL/L (ref 23–29)
CREAT SERPL-MCNC: 0.8 MG/DL (ref 0.5–1.4)
EST. GFR  (NO RACE VARIABLE): >60 ML/MIN/1.73 M^2
ESTIMATED AVG GLUCOSE: 97 MG/DL (ref 68–131)
GLUCOSE SERPL-MCNC: 97 MG/DL (ref 70–110)
HBA1C MFR BLD: 5 % (ref 4–5.6)
POTASSIUM SERPL-SCNC: 5.4 MMOL/L (ref 3.5–5.1)
SODIUM SERPL-SCNC: 140 MMOL/L (ref 136–145)

## 2022-09-14 PROCEDURE — 83036 HEMOGLOBIN GLYCOSYLATED A1C: CPT | Performed by: STUDENT IN AN ORGANIZED HEALTH CARE EDUCATION/TRAINING PROGRAM

## 2022-09-14 PROCEDURE — 80048 BASIC METABOLIC PNL TOTAL CA: CPT | Performed by: STUDENT IN AN ORGANIZED HEALTH CARE EDUCATION/TRAINING PROGRAM

## 2022-09-19 ENCOUNTER — LAB VISIT (OUTPATIENT)
Dept: LAB | Facility: CLINIC | Age: 62
End: 2022-09-19
Payer: COMMERCIAL

## 2022-09-19 DIAGNOSIS — E87.5 HYPERKALEMIA: ICD-10-CM

## 2022-09-19 LAB
ANION GAP SERPL CALC-SCNC: 10 MMOL/L (ref 8–16)
BUN SERPL-MCNC: 15 MG/DL (ref 8–23)
CALCIUM SERPL-MCNC: 9.2 MG/DL (ref 8.7–10.5)
CHLORIDE SERPL-SCNC: 107 MMOL/L (ref 95–110)
CO2 SERPL-SCNC: 20 MMOL/L (ref 23–29)
CREAT SERPL-MCNC: 0.8 MG/DL (ref 0.5–1.4)
EST. GFR  (NO RACE VARIABLE): >60 ML/MIN/1.73 M^2
GLUCOSE SERPL-MCNC: 93 MG/DL (ref 70–110)
POTASSIUM SERPL-SCNC: 4.1 MMOL/L (ref 3.5–5.1)
SODIUM SERPL-SCNC: 137 MMOL/L (ref 136–145)

## 2022-09-19 PROCEDURE — 80048 BASIC METABOLIC PNL TOTAL CA: CPT | Performed by: STUDENT IN AN ORGANIZED HEALTH CARE EDUCATION/TRAINING PROGRAM

## 2022-09-27 ENCOUNTER — PATIENT MESSAGE (OUTPATIENT)
Dept: FAMILY MEDICINE | Facility: CLINIC | Age: 62
End: 2022-09-27
Payer: COMMERCIAL

## 2022-10-01 ENCOUNTER — OFFICE VISIT (OUTPATIENT)
Dept: URGENT CARE | Facility: CLINIC | Age: 62
End: 2022-10-01
Payer: COMMERCIAL

## 2022-10-01 VITALS
RESPIRATION RATE: 18 BRPM | HEART RATE: 65 BPM | OXYGEN SATURATION: 99 % | BODY MASS INDEX: 25.11 KG/M2 | SYSTOLIC BLOOD PRESSURE: 129 MMHG | WEIGHT: 175 LBS | TEMPERATURE: 98 F | DIASTOLIC BLOOD PRESSURE: 69 MMHG

## 2022-10-01 DIAGNOSIS — R39.9 UTI SYMPTOMS: ICD-10-CM

## 2022-10-01 DIAGNOSIS — R30.0 DYSURIA: Primary | ICD-10-CM

## 2022-10-01 DIAGNOSIS — Z87.440 HISTORY OF RECURRENT UTI (URINARY TRACT INFECTION): ICD-10-CM

## 2022-10-01 LAB
BILIRUB UR QL STRIP: NEGATIVE
GLUCOSE UR QL STRIP: NEGATIVE
KETONES UR QL STRIP: NEGATIVE
LEUKOCYTE ESTERASE UR QL STRIP: NEGATIVE
PH, POC UA: 7
POC BLOOD, URINE: POSITIVE
POC NITRATES, URINE: NEGATIVE
PROT UR QL STRIP: NEGATIVE
SP GR UR STRIP: 1.01 (ref 1–1.03)
UROBILINOGEN UR STRIP-ACNC: ABNORMAL (ref 0.1–1.1)

## 2022-10-01 PROCEDURE — 3008F PR BODY MASS INDEX (BMI) DOCUMENTED: ICD-10-PCS | Mod: S$GLB,,, | Performed by: STUDENT IN AN ORGANIZED HEALTH CARE EDUCATION/TRAINING PROGRAM

## 2022-10-01 PROCEDURE — 99214 PR OFFICE/OUTPT VISIT, EST, LEVL IV, 30-39 MIN: ICD-10-PCS | Mod: S$GLB,,, | Performed by: STUDENT IN AN ORGANIZED HEALTH CARE EDUCATION/TRAINING PROGRAM

## 2022-10-01 PROCEDURE — 1160F RVW MEDS BY RX/DR IN RCRD: CPT | Mod: S$GLB,,, | Performed by: STUDENT IN AN ORGANIZED HEALTH CARE EDUCATION/TRAINING PROGRAM

## 2022-10-01 PROCEDURE — 3078F DIAST BP <80 MM HG: CPT | Mod: S$GLB,,, | Performed by: STUDENT IN AN ORGANIZED HEALTH CARE EDUCATION/TRAINING PROGRAM

## 2022-10-01 PROCEDURE — 3078F PR MOST RECENT DIASTOLIC BLOOD PRESSURE < 80 MM HG: ICD-10-PCS | Mod: S$GLB,,, | Performed by: STUDENT IN AN ORGANIZED HEALTH CARE EDUCATION/TRAINING PROGRAM

## 2022-10-01 PROCEDURE — 81003 POCT URINALYSIS, DIPSTICK, AUTOMATED, W/O SCOPE: ICD-10-PCS | Mod: QW,S$GLB,, | Performed by: STUDENT IN AN ORGANIZED HEALTH CARE EDUCATION/TRAINING PROGRAM

## 2022-10-01 PROCEDURE — 1159F PR MEDICATION LIST DOCUMENTED IN MEDICAL RECORD: ICD-10-PCS | Mod: S$GLB,,, | Performed by: STUDENT IN AN ORGANIZED HEALTH CARE EDUCATION/TRAINING PROGRAM

## 2022-10-01 PROCEDURE — 1159F MED LIST DOCD IN RCRD: CPT | Mod: S$GLB,,, | Performed by: STUDENT IN AN ORGANIZED HEALTH CARE EDUCATION/TRAINING PROGRAM

## 2022-10-01 PROCEDURE — 1160F PR REVIEW ALL MEDS BY PRESCRIBER/CLIN PHARMACIST DOCUMENTED: ICD-10-PCS | Mod: S$GLB,,, | Performed by: STUDENT IN AN ORGANIZED HEALTH CARE EDUCATION/TRAINING PROGRAM

## 2022-10-01 PROCEDURE — 3074F SYST BP LT 130 MM HG: CPT | Mod: S$GLB,,, | Performed by: STUDENT IN AN ORGANIZED HEALTH CARE EDUCATION/TRAINING PROGRAM

## 2022-10-01 PROCEDURE — 3044F HG A1C LEVEL LT 7.0%: CPT | Mod: S$GLB,,, | Performed by: STUDENT IN AN ORGANIZED HEALTH CARE EDUCATION/TRAINING PROGRAM

## 2022-10-01 PROCEDURE — 3074F PR MOST RECENT SYSTOLIC BLOOD PRESSURE < 130 MM HG: ICD-10-PCS | Mod: S$GLB,,, | Performed by: STUDENT IN AN ORGANIZED HEALTH CARE EDUCATION/TRAINING PROGRAM

## 2022-10-01 PROCEDURE — 99214 OFFICE O/P EST MOD 30 MIN: CPT | Mod: S$GLB,,, | Performed by: STUDENT IN AN ORGANIZED HEALTH CARE EDUCATION/TRAINING PROGRAM

## 2022-10-01 PROCEDURE — 81003 URINALYSIS AUTO W/O SCOPE: CPT | Mod: QW,S$GLB,, | Performed by: STUDENT IN AN ORGANIZED HEALTH CARE EDUCATION/TRAINING PROGRAM

## 2022-10-01 PROCEDURE — 3008F BODY MASS INDEX DOCD: CPT | Mod: S$GLB,,, | Performed by: STUDENT IN AN ORGANIZED HEALTH CARE EDUCATION/TRAINING PROGRAM

## 2022-10-01 PROCEDURE — 3044F PR MOST RECENT HEMOGLOBIN A1C LEVEL <7.0%: ICD-10-PCS | Mod: S$GLB,,, | Performed by: STUDENT IN AN ORGANIZED HEALTH CARE EDUCATION/TRAINING PROGRAM

## 2022-10-01 RX ORDER — PHENAZOPYRIDINE HYDROCHLORIDE 200 MG/1
200 TABLET, FILM COATED ORAL 3 TIMES DAILY PRN
Qty: 12 TABLET | Refills: 0 | Status: SHIPPED | OUTPATIENT
Start: 2022-10-01 | End: 2023-01-17 | Stop reason: ALTCHOICE

## 2022-10-01 NOTE — PROGRESS NOTES
Subjective:       Patient ID: Anthony Apple is a 62 y.o. female.    Vitals:  weight is 79.4 kg (175 lb). Her oral temperature is 98.1 °F (36.7 °C). Her blood pressure is 129/69 and her pulse is 65. Her respiration is 18 and oxygen saturation is 99%.     Chief Complaint: Urinary Tract Infection    Patient is a 61-year-old female with a past medical history of seasonal allergies, paroxysmal atrial fibrillation, hypothyroidism, GERD, and panic disorders who presents to clinic for evaluation of possible urinary tract infection.  Patient states approximately 3 weeks ago she was seen in the emergency department for a viral infection.  Patient states was told that she possibly could have a urinary tract infection and was prescribed Cipro.  Patient states was told by the physician not to take medications unless her symptoms become severe.  Patient states that she saw her PCP a week later and was told her urine was negative.  Patient states yesterday she developed symptoms of a possible UTI.  Patient states she took her Cipro at that time.  Patient states that she developed dysuria, urinary frequency, urinary urgency, and some left-sided flank pain.  Patient states when she took her dose of Cipro last night along with her metoprolol her heart rate dropped to the 50s.  Patient states she has taking metoprolol for some time now and her heart rate stays between 60 and 70.  Patient states concerned that this may be related to the Cipro.  Patient states she wants to be sure she has a urinary tract infection before she continues taken any medication.  Patient states that prior to this current urinary tract infection her last was this past summer.  Patient states that she was prescribed Macrobid which helped resolve symptoms and she did not have any issues with that.  Patient states it is listed as an allergy however approximately 30 years ago she had an episode of itching that lasted approximately 3 months.  Patient states the  provider at that time thought he may have been related to the Macrobid however she was on various medications and was under stress of moving.  Patient states may not have had an actual allergy to that.  Patient states would like to try this again today if she does have a urinary tract infection.  Patient states that she has not experienced any fever or chills, chest pain or palpitations, shortness of breath or cough, abdominal pain, nausea or vomiting, diarrhea, urinary retention or hesitancy, vaginal bleeding or discharge, pelvic pain, vaginal odor, back pain, headaches or dizziness.  Patient denies possibility of STI.    Urinary Tract Infection   Associated symptoms include flank pain, frequency and urgency. Pertinent negatives include no chills, nausea, vomiting or rash.   Constitution: Negative. Negative for chills, sweating, fatigue and fever.   HENT: Negative.     Neck: neck negative.   Cardiovascular: Negative.  Negative for chest pain and palpitations.   Eyes: Negative.    Respiratory: Negative.  Negative for chest tightness, cough and shortness of breath.    Gastrointestinal: Negative.  Negative for abdominal pain, nausea, vomiting and diarrhea.   Endocrine: negative.   Genitourinary:  Positive for dysuria, frequency, urgency and flank pain. Negative for urine decreased, vaginal discharge, vaginal bleeding, vaginal odor and pelvic pain.   Musculoskeletal:  Negative for muscle ache.   Skin: Negative.  Negative for color change, pale, rash and erythema.   Allergic/Immunologic: Negative.    Neurological: Negative.  Negative for dizziness, light-headedness, passing out, headaches, disorientation and altered mental status.   Hematologic/Lymphatic: Negative.    Psychiatric/Behavioral: Negative.  Negative for altered mental status, disorientation and confusion.      Objective:      Physical Exam   Constitutional: She is oriented to person, place, and time. She appears well-developed. She is cooperative.  Non-toxic  appearance. She does not appear ill. No distress.   HENT:   Head: Normocephalic and atraumatic.   Ears:   Right Ear: Hearing, tympanic membrane, external ear and ear canal normal.   Left Ear: Hearing, tympanic membrane, external ear and ear canal normal.   Nose: Nose normal. No mucosal edema, rhinorrhea or nasal deformity. No epistaxis. Right sinus exhibits no maxillary sinus tenderness and no frontal sinus tenderness. Left sinus exhibits no maxillary sinus tenderness and no frontal sinus tenderness.   Mouth/Throat: Uvula is midline, oropharynx is clear and moist and mucous membranes are normal. No trismus in the jaw. Normal dentition. No uvula swelling. No posterior oropharyngeal erythema.   Eyes: Conjunctivae and lids are normal. Pupils are equal, round, and reactive to light. Right eye exhibits no discharge. Left eye exhibits no discharge. No scleral icterus.   Neck: Trachea normal and phonation normal. Neck supple.   Cardiovascular: Normal rate, regular rhythm, normal heart sounds and normal pulses.   Pulmonary/Chest: Effort normal and breath sounds normal. No respiratory distress. She has no wheezes. She has no rhonchi. She has no rales.   Abdominal: Normal appearance and bowel sounds are normal. She exhibits no distension. Soft. There is no abdominal tenderness. There is no rebound, no guarding, no left CVA tenderness and no right CVA tenderness.   Musculoskeletal: Normal range of motion.         General: No deformity. Normal range of motion.   Neurological: She is alert and oriented to person, place, and time. She exhibits normal muscle tone. Coordination normal.   Skin: Skin is warm, dry, intact, not diaphoretic, not pale and no rash. Capillary refill takes less than 2 seconds. No erythema   Psychiatric: Her speech is normal and behavior is normal. Judgment and thought content normal.   Nursing note and vitals reviewed.      Assessment:       1. Dysuria    2. History of recurrent UTI (urinary tract  infection)    3. UTI symptoms          Plan:         Dysuria  -     POCT Urinalysis, Dipstick, Automated, W/O Scope  -     Urine culture    History of recurrent UTI (urinary tract infection)    UTI symptoms    Other orders  -     phenazopyridine (PYRIDIUM) 200 MG tablet; Take 1 tablet (200 mg total) by mouth 3 (three) times daily as needed for Pain.  Dispense: 12 tablet; Refill: 0               Labs:  UA: Positive blood, negative nitrate, negative leukocyte  Urine culture ordered will call results.  Take medications as prescribed.  Will hold off on antibiotics until receiving culture results.  Increase fluids.    Discussed decreasing caffeine and minimizing fluid intake to hours prior to sleep.  Tylenol/Motrin per package instructions for any pain or fever.    Follow-up with PCP in 1-2 days.    Based upon future culture results possibility of urology referral; patient in agreement with this plan.  Return to clinic as needed.    To ED for any new acutely worsening symptoms.    Patient in agreement with plan of care.    DISCLAIMER: Please note that my documentation in this Electronic Healthcare Record was produced using speech recognition software and therefore may contain errors related to that software system.These could include grammar, punctuation and spelling errors or the inclusion/exclusion of phrases that were not intended. Garbled syntax, mangled pronouns, and other bizarre constructions may be attributed to that software system.

## 2022-10-04 LAB
BACTERIA UR CULT: NO GROWTH
BACTERIA UR CULT: NORMAL

## 2022-10-05 ENCOUNTER — TELEPHONE (OUTPATIENT)
Dept: URGENT CARE | Facility: CLINIC | Age: 62
End: 2022-10-05
Payer: COMMERCIAL

## 2022-10-07 ENCOUNTER — CLINICAL SUPPORT (OUTPATIENT)
Dept: CARDIOLOGY | Facility: CLINIC | Age: 62
End: 2022-10-07
Payer: COMMERCIAL

## 2022-10-07 ENCOUNTER — LAB VISIT (OUTPATIENT)
Dept: LAB | Facility: HOSPITAL | Age: 62
End: 2022-10-07
Attending: INTERNAL MEDICINE
Payer: COMMERCIAL

## 2022-10-07 DIAGNOSIS — I51.9 MYXEDEMA HEART DISEASE: ICD-10-CM

## 2022-10-07 DIAGNOSIS — E89.0 POSTSURGICAL HYPOTHYROIDISM: ICD-10-CM

## 2022-10-07 DIAGNOSIS — E03.9 MYXEDEMA HEART DISEASE: ICD-10-CM

## 2022-10-07 DIAGNOSIS — Z79.899 ENCOUNTER FOR LONG-TERM (CURRENT) USE OF OTHER MEDICATIONS: Primary | ICD-10-CM

## 2022-10-07 DIAGNOSIS — E55.9 AVITAMINOSIS D: ICD-10-CM

## 2022-10-07 LAB
25(OH)D3+25(OH)D2 SERPL-MCNC: 51 NG/ML (ref 30–96)
ANION GAP SERPL CALC-SCNC: 6 MMOL/L (ref 8–16)
BUN SERPL-MCNC: 15 MG/DL (ref 8–23)
CALCIUM SERPL-MCNC: 9.5 MG/DL (ref 8.7–10.5)
CHLORIDE SERPL-SCNC: 106 MMOL/L (ref 95–110)
CO2 SERPL-SCNC: 27 MMOL/L (ref 23–29)
CREAT SERPL-MCNC: 0.7 MG/DL (ref 0.5–1.4)
EST. GFR  (NO RACE VARIABLE): >60 ML/MIN/1.73 M^2
GLUCOSE SERPL-MCNC: 96 MG/DL (ref 70–110)
POTASSIUM SERPL-SCNC: 4.4 MMOL/L (ref 3.5–5.1)
SODIUM SERPL-SCNC: 139 MMOL/L (ref 136–145)
T4 FREE SERPL-MCNC: 0.66 NG/DL (ref 0.71–1.51)
TSH SERPL DL<=0.005 MIU/L-ACNC: 1.87 UIU/ML (ref 0.34–5.6)

## 2022-10-07 PROCEDURE — 84443 ASSAY THYROID STIM HORMONE: CPT | Performed by: INTERNAL MEDICINE

## 2022-10-07 PROCEDURE — 36415 COLL VENOUS BLD VENIPUNCTURE: CPT | Performed by: INTERNAL MEDICINE

## 2022-10-07 PROCEDURE — 80048 BASIC METABOLIC PNL TOTAL CA: CPT | Performed by: INTERNAL MEDICINE

## 2022-10-07 PROCEDURE — 82306 VITAMIN D 25 HYDROXY: CPT | Performed by: INTERNAL MEDICINE

## 2022-10-07 PROCEDURE — 84439 ASSAY OF FREE THYROXINE: CPT | Performed by: INTERNAL MEDICINE

## 2022-10-07 NOTE — PROGRESS NOTES
Pt presents today for an appointment with REJI Koo. Unable to make an appointment at this time, pt declines nurse practitioner. Pt requesting to be put on a wait list.

## 2022-10-10 ENCOUNTER — HOSPITAL ENCOUNTER (OUTPATIENT)
Dept: RADIOLOGY | Facility: HOSPITAL | Age: 62
Discharge: HOME | End: 2022-10-10
Attending: INTERNAL MEDICINE

## 2022-10-10 DIAGNOSIS — M81.0 SENILE OSTEOPOROSIS: ICD-10-CM

## 2022-10-10 PROCEDURE — 77080 DXA BONE DENSITY AXIAL: CPT | Mod: TC,PO

## 2022-10-11 ENCOUNTER — CLINICAL SUPPORT (OUTPATIENT)
Dept: REHABILITATION | Facility: HOSPITAL | Age: 62
End: 2022-10-11
Attending: STUDENT IN AN ORGANIZED HEALTH CARE EDUCATION/TRAINING PROGRAM
Payer: COMMERCIAL

## 2022-10-11 DIAGNOSIS — Z74.09 IMPAIRED FUNCTIONAL MOBILITY AND ACTIVITY TOLERANCE: Primary | ICD-10-CM

## 2022-10-11 DIAGNOSIS — M54.31 SCIATICA OF RIGHT SIDE: ICD-10-CM

## 2022-10-11 PROCEDURE — 97162 PT EVAL MOD COMPLEX 30 MIN: CPT | Mod: PN

## 2022-10-11 NOTE — PLAN OF CARE
[facility]   Physical Therapy Initial Evaluation     Date: 10/11/2022   Name: Anthony Apple  Clinic Number: [id number]    Therapy Diagnosis:   Encounter Diagnoses   Name Primary?    Impaired functional mobility and activity tolerance Yes    Sciatica of right side      Physician: Cornelia Burns MD    Physician Orders: PT Eval and Treat Hip, Back  Medical Diagnosis from Referral: Sciatica of right side   Evaluation Date: 10/11/2022  Authorization Period Expiration: 09/12/2023   Plan of Care Expiration: 11/23/2022  Progress Note Due: 11/23/2022  Visit # / Visits authorized: 1/ 1   FOTO: Next survey due week of 10/24/2022    Precautions: Standard     Time In: 0853  Time Out: 0942  Total Appointment Time (timed & untimed codes): 44 minutes      SUBJECTIVE     Date of onset: chronic, 6/2022    History of current condition - Anthony reports: she has had chronic pain in R hip/buttock pain extending down R LE for years.  Then she started to increase her activity level/walking speed this summer at which time she developed worsening of R LE pain.  She now has intermittent pain and paresthesias in R LE.  She went to MD who has now referred her to PT.      Falls: No    Imaging, none: recently    Prior Therapy: Yes for similar issues in 2019  Social History:  lives with their spouse and adult son in 1 story home without steps.    Occupation:   Prior Level of Function: Independent in self care activities, walking, yard work, house work, cooking  Current Level of Function: increased pain with walking, increased pain with prolonged standing particularly with weight shifting to R LE.  Sleep intermittently disturbed     Pain:  Current 3/10, worst 5/10, best 2/10   Location: right buttock, lateral hip, lateral aspect of R thigh, knee, lateral aspect of R lower leg stopping at ankle  Description: constant aching and tingling.  As the day wears on it becomes more tired and aching.    Aggravating  Factors: walking fast, laying on R side, standing with weight shifted to R. Prolonged sitting, car transfers difficult.    Easing Factors: Tylenol, positioning.      Patients goals: To not be in as much pain.      Medical History:   Past Medical History:   Diagnosis Date    Atrial fibrillation     GERD (gastroesophageal reflux disease)     Hyperthyroidism     Osteoporosis     Panic attack     Seasonal allergies        Surgical History:   Anthony Apple  has a past surgical history that includes none.    Medications:   Anthony has a current medication list which includes the following prescription(s): alendronate, alprazolam, ammonium lactate, aspirin, azelastine, chloraseptic max, bepreve, caltrate 600 plus d, diphenhydramine, flecainide, fluticasone propionate, metoprolol succinate, montelukast, omeprazole, phenazopyridine, and triamcinolone acetonide 0.5%.    Allergies:   Review of patient's allergies indicates:   Allergen Reactions    Cefdinir Anaphylaxis, Rash and Shortness Of Breath    Nitrofurantoin macrocrystal Itching    Clindamycin Itching    Sulfamethoxazole-trimethoprim Itching, Other (See Comments) and Rash          OBJECTIVE     Posture: Standing:  R pelvis higher, Shoulders level, minimal rounded shoulder posture; Sitting: minimally reversed lumbar lordosis, increased thoracic kyphosis, moderate rounded shoulder and forward head posture.    Palpation: No significant tenderness or excess muscle tension detected in lumbar spine.  Minimal tenderness to deep pressure R gluteal muscle.    Sensation: Patient reports intermittent paresthesias R lateral knee extending down R lateral lower leg.    DTRs: +2 and symmetrical B Knee jerk and Ankle jerk.    Range of Motion/Strength:   Thoracic/Lumbar AROM: Pain/Dysfunction with Movement:   Flexion                       105* - 40* = 65*    Extension                  25* - 10* = 15*    Right side bending    30*    Left side bending      35*    Right rotation             60%    Left rotation              60%    B LE ROM grossly WNL,   Strength: R hip flexion 4/5.  Otherwise B LE grossly 4+/5    Core strength limited.    Flexibility: Hamstring length R 145*, L 140*; Piriformis moderate tightness B; minimal to moderate hip flexor tightness; moderate gastroc tightness.    Gait: Without AD  Analysis: No deficits noted this date.  Patient reports increased pain with increased eduar.    Bed Mobility:Independent  Transfers: Independent  Special Tests: Axial compression (-), distraction (-); Supine SLR (-) B  Other: N/A     Limitation/Restriction for FOTO Lower Leg (w/o Knee) Survey    Therapist reviewed FOTO scores for Anthony Apple on 10/11/2022.   FOTO documents entered into [employer] - see Media section.    Limitation Score: 34%         TREATMENT     Total Treatment time (time-based codes) separate from Evaluation: 0 minutes      Treatment deferred this date    PATIENT EDUCATION AND HOME EXERCISES     Education provided:   - Discussed role of PT and proposed POC.      Written Home Exercises Provided:  To be issued during subsequent visits.   Exercises were reviewed and Anthony was able to demonstrate them prior to the end of the session.  Anthony demonstrated good  understanding of the education provided. See EMR under Patient Instructions for exercises provided during therapy sessions.    ASSESSMENT     Anthony is a 62 y.o. female referred to outpatient Physical Therapy with a medical diagnosis of Sciatica of right side. Patient presents with consistent R LE pain/paresthesias, impaired posture, decreased trunk/lumbar ROM, impaired core strength, decreased LE flexibility.  These problems contribute to difficulty with prolonged standing (particularly with increased weight bearing on R), walking with increased eduar, laying on R side, sleep, car transfers.  She should benefit from skilled PT services to address these problems in order to minimize functional deficit and to maximize activity  tolerance in order to allow for improved participation in fitness activities.      Patient prognosis is Good.   Patient will benefit from skilled outpatient Physical Therapy to address the deficits stated above and in the chart below, provide patient /family education, and to maximize patientt's level of independence.     Plan of care discussed with patient: Yes  Patient's spiritual, cultural and educational needs considered and patient is agreeable to the plan of care and goals as stated below:     Anticipated Barriers for therapy: None    Medical Necessity is demonstrated by the following  History  Co-morbidities and personal factors that may impact the plan of care Co-morbidities:   difficulty sleeping and a-fib, osteoporosis  History of panic attacks.    Personal Factors:   no deficits     moderate   Examination  Body Structures and Functions, activity limitations and participation restrictions that may impact the plan of care Body Regions:   back  lower extremities    Body Systems:    ROM  strength  gait  transfers    Participation Restrictions:   None anticipated    Activity limitations:   Learning and applying knowledge  no deficits    General Tasks and Commands  no deficits    Communication  no deficits    Mobility  lifting and carrying objects  walking  using transportation (bus, train, plane, car)    Self care  no deficits    Domestic Life  cooking  doing house work (cleaning house, washing dishes, laundry)    Interactions/Relationships  no deficits    Life Areas  no deficits    Community and Social Life  community life  recreation and leisure         high   Clinical Presentation evolving clinical presentation with changing clinical characteristics moderate   Decision Making/ Complexity Score: moderate     Goals:  Short Term Goals: 3 weeks    1) Decrease frequency of symptoms to intermittent  2) Facilitate improved LE flexibility  3) Facilitate improved posture  4) Patient will stand with equal weight  bearing without increased LE symptoms    Long Term Goals: 6 weeks   1) Patient will walk @ increased eduar for her fitness activities without increased LE symptoms  2) Patient will sleep at least 6 hours uninterrupted by pain 6 of 7 days per week  3) Patient will consistently perform car transfers without increased pain  4) Improve functional ability to < 20% as indicated by FOTO Lower Leg (w/o Knee) survey  5) Patient will be independent in complete HEP    PLAN   Plan of care Certification: 10/11/2022 to 11/23/2022.    Outpatient Physical Therapy 2 times weekly for 6 weeks to include the following interventions: Electrical stimulation (IFES), Therapeutic Exercise, Patient Education, Manual Therapy, Therapeutic Activities, Therapeutic Taping.    Desi Stubbs, PT      I CERTIFY THE NEED FOR THESE SERVICES FURNISHED UNDER THIS PLAN OF TREATMENT AND WHILE UNDER MY CARE   Physician's comments:     Physician's Signature: ___________________________________________________

## 2022-10-13 ENCOUNTER — CLINICAL SUPPORT (OUTPATIENT)
Dept: REHABILITATION | Facility: HOSPITAL | Age: 62
End: 2022-10-13
Attending: STUDENT IN AN ORGANIZED HEALTH CARE EDUCATION/TRAINING PROGRAM
Payer: COMMERCIAL

## 2022-10-13 DIAGNOSIS — Z74.09 IMPAIRED FUNCTIONAL MOBILITY AND ACTIVITY TOLERANCE: Primary | ICD-10-CM

## 2022-10-13 DIAGNOSIS — M54.31 SCIATICA OF RIGHT SIDE: ICD-10-CM

## 2022-10-13 PROCEDURE — 97110 THERAPEUTIC EXERCISES: CPT | Mod: PN,CQ

## 2022-10-13 PROCEDURE — 97140 MANUAL THERAPY 1/> REGIONS: CPT | Mod: PN,CQ

## 2022-10-13 NOTE — PROGRESS NOTES
"[facility]   Physical Therapy Treatment Note     Name: Anthony Apple  Clinic Number: [id number]    Therapy Diagnosis:   Encounter Diagnoses   Name Primary?    Impaired functional mobility and activity tolerance Yes    Sciatica of right side      Physician: Cornelia Burns MD    Visit Date: 10/13/2022    Physician Orders: PT Eval and Treat Hip, Back  Medical Diagnosis from Referral: Sciatica of right side   Evaluation Date: 10/11/2022  Authorization Period Expiration: 09/12/2023   Plan of Care Expiration: 11/23/2022  Progress Note Due: 11/23/2022  Visit # / Visits authorized: 1/ 20 (2 total)   FOTO: Next survey due week of 10/24/2022     Precautions: Standard     PTA Visit #: 1/5     Time In: 0801  Time Out: 0841  Total Billable Time: 40 minutes    SUBJECTIVE     Pt reports: "I took tylenol before I came. I have been having migraines lately, and almost did not come today. I ate something, and it got a little better."  She was not compliant with home exercise program because one has not been issued at this time.  Response to previous treatment: No change since initial evaluation  Functional change: N/A at this time    Pain: 2/10  Location: left back  and buttocks      OBJECTIVE     Objective Measures updated at progress report unless specified.     Treatment     Anthony received the treatments listed below:      therapeutic exercises to develop strength, endurance, ROM, flexibility, posture, and core stabilization for 32 minutes including:  Standing gastroc stretch 3x30s  Seated hamstring stretch 3x30s  Hooklying lower trunk rotation 10x3s ea   Piriformis stretch 3x30s (felt catching feeling in lower back while attempting sitting)   SIJ technique 5x3s ea   Posterior pelvic tilt 20x3s ea   Core stabilization march with PPT 10x3s ea    manual therapy techniques: Soft tissue Mobilization were applied to the: R piriformis and gluteal region for 8 minutes, including:  Soft tissue mobilization " including strumming and triggerpoint release    Patient Education and Home Exercises     Home Exercises Provided and Patient Education Provided     Education provided:   - The patient was instructed in initial therapeutic exercise program, as well as reviewing proper bed transfers.    Written Home Exercises Provided:  The patient is to be issued initial home exercise program in subsequent sessions . Exercises were reviewed and Anthony was able to demonstrate them prior to the end of the session.  Anthony demonstrated good  understanding of the education provided. See EMR under Patient Instructions for exercises provided during therapy sessions    ASSESSMENT     The patient tolerated today's treatment session. Piriformis stretches had to be modified to supine instead of sitting due to increased discomfort in lower back/gluteal region. Patient was instructed in initial therapeutic exercise program listed above. The patient demonstrates minimal to moderate size triggerpoints noted in/near piriformis and gluteal muscles. Zechariah was provided manual techniques towards the end of treatment in order to promote soft tissue mobility and reduce noted triggerpoints.    Anthony Is progressing well towards her goals.   Pt prognosis is Good.     Pt will continue to benefit from skilled outpatient physical therapy to address the deficits listed in the problem list box on initial evaluation, provide pt/family education and to maximize pt's level of independence in the home and community environment.     Pt's spiritual, cultural and educational needs considered and pt agreeable to plan of care and goals.     Anticipated barriers to physical therapy: None    Goals:   Short Term Goals: 3 weeks    1) Decrease frequency of symptoms to intermittent Ongoing  2) Facilitate improved LE flexibility Initiated  3) Facilitate improved posture Ongoing  4) Patient will stand with equal weight bearing without increased LE symptoms Ongoing      Long Term  Goals: 6 weeks   1) Patient will walk @ increased eduar for her fitness activities without increased LE symptoms Ongoing  2) Patient will sleep at least 6 hours uninterrupted by pain 6 of 7 days per week Ongoing   3) Patient will consistently perform car transfer without increased pain Ongoing  4) Improve functional ability to < 20% as indicated by FOTO Lower Leg (w/o Knee) survey Ongoing  5) Patient will be independent in complete HEP Ongoing    PLAN     The patient is to be progressed within the established plan of care as tolerated in order to accomplish goals stated above. Plan to add recumbent bike and supine hip flexor stretch as tolerated, as well as provided home exercise program in subsequent session.    Catrachita Cornell, PTA

## 2022-10-24 ENCOUNTER — OFFICE VISIT (OUTPATIENT)
Dept: FAMILY MEDICINE | Facility: CLINIC | Age: 62
End: 2022-10-24
Payer: COMMERCIAL

## 2022-10-24 VITALS
TEMPERATURE: 98 F | OXYGEN SATURATION: 97 % | WEIGHT: 175.5 LBS | RESPIRATION RATE: 18 BRPM | HEIGHT: 70 IN | DIASTOLIC BLOOD PRESSURE: 78 MMHG | SYSTOLIC BLOOD PRESSURE: 122 MMHG | HEART RATE: 96 BPM | BODY MASS INDEX: 25.13 KG/M2

## 2022-10-24 DIAGNOSIS — I48.0 PAF (PAROXYSMAL ATRIAL FIBRILLATION): ICD-10-CM

## 2022-10-24 DIAGNOSIS — R09.89 UPPER RESPIRATORY SYMPTOM: Primary | ICD-10-CM

## 2022-10-24 DIAGNOSIS — J06.0 SORE THROAT AND LARYNGITIS: ICD-10-CM

## 2022-10-24 DIAGNOSIS — B97.89 VIRAL LARYNGITIS: ICD-10-CM

## 2022-10-24 DIAGNOSIS — J04.0 VIRAL LARYNGITIS: ICD-10-CM

## 2022-10-24 LAB
CTP QC/QA: YES
MOLECULAR STREP A: NEGATIVE
POC MOLECULAR INFLUENZA A AGN: NEGATIVE
POC MOLECULAR INFLUENZA B AGN: NEGATIVE
SARS-COV-2 RDRP RESP QL NAA+PROBE: NEGATIVE

## 2022-10-24 PROCEDURE — 87651 POCT STREP A MOLECULAR: ICD-10-PCS | Mod: QW,S$GLB,, | Performed by: NURSE PRACTITIONER

## 2022-10-24 PROCEDURE — 3044F PR MOST RECENT HEMOGLOBIN A1C LEVEL <7.0%: ICD-10-PCS | Mod: S$GLB,,, | Performed by: NURSE PRACTITIONER

## 2022-10-24 PROCEDURE — 87502 INFLUENZA DNA AMP PROBE: CPT | Mod: QW,S$GLB,, | Performed by: NURSE PRACTITIONER

## 2022-10-24 PROCEDURE — 3074F PR MOST RECENT SYSTOLIC BLOOD PRESSURE < 130 MM HG: ICD-10-PCS | Mod: S$GLB,,, | Performed by: NURSE PRACTITIONER

## 2022-10-24 PROCEDURE — 99213 PR OFFICE/OUTPT VISIT, EST, LEVL III, 20-29 MIN: ICD-10-PCS | Mod: S$GLB,,, | Performed by: NURSE PRACTITIONER

## 2022-10-24 PROCEDURE — 87635: ICD-10-PCS | Mod: QW,S$GLB,, | Performed by: NURSE PRACTITIONER

## 2022-10-24 PROCEDURE — 99213 OFFICE O/P EST LOW 20 MIN: CPT | Mod: S$GLB,,, | Performed by: NURSE PRACTITIONER

## 2022-10-24 PROCEDURE — 99999 PR PBB SHADOW E&M-EST. PATIENT-LVL V: CPT | Mod: PBBFAC,,, | Performed by: NURSE PRACTITIONER

## 2022-10-24 PROCEDURE — 1159F PR MEDICATION LIST DOCUMENTED IN MEDICAL RECORD: ICD-10-PCS | Mod: S$GLB,,, | Performed by: NURSE PRACTITIONER

## 2022-10-24 PROCEDURE — 1160F RVW MEDS BY RX/DR IN RCRD: CPT | Mod: S$GLB,,, | Performed by: NURSE PRACTITIONER

## 2022-10-24 PROCEDURE — 3078F DIAST BP <80 MM HG: CPT | Mod: S$GLB,,, | Performed by: NURSE PRACTITIONER

## 2022-10-24 PROCEDURE — 3044F HG A1C LEVEL LT 7.0%: CPT | Mod: S$GLB,,, | Performed by: NURSE PRACTITIONER

## 2022-10-24 PROCEDURE — 87635 SARS-COV-2 COVID-19 AMP PRB: CPT | Mod: QW,S$GLB,, | Performed by: NURSE PRACTITIONER

## 2022-10-24 PROCEDURE — 3074F SYST BP LT 130 MM HG: CPT | Mod: S$GLB,,, | Performed by: NURSE PRACTITIONER

## 2022-10-24 PROCEDURE — 87502 POCT INFLUENZA A/B MOLECULAR: ICD-10-PCS | Mod: QW,S$GLB,, | Performed by: NURSE PRACTITIONER

## 2022-10-24 PROCEDURE — 3078F PR MOST RECENT DIASTOLIC BLOOD PRESSURE < 80 MM HG: ICD-10-PCS | Mod: S$GLB,,, | Performed by: NURSE PRACTITIONER

## 2022-10-24 PROCEDURE — 87651 STREP A DNA AMP PROBE: CPT | Mod: QW,S$GLB,, | Performed by: NURSE PRACTITIONER

## 2022-10-24 PROCEDURE — 1159F MED LIST DOCD IN RCRD: CPT | Mod: S$GLB,,, | Performed by: NURSE PRACTITIONER

## 2022-10-24 PROCEDURE — 1160F PR REVIEW ALL MEDS BY PRESCRIBER/CLIN PHARMACIST DOCUMENTED: ICD-10-PCS | Mod: S$GLB,,, | Performed by: NURSE PRACTITIONER

## 2022-10-24 PROCEDURE — 99999 PR PBB SHADOW E&M-EST. PATIENT-LVL V: ICD-10-PCS | Mod: PBBFAC,,, | Performed by: NURSE PRACTITIONER

## 2022-10-24 RX ORDER — METHYLPREDNISOLONE 4 MG/1
TABLET ORAL
Qty: 21 EACH | Refills: 0 | Status: SHIPPED | OUTPATIENT
Start: 2022-10-24 | End: 2022-11-14

## 2022-10-24 RX ORDER — METOPROLOL SUCCINATE 25 MG/1
12.5 TABLET, EXTENDED RELEASE ORAL 2 TIMES DAILY
Qty: 30 TABLET | Refills: 0 | Status: SHIPPED | OUTPATIENT
Start: 2022-10-24 | End: 2022-11-03 | Stop reason: SDUPTHER

## 2022-10-24 NOTE — PROGRESS NOTES
Subjective:       Patient ID: Anthony Apple is a 62 y.o. female.    Chief Complaint: Nasal Congestion, Hoarse (/), Cough, Generalized Body Aches, Chills, and Sore Throat    Anthony Apple presents to the clinic today for URI symptoms  Reports: Nasal congestion, sinus pressure/drainage, sore throat with hoarse voice, cough, generalized body aches. Denies Fever  Reports  had similar symptoms last week.   Has chronic allergies- has been taking Singulair, Robitussin every 4-6 hours for cough and performing saline nasal flushes- reports son is staying with them and has cat/dog exposure despite attempting to place them in their own room etc.     Review of Systems   Constitutional:  Positive for chills. Negative for fatigue and fever.   HENT:  Positive for congestion, sinus pressure, sore throat and voice change.    Respiratory:  Positive for cough.    Musculoskeletal:  Positive for myalgias.   Allergic/Immunologic: Positive for environmental allergies.   Neurological:  Negative for dizziness, weakness and headaches.     Patient Active Problem List   Diagnosis    PAF (paroxysmal atrial fibrillation)    Mitral valve prolapse    Family history of colon cancer    Gastroesophageal reflux disease without esophagitis    Hyperthyroidism    Osteoporosis    Seasonal allergies    Panic disorder    Sciatica of right side    Paroxysmal atrial fibrillation    COVID-19    Chondral defect of right patella    Situational depression    Impaired functional mobility and activity tolerance       Objective:      Physical Exam  Constitutional:       General: She is not in acute distress.     Appearance: Normal appearance.   HENT:      Right Ear: Tympanic membrane is bulging.      Left Ear: Tympanic membrane normal.      Mouth/Throat:      Lips: Pink.      Pharynx: Posterior oropharyngeal erythema present. No pharyngeal swelling.      Tonsils: No tonsillar exudate.   Eyes:      Conjunctiva/sclera: Conjunctivae normal.   Cardiovascular:  "     Rate and Rhythm: Normal rate and regular rhythm.      Heart sounds: Normal heart sounds. No murmur heard.  Pulmonary:      Effort: Pulmonary effort is normal. No respiratory distress.      Breath sounds: Normal breath sounds.   Lymphadenopathy:      Cervical: Cervical adenopathy present.   Skin:     General: Skin is warm and dry.      Findings: No rash.   Neurological:      General: No focal deficit present.      Mental Status: She is alert and oriented to person, place, and time.   Psychiatric:         Mood and Affect: Mood normal.         Behavior: Behavior normal.       Lab Results   Component Value Date    WBC 4.67 12/20/2021    HGB 13.0 12/20/2021    HCT 40.6 12/20/2021     12/20/2021    CHOL 164 12/20/2021    TRIG 50 12/20/2021    HDL 55 12/20/2021    ALT 20 12/20/2021    AST 25 12/20/2021     10/07/2022    K 4.4 10/07/2022     10/07/2022    CREATININE 0.7 10/07/2022    BUN 15 10/07/2022    CO2 27 10/07/2022    TSH 1.870 10/07/2022    HGBA1C 5.0 09/14/2022     The 10-year ASCVD risk score (Endy THOMPSON, et al., 2019) is: 3.2%    Values used to calculate the score:      Age: 62 years      Sex: Female      Is Non- : No      Diabetic: No      Tobacco smoker: No      Systolic Blood Pressure: 122 mmHg      Is BP treated: No      HDL Cholesterol: 55 mg/dL      Total Cholesterol: 164 mg/dL  Visit Vitals  /78 (BP Location: Right arm, Patient Position: Sitting, BP Method: Large (Manual))   Pulse 96   Temp 98.2 °F (36.8 °C) (Oral)   Resp 18   Ht 5' 10" (1.778 m)   Wt 79.6 kg (175 lb 7.8 oz)   SpO2 97%   BMI 25.18 kg/m²      Assessment:       1. Upper respiratory symptom    2. PAF (paroxysmal atrial fibrillation)    3. Sore throat and laryngitis    4. Viral laryngitis          Plan:       1. Upper respiratory symptom  -     POCT Influenza A/B Molecular  -     POCT COVID-19 Rapid Screening; Future; Expected date: 10/24/2022  COVID/INFLUENZA negative     2. PAF (paroxysmal " atrial fibrillation)  -     metoprolol succinate (TOPROL-XL) 25 MG 24 hr tablet; Take 0.5 tablets (12.5 mg total) by mouth 2 (two) times daily.  Dispense: 30 tablet; Refill: 0    3. Sore throat and laryngitis  -     methylPREDNISolone (MEDROL DOSEPACK) 4 mg tablet; use as directed  Dispense: 21 each; Refill: 0  -     POCT Strep A, Molecular  Strep negative   4. Viral laryngitis   Warm salt water gargles/throat lozenges  Warm liquids  Continue saline nasal flushes     No follow-ups on file.      Future Appointments       Date Provider Specialty Appt Notes    10/25/2022 Cornelia Burns MD Family Medicine knot on foot    10/27/2022 Desi Stubbs, PT [facility]    10/31/2022 Desi Stubbs, PT Outpatient [facility]    11/3/2022 Catrachita Cornell, ALEX Outpatient Re[facility]    12/12/2022 Cornelia Burns MD Family Medicine 3 month    12/15/2022 Velia Taylor NP Family Medicine 6 month f/u

## 2022-10-25 ENCOUNTER — PATIENT MESSAGE (OUTPATIENT)
Dept: CARDIOLOGY | Facility: CLINIC | Age: 62
End: 2022-10-25
Payer: COMMERCIAL

## 2022-10-27 ENCOUNTER — PATIENT MESSAGE (OUTPATIENT)
Dept: CARDIOLOGY | Facility: CLINIC | Age: 62
End: 2022-10-27
Payer: COMMERCIAL

## 2022-11-03 ENCOUNTER — OFFICE VISIT (OUTPATIENT)
Dept: CARDIOLOGY | Facility: CLINIC | Age: 62
End: 2022-11-03
Payer: COMMERCIAL

## 2022-11-03 VITALS
DIASTOLIC BLOOD PRESSURE: 70 MMHG | RESPIRATION RATE: 16 BRPM | HEART RATE: 69 BPM | SYSTOLIC BLOOD PRESSURE: 110 MMHG | HEIGHT: 70 IN | WEIGHT: 176 LBS | BODY MASS INDEX: 25.2 KG/M2 | OXYGEN SATURATION: 98 %

## 2022-11-03 DIAGNOSIS — E05.90 HYPERTHYROIDISM: ICD-10-CM

## 2022-11-03 DIAGNOSIS — I34.1 MITRAL VALVE PROLAPSE: ICD-10-CM

## 2022-11-03 DIAGNOSIS — J40 TRACHEOBRONCHITIS: ICD-10-CM

## 2022-11-03 DIAGNOSIS — I48.0 PAF (PAROXYSMAL ATRIAL FIBRILLATION): ICD-10-CM

## 2022-11-03 DIAGNOSIS — K21.9 GASTROESOPHAGEAL REFLUX DISEASE WITHOUT ESOPHAGITIS: ICD-10-CM

## 2022-11-03 PROCEDURE — 3044F PR MOST RECENT HEMOGLOBIN A1C LEVEL <7.0%: ICD-10-PCS | Mod: CPTII,S$GLB,, | Performed by: INTERNAL MEDICINE

## 2022-11-03 PROCEDURE — 3008F PR BODY MASS INDEX (BMI) DOCUMENTED: ICD-10-PCS | Mod: CPTII,S$GLB,, | Performed by: INTERNAL MEDICINE

## 2022-11-03 PROCEDURE — 1160F RVW MEDS BY RX/DR IN RCRD: CPT | Mod: CPTII,S$GLB,, | Performed by: INTERNAL MEDICINE

## 2022-11-03 PROCEDURE — 3074F PR MOST RECENT SYSTOLIC BLOOD PRESSURE < 130 MM HG: ICD-10-PCS | Mod: CPTII,S$GLB,, | Performed by: INTERNAL MEDICINE

## 2022-11-03 PROCEDURE — 3074F SYST BP LT 130 MM HG: CPT | Mod: CPTII,S$GLB,, | Performed by: INTERNAL MEDICINE

## 2022-11-03 PROCEDURE — 99214 PR OFFICE/OUTPT VISIT, EST, LEVL IV, 30-39 MIN: ICD-10-PCS | Mod: S$GLB,,, | Performed by: INTERNAL MEDICINE

## 2022-11-03 PROCEDURE — 3078F PR MOST RECENT DIASTOLIC BLOOD PRESSURE < 80 MM HG: ICD-10-PCS | Mod: CPTII,S$GLB,, | Performed by: INTERNAL MEDICINE

## 2022-11-03 PROCEDURE — 99214 OFFICE O/P EST MOD 30 MIN: CPT | Mod: S$GLB,,, | Performed by: INTERNAL MEDICINE

## 2022-11-03 PROCEDURE — 1159F PR MEDICATION LIST DOCUMENTED IN MEDICAL RECORD: ICD-10-PCS | Mod: CPTII,S$GLB,, | Performed by: INTERNAL MEDICINE

## 2022-11-03 PROCEDURE — 3008F BODY MASS INDEX DOCD: CPT | Mod: CPTII,S$GLB,, | Performed by: INTERNAL MEDICINE

## 2022-11-03 PROCEDURE — 1159F MED LIST DOCD IN RCRD: CPT | Mod: CPTII,S$GLB,, | Performed by: INTERNAL MEDICINE

## 2022-11-03 PROCEDURE — 99999 PR PBB SHADOW E&M-EST. PATIENT-LVL IV: CPT | Mod: PBBFAC,,, | Performed by: INTERNAL MEDICINE

## 2022-11-03 PROCEDURE — 1160F PR REVIEW ALL MEDS BY PRESCRIBER/CLIN PHARMACIST DOCUMENTED: ICD-10-PCS | Mod: CPTII,S$GLB,, | Performed by: INTERNAL MEDICINE

## 2022-11-03 PROCEDURE — 99999 PR PBB SHADOW E&M-EST. PATIENT-LVL IV: ICD-10-PCS | Mod: PBBFAC,,, | Performed by: INTERNAL MEDICINE

## 2022-11-03 PROCEDURE — 3078F DIAST BP <80 MM HG: CPT | Mod: CPTII,S$GLB,, | Performed by: INTERNAL MEDICINE

## 2022-11-03 PROCEDURE — 3044F HG A1C LEVEL LT 7.0%: CPT | Mod: CPTII,S$GLB,, | Performed by: INTERNAL MEDICINE

## 2022-11-03 RX ORDER — METOPROLOL SUCCINATE 25 MG/1
12.5 TABLET, EXTENDED RELEASE ORAL 2 TIMES DAILY
Qty: 90 TABLET | Refills: 3 | Status: SHIPPED | OUTPATIENT
Start: 2022-11-03 | End: 2024-01-11 | Stop reason: SDUPTHER

## 2022-11-03 RX ORDER — FLECAINIDE ACETATE 100 MG/1
100 TABLET ORAL DAILY
Qty: 30 TABLET | Refills: 11 | Status: SHIPPED | OUTPATIENT
Start: 2022-11-03 | End: 2024-01-11 | Stop reason: SDUPTHER

## 2022-11-03 NOTE — ASSESSMENT & PLAN NOTE
Continue on present therapy this should include metoprolol are succinate 25 mg half a tablet she is taking quite a tablet twice daily recommend increase this to 12.5 mg twice daily for few days and then 25 mg once a day.  Continue on flecainide ablet at 100 mg daily.  Continue on magnesium supplements as well.  Because of low chads score she is maintaining on aspirin alone continue this

## 2022-11-03 NOTE — ASSESSMENT & PLAN NOTE
This appears to be improving.  Advised her to avoid using DayQuil NyQuil and other over-the-counter are antihistamines except Zyrtec or Allegra.  And she can use regular potassium cough set up

## 2022-11-03 NOTE — PROGRESS NOTES
Subjective:    Patient ID:  Anthony Apple is a 62 y.o. female patient here for evaluation Atrial Fibrillation      History of Present Illness:     Patient is a 62-year-old lady with history of paroxysmal atrial fibrillation developed upper respiratory infection with cough and congestion about 3 weeks ago.  She has seen a urgent care center [facility], COVID and step arm screens were all negative.  And she was advised to take some steroids are Medrol Dosepak although she did not started.  She is being to feel better she does have some residual cough and congestion still persisting.  But no fevers or chills no nausea vomiting   She did have improvement in for flutters while taking these are.  Magnesium at 250 mg seem to have helped       Review of patient's allergies indicates:   Allergen Reactions    Cefdinir Anaphylaxis, Rash and Shortness Of Breath    Nitrofurantoin macrocrystal Itching    Clindamycin Itching    Sulfamethoxazole-trimethoprim Itching, Other (See Comments) and Rash       Past Medical History:   Diagnosis Date    Atrial fibrillation     GERD (gastroesophageal reflux disease)     Hyperthyroidism     Osteoporosis     Panic attack     Seasonal allergies      Past Surgical History:   Procedure Laterality Date    none       Social History     Tobacco Use    Smoking status: Never    Smokeless tobacco: Never   Substance Use Topics    Alcohol use: Yes     Alcohol/week: 2.0 standard drinks     Types: 1 Glasses of wine, 1 Cans of beer per week     Comment: on occasion     Drug use: Not Currently        Review of Systems:    As noted in HPI in addition      REVIEW OF SYSTEMS  CARDIOVASCULAR: No recent chest pain, palpitations, arm, neck, or jaw pain palpitations have improved.  RESPIRATORY: No recent fever, c cough and congestion for the past 3 weeks as described above.     : No blood in the urine  GI: No Nausea, vomiting, constipation, diarrhea, blood, or reflux.  MUSCULOSKELETAL: No myalgias  NEURO: No  lightheadedness or dizziness  EYES: No Double vision, blurry, vision or headache              Objective        Vitals:    11/03/22 1556   BP: 110/70   Pulse: 69   Resp: 16       LIPIDS - LAST 2   Lab Results   Component Value Date    CHOL 164 12/20/2021    HDL 55 12/20/2021    LDLCALC 99.0 12/20/2021    TRIG 50 12/20/2021    CHOLHDL 33.5 12/20/2021       CBC - LAST 2  Lab Results   Component Value Date    WBC 4.67 12/20/2021    RBC 4.22 12/20/2021    HGB 13.0 12/20/2021    HCT 40.6 12/20/2021    MCV 96 12/20/2021    MCH 30.8 12/20/2021    MCHC 32.0 12/20/2021    RDW 12.4 12/20/2021     12/20/2021    MPV 10.5 12/20/2021    GRAN 2.6 12/20/2021    GRAN 55.3 12/20/2021    LYMPH 1.6 12/20/2021    LYMPH 34.7 12/20/2021    MONO 0.3 12/20/2021    MONO 7.3 12/20/2021    BASO 0.02 12/20/2021    NRBC 0 12/20/2021       CHEMISTRY & LIVER FUNCTION - LAST 2  Lab Results   Component Value Date     10/07/2022     09/19/2022    K 4.4 10/07/2022    K 4.1 09/19/2022     10/07/2022     09/19/2022    CO2 27 10/07/2022    CO2 20 (L) 09/19/2022    ANIONGAP 6 (L) 10/07/2022    ANIONGAP 10 09/19/2022    BUN 15 10/07/2022    BUN 15 09/19/2022    CREATININE 0.7 10/07/2022    CREATININE 0.8 09/19/2022    GLU 96 10/07/2022    GLU 93 09/19/2022    CALCIUM 9.5 10/07/2022    CALCIUM 9.2 09/19/2022    MG 2.2 09/20/2021    ALBUMIN 4.3 12/20/2021    PROT 7.5 12/20/2021    ALKPHOS 56 12/20/2021    ALT 20 12/20/2021    AST 25 12/20/2021    BILITOT 0.8 12/20/2021        CARDIAC PROFILE - LAST 2  No results found for: BNP, CPK, CPKMB, LDH, TROPONINI     COAGULATION - LAST 2  No results found for: LABPT, INR, APTT    ENDOCRINE & PSA - LAST 2  Lab Results   Component Value Date    HGBA1C 5.0 09/14/2022    TSH 1.870 10/07/2022    TSH 1.207 07/27/2022        ECHOCARDIOGRAM RESULTS  Results for orders placed during the hospital encounter of 03/18/22    Echo    Interpretation Summary  · The left ventricle is normal in size with  concentric remodeling and normal systolic function.  · The estimated ejection fraction is 60%.  · Normal left ventricular diastolic function.  · Normal right ventricular size with normal right ventricular systolic function.  · Mild tricuspid regurgitation.      CURRENT/PREVIOUS VISIT EKG  Results for orders placed or performed in visit on 01/20/22   IN OFFICE EKG 12-LEAD (to [place])    Collection Time: 01/20/22  3:09 PM    Narrative    Test Reason : I48.0,    Vent. Rate : 079 BPM     Atrial Rate : 079 BPM     P-R Int : 150 ms          QRS Dur : 084 ms      QT Int : 380 ms       P-R-T Axes : 074 070 070 degrees     QTc Int : 435 ms    Normal sinus rhythm  Possible Anterior infarct ,age undetermined  Abnormal ECG  When compared with ECG of 20-SEP-2021 10:32,  No significant change was found  Confirmed by Kurt Koo MD ([id number]) on 2/27/2022 8:03:11 PM    Referred By:             Confirmed By:Kurt Koo MD     No valid procedures specified.   Results for orders placed during the hospital encounter of 03/18/22    Exercise Stress - EKG    Interpretation Summary    The EKG portion of this study is negative for ischemia.    The patient reported no chest pain during the stress test.    The blood pressure response to stress was normal.    There were no arrhythmias during stress.    Patient exercised on a [place] protocol for 5 minutes and 24 seconds and attained a maximum heart rate of 167 beats per minute which is 102% of the maximum predicted heart rate and attained 7.1 METS.    No valid procedures specified.    PHYSICAL EXAM  CONSTITUTIONAL: Well built, well nourished in no apparent distress  NECK: no carotid bruit, no JVD  LUNGS: CTA  CHEST WALL: no tenderness  HEART: regular rate and rhythm, S1, S2 normal, no murmur, click, rub or gallop   ABDOMEN: soft, non-tender; bowel sounds normal; no masses,  no organomegaly  EXTREMITIES: Extremities normal, no edema, no calf tenderness noted  NEURO: AAO X 3    I HAVE REVIEWED :     The vital signs, nurses notes, and all the pertinent radiology and labs.        Current Outpatient Medications   Medication Instructions    alendronate (FOSAMAX) 70 MG tablet Fosamax 70 mg tablet    ALPRAZolam (XANAX) 0.25 mg, Oral, Daily PRN    ammonium lactate (LAC-HYDRIN) 12 % lotion Topical (Top), 2 times daily PRN    aspirin 81 mg Cap 1 capsule, Oral, Every other day    azelastine (ASTELIN) 274 mcg, Nasal, 2 times daily    benzocaine-menthoL (CHLORASEPTIC MAX) 15-10 mg Lozg 1 lozenge, Mucous Membrane, Every 2 hours PRN    BEPREVE 1.5 % Drop PLACE 1 DROP INTO OU BID    calcium carbonate-vitamin D3 (CALTRATE 600 PLUS D) 600 mg (1,500 mg)-800 unit Chew Caltrate 600 plus D  600 mg (1,500 mg)-800 unit chewable tablet   Take 1 tablet twice a day by oral route.    diphenhydrAMINE (BENADRYL) 25 mg, Oral, Every 6 hours PRN    flecainide (TAMBOCOR) 100 mg, Oral, Daily    fluticasone propionate (FLONASE) 50 mcg, Each Nostril, Daily    methylPREDNISolone (MEDROL DOSEPACK) 4 mg tablet use as directed    metoprolol succinate (TOPROL-XL) 12.5 mg, Oral, 2 times daily    montelukast (SINGULAIR) 10 mg, Oral, Daily    omeprazole (PRILOSEC) 40 mg, Oral, Daily PRN    phenazopyridine (PYRIDIUM) 200 mg, Oral, 3 times daily PRN    triamcinolone acetonide 0.5% (KENALOG) 0.5 % Crea Topical (Top), 2 times daily          Assessment & Plan     PAF (paroxysmal atrial fibrillation)  Continue on present therapy this should include metoprolol are succinate 25 mg half a tablet she is taking quite a tablet twice daily recommend increase this to 12.5 mg twice daily for few days and then 25 mg once a day.  Continue on flecainide ablet at 100 mg daily.  Continue on magnesium supplements as well.  Because of low chads score she is maintaining on aspirin alone continue this    Mitral valve prolapse  Relatively stable at this time    Hyperthyroidism  Now resolved.    Gastroesophageal reflux disease without esophagitis  Continue on PPI agents as  needed.  Prilosec at 40 mg a day    Tracheobronchitis  This appears to be improving.  Advised her to avoid using DayQuil NyQuil and other over-the-counter are antihistamines except Zyrtec or Allegra.  And she can use regular potassium cough set up          Follow up in about 6 months (around 5/3/2023).

## 2022-11-10 ENCOUNTER — PATIENT MESSAGE (OUTPATIENT)
Dept: FAMILY MEDICINE | Facility: CLINIC | Age: 62
End: 2022-11-10
Payer: COMMERCIAL

## 2022-11-30 ENCOUNTER — OFFICE VISIT (OUTPATIENT)
Dept: FAMILY MEDICINE | Facility: CLINIC | Age: 62
End: 2022-11-30
Payer: COMMERCIAL

## 2022-11-30 VITALS
SYSTOLIC BLOOD PRESSURE: 120 MMHG | HEART RATE: 73 BPM | RESPIRATION RATE: 16 BRPM | HEIGHT: 70 IN | OXYGEN SATURATION: 97 % | DIASTOLIC BLOOD PRESSURE: 72 MMHG | BODY MASS INDEX: 25.51 KG/M2 | WEIGHT: 178.19 LBS

## 2022-11-30 DIAGNOSIS — Z13.220 ENCOUNTER FOR LIPID SCREENING FOR CARDIOVASCULAR DISEASE: Primary | ICD-10-CM

## 2022-11-30 DIAGNOSIS — Z13.6 ENCOUNTER FOR LIPID SCREENING FOR CARDIOVASCULAR DISEASE: Primary | ICD-10-CM

## 2022-11-30 DIAGNOSIS — Z12.31 SCREENING MAMMOGRAM FOR BREAST CANCER: ICD-10-CM

## 2022-11-30 PROCEDURE — 99396 PREV VISIT EST AGE 40-64: CPT | Mod: 25,S$GLB,, | Performed by: STUDENT IN AN ORGANIZED HEALTH CARE EDUCATION/TRAINING PROGRAM

## 2022-11-30 PROCEDURE — 99999 PR PBB SHADOW E&M-EST. PATIENT-LVL V: ICD-10-PCS | Mod: PBBFAC,,, | Performed by: STUDENT IN AN ORGANIZED HEALTH CARE EDUCATION/TRAINING PROGRAM

## 2022-11-30 PROCEDURE — 3078F DIAST BP <80 MM HG: CPT | Mod: S$GLB,,, | Performed by: STUDENT IN AN ORGANIZED HEALTH CARE EDUCATION/TRAINING PROGRAM

## 2022-11-30 PROCEDURE — 1160F RVW MEDS BY RX/DR IN RCRD: CPT | Mod: S$GLB,,, | Performed by: STUDENT IN AN ORGANIZED HEALTH CARE EDUCATION/TRAINING PROGRAM

## 2022-11-30 PROCEDURE — 3008F PR BODY MASS INDEX (BMI) DOCUMENTED: ICD-10-PCS | Mod: S$GLB,,, | Performed by: STUDENT IN AN ORGANIZED HEALTH CARE EDUCATION/TRAINING PROGRAM

## 2022-11-30 PROCEDURE — 3074F SYST BP LT 130 MM HG: CPT | Mod: S$GLB,,, | Performed by: STUDENT IN AN ORGANIZED HEALTH CARE EDUCATION/TRAINING PROGRAM

## 2022-11-30 PROCEDURE — 90686 IIV4 VACC NO PRSV 0.5 ML IM: CPT | Mod: S$GLB,,, | Performed by: STUDENT IN AN ORGANIZED HEALTH CARE EDUCATION/TRAINING PROGRAM

## 2022-11-30 PROCEDURE — 99999 PR PBB SHADOW E&M-EST. PATIENT-LVL V: CPT | Mod: PBBFAC,,, | Performed by: STUDENT IN AN ORGANIZED HEALTH CARE EDUCATION/TRAINING PROGRAM

## 2022-11-30 PROCEDURE — 3044F HG A1C LEVEL LT 7.0%: CPT | Mod: S$GLB,,, | Performed by: STUDENT IN AN ORGANIZED HEALTH CARE EDUCATION/TRAINING PROGRAM

## 2022-11-30 PROCEDURE — 90686 FLU VACCINE (QUAD) GREATER THAN OR EQUAL TO 3YO PRESERVATIVE FREE IM: ICD-10-PCS | Mod: S$GLB,,, | Performed by: STUDENT IN AN ORGANIZED HEALTH CARE EDUCATION/TRAINING PROGRAM

## 2022-11-30 PROCEDURE — 3078F PR MOST RECENT DIASTOLIC BLOOD PRESSURE < 80 MM HG: ICD-10-PCS | Mod: S$GLB,,, | Performed by: STUDENT IN AN ORGANIZED HEALTH CARE EDUCATION/TRAINING PROGRAM

## 2022-11-30 PROCEDURE — 99396 PR PREVENTIVE VISIT,EST,40-64: ICD-10-PCS | Mod: 25,S$GLB,, | Performed by: STUDENT IN AN ORGANIZED HEALTH CARE EDUCATION/TRAINING PROGRAM

## 2022-11-30 PROCEDURE — 1159F MED LIST DOCD IN RCRD: CPT | Mod: S$GLB,,, | Performed by: STUDENT IN AN ORGANIZED HEALTH CARE EDUCATION/TRAINING PROGRAM

## 2022-11-30 PROCEDURE — 1160F PR REVIEW ALL MEDS BY PRESCRIBER/CLIN PHARMACIST DOCUMENTED: ICD-10-PCS | Mod: S$GLB,,, | Performed by: STUDENT IN AN ORGANIZED HEALTH CARE EDUCATION/TRAINING PROGRAM

## 2022-11-30 PROCEDURE — 1159F PR MEDICATION LIST DOCUMENTED IN MEDICAL RECORD: ICD-10-PCS | Mod: S$GLB,,, | Performed by: STUDENT IN AN ORGANIZED HEALTH CARE EDUCATION/TRAINING PROGRAM

## 2022-11-30 PROCEDURE — 3008F BODY MASS INDEX DOCD: CPT | Mod: S$GLB,,, | Performed by: STUDENT IN AN ORGANIZED HEALTH CARE EDUCATION/TRAINING PROGRAM

## 2022-11-30 PROCEDURE — 3074F PR MOST RECENT SYSTOLIC BLOOD PRESSURE < 130 MM HG: ICD-10-PCS | Mod: S$GLB,,, | Performed by: STUDENT IN AN ORGANIZED HEALTH CARE EDUCATION/TRAINING PROGRAM

## 2022-11-30 PROCEDURE — 90471 FLU VACCINE (QUAD) GREATER THAN OR EQUAL TO 3YO PRESERVATIVE FREE IM: ICD-10-PCS | Mod: S$GLB,,, | Performed by: STUDENT IN AN ORGANIZED HEALTH CARE EDUCATION/TRAINING PROGRAM

## 2022-11-30 PROCEDURE — 3044F PR MOST RECENT HEMOGLOBIN A1C LEVEL <7.0%: ICD-10-PCS | Mod: S$GLB,,, | Performed by: STUDENT IN AN ORGANIZED HEALTH CARE EDUCATION/TRAINING PROGRAM

## 2022-11-30 PROCEDURE — 90471 IMMUNIZATION ADMIN: CPT | Mod: S$GLB,,, | Performed by: STUDENT IN AN ORGANIZED HEALTH CARE EDUCATION/TRAINING PROGRAM

## 2022-11-30 RX ORDER — AMMONIUM LACTATE 12 G/100G
LOTION TOPICAL 2 TIMES DAILY PRN
Qty: 225 G | Refills: 0 | Status: CANCELLED | OUTPATIENT
Start: 2022-11-30

## 2022-11-30 RX ORDER — FLUOROMETHOLONE 1 MG/ML
SUSPENSION/ DROPS OPHTHALMIC
COMMUNITY
Start: 2022-11-17

## 2022-11-30 RX ORDER — ALPRAZOLAM 0.25 MG/1
0.25 TABLET ORAL DAILY PRN
Qty: 30 TABLET | Refills: 0 | Status: CANCELLED | OUTPATIENT
Start: 2022-11-30

## 2022-11-30 RX ORDER — AZELASTINE 1 MG/ML
2 SPRAY, METERED NASAL 2 TIMES DAILY
Qty: 30 ML | Refills: 1 | Status: CANCELLED | OUTPATIENT
Start: 2022-11-30

## 2022-11-30 RX ORDER — OMEPRAZOLE 40 MG/1
40 CAPSULE, DELAYED RELEASE ORAL DAILY PRN
Qty: 90 CAPSULE | Refills: 3 | Status: CANCELLED | OUTPATIENT
Start: 2022-11-30

## 2022-11-30 NOTE — PROGRESS NOTES
Subjective:       Patient ID: Anthony Apple is a 62 y.o. female.    Chief Complaint: Annual Exam    Well controlled chronic conditions  Up to date on her colonoscopy and cleared on the 5 year ace.  She is following with her thyroid doctor.  She had a bone density in 2021 with osteopenia  She is due for lipid and for mammogram    Wt Readings from Last 6 Encounters:  11/30/22 : 80.8 kg (178 lb 3.2 oz)  11/03/22 : 79.8 kg (176 lb)  10/24/22 : 79.6 kg (175 lb 7.8 oz)  10/01/22 : 79.4 kg (175 lb)  09/12/22 : 79.4 kg (175 lb)  09/06/22 : 78 kg (172 lb)          Review of Systems   Constitutional:  Negative for activity change, appetite change, fatigue and unexpected weight change.   HENT:  Negative for trouble swallowing.    Eyes:  Negative for visual disturbance.   Respiratory:  Negative for shortness of breath.    Cardiovascular:  Negative for chest pain and leg swelling.   Gastrointestinal:  Negative for abdominal pain, blood in stool, change in bowel habit and change in bowel habit.   Endocrine: Negative for cold intolerance, heat intolerance, polydipsia and polyuria.   Genitourinary:  Negative for dysuria and hematuria.   Musculoskeletal:  Negative for arthralgias, back pain and gait problem.   Integumentary:  Negative for rash and wound.   Neurological:  Negative for dizziness, weakness and headaches.   Psychiatric/Behavioral:  Negative for dysphoric mood and sleep disturbance. The patient is not nervous/anxious.        Objective:      Physical Exam  Constitutional:       General: She is not in acute distress.     Appearance: Normal appearance. She is not ill-appearing.   Eyes:      Conjunctiva/sclera: Conjunctivae normal.   Neck:      Vascular: No carotid bruit.   Cardiovascular:      Rate and Rhythm: Normal rate and regular rhythm.      Pulses: Normal pulses.      Heart sounds: Normal heart sounds. No murmur heard.  Pulmonary:      Effort: Pulmonary effort is normal. No respiratory distress.      Breath  sounds: Normal breath sounds. No wheezing.   Abdominal:      Palpations: Abdomen is soft.      Tenderness: There is no abdominal tenderness.   Musculoskeletal:      Right lower leg: No edema.      Left lower leg: No edema.   Lymphadenopathy:      Cervical: No cervical adenopathy.   Skin:     General: Skin is warm and dry.      Findings: No rash.      Comments: Seborrheic keratosis on multiple areas- right shoulder, bilateral arms   Neurological:      Mental Status: She is alert. Mental status is at baseline.      Gait: Gait normal.   Psychiatric:         Mood and Affect: Mood normal.         Behavior: Behavior normal.         Thought Content: Thought content normal.         Judgment: Judgment normal.       Assessment:       1. Encounter for lipid screening for cardiovascular disease    2. Screening mammogram for breast cancer          Plan:       Problem List Items Addressed This Visit    None  Visit Diagnoses       Encounter for lipid screening for cardiovascular disease    -  Primary    Relevant Orders    Lipid Panel    Screening mammogram for breast cancer        Relevant Orders    Mammo Digital Screening [facility] w/ [facility]

## 2022-12-06 DIAGNOSIS — K21.9 GASTROESOPHAGEAL REFLUX DISEASE WITHOUT ESOPHAGITIS: ICD-10-CM

## 2022-12-06 DIAGNOSIS — F41.0 PANIC DISORDER: ICD-10-CM

## 2022-12-06 RX ORDER — OMEPRAZOLE 40 MG/1
40 CAPSULE, DELAYED RELEASE ORAL DAILY PRN
Qty: 90 CAPSULE | Refills: 3 | Status: CANCELLED | OUTPATIENT
Start: 2022-12-06

## 2022-12-06 RX ORDER — ALPRAZOLAM 0.25 MG/1
0.25 TABLET ORAL DAILY PRN
Qty: 30 TABLET | Refills: 0 | Status: CANCELLED | OUTPATIENT
Start: 2022-12-06

## 2022-12-08 ENCOUNTER — PATIENT MESSAGE (OUTPATIENT)
Dept: FAMILY MEDICINE | Facility: CLINIC | Age: 62
End: 2022-12-08
Payer: COMMERCIAL

## 2022-12-08 ENCOUNTER — LAB VISIT (OUTPATIENT)
Dept: LAB | Facility: CLINIC | Age: 62
End: 2022-12-08
Payer: COMMERCIAL

## 2022-12-08 DIAGNOSIS — Z13.6 ENCOUNTER FOR LIPID SCREENING FOR CARDIOVASCULAR DISEASE: ICD-10-CM

## 2022-12-08 DIAGNOSIS — L85.3 DRY SKIN: ICD-10-CM

## 2022-12-08 DIAGNOSIS — F41.0 PANIC DISORDER: ICD-10-CM

## 2022-12-08 DIAGNOSIS — K21.9 GASTROESOPHAGEAL REFLUX DISEASE WITHOUT ESOPHAGITIS: ICD-10-CM

## 2022-12-08 DIAGNOSIS — Z13.220 ENCOUNTER FOR LIPID SCREENING FOR CARDIOVASCULAR DISEASE: ICD-10-CM

## 2022-12-08 DIAGNOSIS — J30.2 SEASONAL ALLERGIES: ICD-10-CM

## 2022-12-08 LAB
CHOLEST SERPL-MCNC: 185 MG/DL (ref 120–199)
CHOLEST/HDLC SERPL: 3.2 {RATIO} (ref 2–5)
HDLC SERPL-MCNC: 57 MG/DL (ref 40–75)
HDLC SERPL: 30.8 % (ref 20–50)
LDLC SERPL CALC-MCNC: 109.8 MG/DL (ref 63–159)
NONHDLC SERPL-MCNC: 128 MG/DL
TRIGL SERPL-MCNC: 91 MG/DL (ref 30–150)

## 2022-12-08 PROCEDURE — 80061 LIPID PANEL: CPT | Performed by: STUDENT IN AN ORGANIZED HEALTH CARE EDUCATION/TRAINING PROGRAM

## 2022-12-09 RX ORDER — ALPRAZOLAM 0.25 MG/1
0.25 TABLET ORAL DAILY PRN
Qty: 30 TABLET | Refills: 0 | Status: SHIPPED | OUTPATIENT
Start: 2022-12-09 | End: 2023-04-05 | Stop reason: SDUPTHER

## 2022-12-09 RX ORDER — OMEPRAZOLE 40 MG/1
40 CAPSULE, DELAYED RELEASE ORAL DAILY PRN
Qty: 90 CAPSULE | Refills: 3 | Status: SHIPPED | OUTPATIENT
Start: 2022-12-09 | End: 2023-12-18

## 2022-12-09 RX ORDER — AMMONIUM LACTATE 12 G/100G
LOTION TOPICAL 2 TIMES DAILY PRN
Qty: 225 G | Refills: 1 | Status: SHIPPED | OUTPATIENT
Start: 2022-12-09 | End: 2023-11-28 | Stop reason: SDUPTHER

## 2022-12-09 RX ORDER — AZELASTINE 1 MG/ML
2 SPRAY, METERED NASAL 2 TIMES DAILY
Qty: 30 ML | Refills: 1 | Status: SHIPPED | OUTPATIENT
Start: 2022-12-09 | End: 2023-06-01 | Stop reason: SDUPTHER

## 2023-01-09 ENCOUNTER — HOSPITAL ENCOUNTER (OUTPATIENT)
Dept: RADIOLOGY | Facility: HOSPITAL | Age: 63
Discharge: HOME | End: 2023-01-09
Attending: STUDENT IN AN ORGANIZED HEALTH CARE EDUCATION/TRAINING PROGRAM
Payer: COMMERCIAL

## 2023-01-09 DIAGNOSIS — Z12.31 SCREENING MAMMOGRAM FOR BREAST CANCER: ICD-10-CM

## 2023-01-09 PROCEDURE — 77067 SCR MAMMO BI INCL CAD: CPT | Mod: TC,PO

## 2023-01-17 ENCOUNTER — OFFICE VISIT (OUTPATIENT)
Dept: FAMILY MEDICINE | Facility: CLINIC | Age: 63
End: 2023-01-17
Payer: COMMERCIAL

## 2023-01-17 VITALS
DIASTOLIC BLOOD PRESSURE: 82 MMHG | HEART RATE: 77 BPM | BODY MASS INDEX: 25.69 KG/M2 | WEIGHT: 179 LBS | TEMPERATURE: 98 F | SYSTOLIC BLOOD PRESSURE: 118 MMHG | OXYGEN SATURATION: 97 %

## 2023-01-17 DIAGNOSIS — S39.012A STRAIN OF MUSCLE, FASCIA AND TENDON OF LOWER BACK, INITIAL ENCOUNTER: Primary | ICD-10-CM

## 2023-01-17 DIAGNOSIS — M62.830 SPASM OF MUSCLE OF LOWER BACK: ICD-10-CM

## 2023-01-17 PROCEDURE — 99213 PR OFFICE/OUTPT VISIT, EST, LEVL III, 20-29 MIN: ICD-10-PCS | Mod: ,,, | Performed by: NURSE PRACTITIONER

## 2023-01-17 PROCEDURE — 3074F SYST BP LT 130 MM HG: CPT | Mod: ,,, | Performed by: NURSE PRACTITIONER

## 2023-01-17 PROCEDURE — 99213 OFFICE O/P EST LOW 20 MIN: CPT | Mod: ,,, | Performed by: NURSE PRACTITIONER

## 2023-01-17 PROCEDURE — 3079F DIAST BP 80-89 MM HG: CPT | Mod: ,,, | Performed by: NURSE PRACTITIONER

## 2023-01-17 PROCEDURE — 1159F MED LIST DOCD IN RCRD: CPT | Mod: ,,, | Performed by: NURSE PRACTITIONER

## 2023-01-17 PROCEDURE — 3079F PR MOST RECENT DIASTOLIC BLOOD PRESSURE 80-89 MM HG: ICD-10-PCS | Mod: ,,, | Performed by: NURSE PRACTITIONER

## 2023-01-17 PROCEDURE — 3008F PR BODY MASS INDEX (BMI) DOCUMENTED: ICD-10-PCS | Mod: ,,, | Performed by: NURSE PRACTITIONER

## 2023-01-17 PROCEDURE — 3074F PR MOST RECENT SYSTOLIC BLOOD PRESSURE < 130 MM HG: ICD-10-PCS | Mod: ,,, | Performed by: NURSE PRACTITIONER

## 2023-01-17 PROCEDURE — 1159F PR MEDICATION LIST DOCUMENTED IN MEDICAL RECORD: ICD-10-PCS | Mod: ,,, | Performed by: NURSE PRACTITIONER

## 2023-01-17 PROCEDURE — 3008F BODY MASS INDEX DOCD: CPT | Mod: ,,, | Performed by: NURSE PRACTITIONER

## 2023-01-17 RX ORDER — VARENICLINE 0.03 MG/.05ML
1 SPRAY NASAL 2 TIMES DAILY
COMMUNITY
Start: 2022-11-17

## 2023-01-17 RX ORDER — METHOCARBAMOL 500 MG/1
500 TABLET, FILM COATED ORAL 3 TIMES DAILY
Qty: 30 TABLET | Refills: 0 | Status: SHIPPED | OUTPATIENT
Start: 2023-01-17 | End: 2023-01-27

## 2023-01-17 RX ORDER — UREA 40 G/100G
LOTION TOPICAL
COMMUNITY
Start: 2022-12-09

## 2023-01-17 NOTE — PROGRESS NOTES
"Subjective:       Patient ID: Anthony Apple is a 62 y.o. female.    Chief Complaint: Back Pain (Patient is here today with a c/o lower to mid back pain that began Thursday/Friday of last week following exercising during physical therapy. Patient reports that she is experiencing pain in episodes where it feels as if her back "catches" when trying to bend or sit, etc. Patient rates pain at 7/10 on pain scale.)    Anthony Apple presents to the clinic today for complaints of back pain  Back Pain:  Patient is here today with a c/o lower to mid back pain that began Thursday/Friday of last week following exercising during physical therapy. Patient reports that she is experiencing pain in episodes where it feels as if her back "catches" when trying to bend forward or sideways, attempting to get in and out of the vehicle or sit, etc. Patient rates pain at 7/10 on pain scale. Reports taking Aleve x1 day and has been using a heating pad and hot showers with slight relief. Has not tried any topicals or ice.   Was scheduled to have PT on Thursday, but reports that she has cancelled this and will restart next week.       Back Pain  This is a recurrent problem. The current episode started in the past 7 days. The problem occurs intermittently. The problem is unchanged. The pain is present in the lumbar spine. The quality of the pain is described as cramping and shooting. The pain is at a severity of 7/10. The pain is moderate. The pain is Worse during the day. The symptoms are aggravated by bending, position, standing and twisting. Stiffness is present All day. Pertinent negatives include no abdominal pain, bladder incontinence, bowel incontinence, chest pain, dysuria, fever, headaches, leg pain, numbness, paresis, paresthesias, pelvic pain, perianal numbness, tingling, weakness or weight loss. The treatment provided mild relief.   Review of Systems   Constitutional:  Negative for fever and weight loss.   Cardiovascular:  " Negative for chest pain.   Gastrointestinal:  Negative for abdominal pain and bowel incontinence.   Genitourinary:  Negative for bladder incontinence, dysuria, hematuria and pelvic pain.   Musculoskeletal:  Positive for back pain.   Neurological:  Negative for tingling, weakness, numbness, headaches and paresthesias.     Patient Active Problem List   Diagnosis    PAF (paroxysmal atrial fibrillation)    Mitral valve prolapse    Family history of colon cancer    Gastroesophageal reflux disease without esophagitis    Hyperthyroidism    Osteoporosis    Seasonal allergies    Panic disorder    Sciatica of right side    Paroxysmal atrial fibrillation    COVID-19    Chondral defect of right patella    Situational depression    Impaired functional mobility and activity tolerance    Tracheobronchitis       Objective:      Physical Exam  Constitutional:       General: She is not in acute distress.     Appearance: Normal appearance.   Eyes:      Conjunctiva/sclera: Conjunctivae normal.   Cardiovascular:      Rate and Rhythm: Normal rate and regular rhythm.      Heart sounds: Normal heart sounds. No murmur heard.  Pulmonary:      Effort: Pulmonary effort is normal. No respiratory distress.      Breath sounds: Normal breath sounds.   Musculoskeletal:      Thoracic back: Spasms and tenderness present.      Lumbar back: Spasms and tenderness present. Decreased range of motion.   Neurological:      Mental Status: She is alert.       Lab Results   Component Value Date    WBC 4.67 12/20/2021    HGB 13.0 12/20/2021    HCT 40.6 12/20/2021     12/20/2021    CHOL 185 12/08/2022    TRIG 91 12/08/2022    HDL 57 12/08/2022    ALT 20 12/20/2021    AST 25 12/20/2021     10/07/2022    K 4.4 10/07/2022     10/07/2022    CREATININE 0.7 10/07/2022    BUN 15 10/07/2022    CO2 27 10/07/2022    TSH 1.870 10/07/2022    HGBA1C 5.0 09/14/2022     The 10-year ASCVD risk score (Endy THOMPSON, et al., 2019) is: 3.2%    Values used to  calculate the score:      Age: 62 years      Sex: Female      Is Non- : No      Diabetic: No      Tobacco smoker: No      Systolic Blood Pressure: 118 mmHg      Is BP treated: No      HDL Cholesterol: 57 mg/dL      Total Cholesterol: 185 mg/dL  Visit Vitals  /82 (BP Location: Left arm, Patient Position: Sitting, BP Method: Large (Manual))   Pulse 77   Temp 98.3 °F (36.8 °C) (Oral)   Wt 81.2 kg (179 lb 0.2 oz)   SpO2 97%   BMI 25.69 kg/m²      Assessment:       1. Strain of muscle, fascia and tendon of lower back, initial encounter    2. Spasm of muscle of lower back        Plan:       1. Strain of muscle, fascia and tendon of lower back, initial encounter  -     methocarbamoL (ROBAXIN) 500 MG Tab; Take 1 tablet (500 mg total) by mouth 3 (three) times daily. for 10 days  Dispense: 30 tablet; Refill: 0  Use ice versus heat. At least 15-20 minutes BID   Aleve with food for the next 2-3 days   Robaxin as needed  Topicals: Voltaren. Biofreeze  Continue low back stretches/sciatica stretches.   2. Spasm of muscle of lower back       No follow-ups on file.      Future Appointments       Date Provider Specialty Appt Notes    1/18/2023 Cornelia Burns MD Family Medicine back ache

## 2023-04-04 ENCOUNTER — TELEPHONE (OUTPATIENT)
Dept: CARDIOLOGY | Facility: CLINIC | Age: 63
End: 2023-04-04
Payer: COMMERCIAL

## 2023-04-04 ENCOUNTER — PATIENT MESSAGE (OUTPATIENT)
Dept: CARDIOLOGY | Facility: CLINIC | Age: 63
End: 2023-04-04
Payer: COMMERCIAL

## 2023-04-04 NOTE — TELEPHONE ENCOUNTER
----- Message from Yuliet Diez sent at 4/4/2023  8:20 AM CDT -----  Regarding: Medical Assistance  Contact: Patient  Patient is requesting a call back in reference to medication prescribed due to a root canal   Patient stated she believes it is a medication she should not be taking due to a fib and would like to verify with physician or nurse  Patient stated she is supposed to begin taking antibiotic this morning and needs to know whether she has to request another medication    Please Assist      Patient can be reached at [phone]

## 2023-04-05 ENCOUNTER — PATIENT MESSAGE (OUTPATIENT)
Dept: FAMILY MEDICINE | Facility: CLINIC | Age: 63
End: 2023-04-05
Payer: COMMERCIAL

## 2023-04-05 DIAGNOSIS — F41.0 PANIC DISORDER: ICD-10-CM

## 2023-04-05 RX ORDER — ALPRAZOLAM 0.25 MG/1
0.25 TABLET ORAL DAILY PRN
Qty: 30 TABLET | Refills: 0 | Status: SHIPPED | OUTPATIENT
Start: 2023-04-05 | End: 2023-11-28 | Stop reason: SDUPTHER

## 2023-04-06 ENCOUNTER — OFFICE VISIT (OUTPATIENT)
Dept: PODIATRY | Facility: CLINIC | Age: 63
End: 2023-04-06
Payer: COMMERCIAL

## 2023-04-06 VITALS — WEIGHT: 179 LBS | HEIGHT: 70 IN | RESPIRATION RATE: 18 BRPM | BODY MASS INDEX: 25.62 KG/M2

## 2023-04-06 DIAGNOSIS — B35.3 TINEA PEDIS OF BOTH FEET: Primary | ICD-10-CM

## 2023-04-06 DIAGNOSIS — L60.2 ONYCHOGRYPHOSIS: ICD-10-CM

## 2023-04-06 PROCEDURE — 1159F PR MEDICATION LIST DOCUMENTED IN MEDICAL RECORD: ICD-10-PCS | Mod: CPTII,S$GLB,, | Performed by: PODIATRIST

## 2023-04-06 PROCEDURE — 1160F PR REVIEW ALL MEDS BY PRESCRIBER/CLIN PHARMACIST DOCUMENTED: ICD-10-PCS | Mod: CPTII,S$GLB,, | Performed by: PODIATRIST

## 2023-04-06 PROCEDURE — 99203 PR OFFICE/OUTPT VISIT, NEW, LEVL III, 30-44 MIN: ICD-10-PCS | Mod: S$GLB,,, | Performed by: PODIATRIST

## 2023-04-06 PROCEDURE — 3008F BODY MASS INDEX DOCD: CPT | Mod: CPTII,S$GLB,, | Performed by: PODIATRIST

## 2023-04-06 PROCEDURE — 1159F MED LIST DOCD IN RCRD: CPT | Mod: CPTII,S$GLB,, | Performed by: PODIATRIST

## 2023-04-06 PROCEDURE — 99203 OFFICE O/P NEW LOW 30 MIN: CPT | Mod: S$GLB,,, | Performed by: PODIATRIST

## 2023-04-06 PROCEDURE — 1160F RVW MEDS BY RX/DR IN RCRD: CPT | Mod: CPTII,S$GLB,, | Performed by: PODIATRIST

## 2023-04-06 PROCEDURE — 3008F PR BODY MASS INDEX (BMI) DOCUMENTED: ICD-10-PCS | Mod: CPTII,S$GLB,, | Performed by: PODIATRIST

## 2023-04-06 RX ORDER — CLOTRIMAZOLE AND BETAMETHASONE DIPROPIONATE 10; .64 MG/G; MG/G
CREAM TOPICAL 2 TIMES DAILY
Qty: 45 G | Refills: 11 | Status: SHIPPED | OUTPATIENT
Start: 2023-04-06

## 2023-04-06 RX ORDER — EFINACONAZOLE 100 MG/ML
1 SOLUTION TOPICAL DAILY
Qty: 8 ML | Refills: 3 | Status: SHIPPED | OUTPATIENT
Start: 2023-04-06 | End: 2023-06-01

## 2023-04-06 NOTE — PROGRESS NOTES
"  [address] [facility] [address] [place] [address]  Phone: [phone]   [phone]    Patient's PCP:Cornelia Burns MD  Referring Provider: Aaareferral Self    Subjective:      Chief Complaint:: Tinea Pedis and Nail Problem    HPI  Anthony Apple is a 62 y.o. female who presents today with a complaint of fungal nails. Reports no trauma. Symptoms include dystrophic nails bilateral feet.    Additionally, patient presents with dry skin and heel fissuring of both feet.    Vitals:    04/06/23 0917   Resp: 18   Weight: 81.2 kg (179 lb)   Height: 5' 10" (1.778 m)   PainSc: 0-No pain      Shoe Size:     Past Surgical History:   Procedure Laterality Date    none       Past Medical History:   Diagnosis Date    Atrial fibrillation     GERD (gastroesophageal reflux disease)     Hyperthyroidism     Osteoporosis     Panic attack     Seasonal allergies      Family History   Problem Relation Age of Onset    Stroke Mother     Arthritis Mother     Thyroid disease Mother     Colon cancer Father     Atrial fibrillation Father     Thyroid disease Father         Social History:   Marital Status:   Alcohol History:  reports current alcohol use of about 2.0 standard drinks per week.  Tobacco History:  reports that she has never smoked. She has never used smokeless tobacco.  Drug History:  reports that she does not currently use drugs.    Review of patient's allergies indicates:   Allergen Reactions    Cefdinir Anaphylaxis, Rash and Shortness Of Breath    Nitrofurantoin macrocrystal Itching    Sulfamethoxazole-trimethoprim Itching, Other (See Comments) and Rash       Current Outpatient Medications   Medication Sig Dispense Refill    alendronate (FOSAMAX) 70 MG tablet Fosamax 70 mg tablet      ALPRAZolam (XANAX) 0.25 MG tablet Take 1 tablet (0.25 mg total) by mouth daily as needed for Anxiety. 30 tablet 0    ammonium lactate (LAC-HYDRIN) 12 % lotion Apply topically 2 (two) times daily as needed for Dry Skin. 225 g 1    " aspirin 81 mg Cap Take 1 capsule by mouth every other day.      azelastine (ASTELIN) 137 mcg (0.1 %) nasal spray 2 sprays (274 mcg total) by Nasal route 2 (two) times daily. 30 mL 1    BEPREVE 1.5 % Drop PLACE 1 DROP INTO OU BID      calcium carbonate-vitamin D3 (CALTRATE 600 PLUS D) 600 mg (1,500 mg)-800 unit Chew Caltrate 600 plus D  600 mg (1,500 mg)-800 unit chewable tablet   Take 1 tablet twice a day by oral route.      clotrimazole-betamethasone 1-0.05% (LOTRISONE) cream Apply topically 2 (two) times daily. 45 g 11    diphenhydrAMINE (BENADRYL) 25 mg capsule Take 25 mg by mouth every 6 (six) hours as needed for Itching.      efinaconazole (JUBLIA) 10 % Inna Apply 1 Bottle topically once daily. 8 mL 3    flecainide (TAMBOCOR) 100 MG Tab Take 1 tablet (100 mg total) by mouth once daily. 30 tablet 11    fluorometholone 0.1% (FML) 0.1 % DrpS Place into both eyes.      fluticasone propionate (FLONASE) 50 mcg/actuation nasal spray 1 spray (50 mcg total) by Each Nostril route once daily. 15.8 mL 0    metoprolol succinate (TOPROL-XL) 25 MG 24 hr tablet Take 0.5 tablets (12.5 mg total) by mouth 2 (two) times daily. 90 tablet 3    montelukast (SINGULAIR) 10 mg tablet Take 1 tablet (10 mg total) by mouth once daily. 90 tablet 3    omeprazole (PRILOSEC) 40 MG capsule Take 1 capsule (40 mg total) by mouth daily as needed (reflux). 90 capsule 3    triamcinolone acetonide 0.5% (KENALOG) 0.5 % Crea Apply topically 2 (two) times daily. 15 g 1    TYRVAYA 0.03 mg/spray sprm 1 spray by Each Nostril route 2 (two) times daily.      urea 40 % Lotn lotion       urea-hydrophilic cream Lotn Apply 1 application topically once daily. 480 mL 1     No current facility-administered medications for this visit.       Review of Systems   Constitutional:  Negative for chills, fatigue, fever and unexpected weight change.   HENT:  Negative for hearing loss and trouble swallowing.    Eyes:  Negative for photophobia and visual disturbance.    Respiratory:  Negative for cough, shortness of breath and wheezing.    Cardiovascular:  Negative for chest pain, palpitations and leg swelling.   Gastrointestinal:  Negative for abdominal pain and nausea.   Genitourinary:  Negative for dysuria and frequency.   Musculoskeletal:  Negative for arthralgias, back pain, gait problem, joint swelling and myalgias.   Skin:  Negative for rash and wound.   Neurological:  Negative for tremors, seizures, weakness, numbness and headaches.   Hematological:  Does not bruise/bleed easily.       Objective:        Physical Exam:   Foot Exam  Physical Exam  Ortho/SPM Exam   Constitutional: Patient is oriented to person, place, and time. Patient appears well-developed and well-nourished. No acute distress.      Psychiatric: Patient has a normal mood and affect. Patient's speech is normal and behavior is normal. Judgment is normal. Cognition and memory are normal.     Dry scale with superficial flakes without ulceration, drainage, pus, tracking, fluctuance, malodor, or cardinal signs infection.    Toenails 1st, 2nd, 3rd, 4th, 5th  bilateral are dystrophic, discolored tanish brown with tan.    Otherwise, Skin is normal age and health appropriate color, turgor, texture, and temperature bilateral lower extremities without ulceration, hyperpigmentation, discoloration, masses nodules or cords palpated.  No ecchymosis, erythema, edema, or cardinal signs of infection bilateral lower extremities.    Adequate joint range of motion without pain, limitation, nor crepitation Bilateral feet and ankle joints. Muscle strength is 5/5 in all groups bilaterally.     Protective sensation intact. Pain sensation intact bilateral feet and legs.    DP and PT pulses 2/4 bilateral, capillary refill 3 seconds all toes/distal feet, all toes/both feet warm to touch.   Negative lower extremity edema bilateral.    Imaging:            Assessment:       1. Tinea pedis of both feet    2. Onychogryphosis      Plan:    Tinea pedis of both feet  -     efinaconazole (JUBLIA) 10 % Inna; Apply 1 Bottle topically once daily.  Dispense: 8 mL; Refill: 3  -     clotrimazole-betamethasone 1-0.05% (LOTRISONE) cream; Apply topically 2 (two) times daily.  Dispense: 45 g; Refill: 11    Onychogryphosis  -     efinaconazole (JUBLIA) 10 % Inna; Apply 1 Bottle topically once daily.  Dispense: 8 mL; Refill: 3  -     clotrimazole-betamethasone 1-0.05% (LOTRISONE) cream; Apply topically 2 (two) times daily.  Dispense: 45 g; Refill: 11      No follow-ups on file.    Procedures          Counseling:     I provided patient education verbally regarding:   Patient diagnosis, treatment options, as well as alternatives, risks, and benefits.     This note was created using Dragon voice recognition software that occasionally misinterpreted phrases or words.       Fungal infection of toenails explained. Treatment options including no treatment, periodic debridement, topical medications, oral medications, and removal of the nail were discussed, as well as success rates and risks of recurrence. We agreed on topical medication- Jublia prescribed      Fungal infection of skin explained. Treatment options including no treatment, topical medications, oral medications were discussed, as well as success rates and risks of recurrence. We agreed on topical medication - Lotrisone prescribed      Athlete's foot counseling: Counseled patient that it is important to keep the feet dry. Use absorbent cotton socks and change them if they become sweaty. Or wear an open-toe shoe or sandal. Wash the feet at least once a day with soap and water. Apply the antifungal cream as prescribed. Recommend spray antiperspirant to the feet. Some antifungal creams are available without a prescription. It may take a week before the rash starts to improve. It can take about 3 to 4 weeks to completely clear. Continue the medicine until the rash is all gone. Use over-the-counter antifungal  powders or sprays on your feet after exposure to high-risk environments, such as public showers, gyms, and locker rooms. This can help prevent future infections. Wearing appropriate shoes in these situations can help.           Patient should call the office immediately if any signs of infection, such as fever, chills, sweats, increased redness or pain.    Patient was instructed to call the clinic or go to the emergency department if their symptoms do not improve, worsens, or if new symptoms develop.  Patient was advised that if any increased swelling, pain, or numbness arise to go immediately to the ED. Patient knows to call any time if an emergency arises. Shared decision making occurred and patient verbalized understanding in agreement with this plan.

## 2023-04-06 NOTE — PATIENT INSTRUCTIONS
Nail Fungal Infection  A nail fungal infection changes the way fingernails and toenails look. They may thicken, discolor, change shape, or split. This condition is hard to treat because nails grow slowly and have limited blood supply. The infection often comes back after treatment.  There are 2 types of medicines used to treat this condition:  Topical anti-fungal medicines. These are applied to the surface of the skin and nail area. These medicines are not very effective because they cant get deep into the nail.  Oral antifungal medicines. These medicines work better because they go into the nail from the inside out. But the infection may still come back. It may take 9 to 12 months for your nail to look normal again. This means you are cured. You can repeat treatment if needed. Most people take these medicines without any problems. It is rare to stop therapy because of side effects. But your healthcare provider may give you some monitoring tests. Talk about possible side effects with your provider before starting treatment.  If medicines fail, the nail can be removed surgically or chemically. These methods physically remove the fungus from the body. This helps medical treatment be more effective.  Home care  Use medicines exactly as directed for as long as directed. Treating a fungal infection can take longer than other kinds of infections.  Smoking is a risk factor for fungal infection. This is one more reason to quit.  Wear absorbent socks, and shoes that let your feet breathe. Sweaty feet increase your risk of fungal infection. They also make an existing infection harder to treat.  Use footwear when in damp public places like swimming pools, gyms, and shower rooms. This will help you avoid the fungus that grows there.  Don't share nail clippers or scissors with others.  Follow-up care  Follow up with your healthcare provider, or as advised.  When to seek medical advice  Call your healthcare provider right away  if any of these occur:  Skin by the nail becomes red, swollen, painful, or drains pus (a creamy yellow or white liquid)  Side effects from oral anti-fungal medicines  Date Last Reviewed: 8/1/2016  © 6425-7914 [employer]. [address] [place] [address]. All rights reserved. This information is not intended as a substitute for professional medical care. Always follow your healthcare professional's instructions.

## 2023-05-02 ENCOUNTER — OFFICE VISIT (OUTPATIENT)
Dept: URGENT CARE | Facility: CLINIC | Age: 63
End: 2023-05-02
Payer: COMMERCIAL

## 2023-05-02 VITALS
BODY MASS INDEX: 25.68 KG/M2 | OXYGEN SATURATION: 98 % | SYSTOLIC BLOOD PRESSURE: 117 MMHG | WEIGHT: 179 LBS | DIASTOLIC BLOOD PRESSURE: 79 MMHG | TEMPERATURE: 98 F | RESPIRATION RATE: 16 BRPM | HEART RATE: 67 BPM

## 2023-05-02 DIAGNOSIS — K30 INDIGESTION: ICD-10-CM

## 2023-05-02 DIAGNOSIS — R19.7 DIARRHEA, UNSPECIFIED TYPE: ICD-10-CM

## 2023-05-02 DIAGNOSIS — K52.9 GASTROENTERITIS: ICD-10-CM

## 2023-05-02 DIAGNOSIS — A05.9 FOOD POISONING: Primary | ICD-10-CM

## 2023-05-02 PROCEDURE — 99214 PR OFFICE/OUTPT VISIT, EST, LEVL IV, 30-39 MIN: ICD-10-PCS | Mod: S$GLB,,, | Performed by: NURSE PRACTITIONER

## 2023-05-02 PROCEDURE — 99214 OFFICE O/P EST MOD 30 MIN: CPT | Mod: S$GLB,,, | Performed by: NURSE PRACTITIONER

## 2023-05-02 RX ORDER — CIPROFLOXACIN 500 MG/1
500 TABLET ORAL EVERY 12 HOURS
Qty: 14 TABLET | Refills: 0 | Status: SHIPPED | OUTPATIENT
Start: 2023-05-02 | End: 2023-05-09

## 2023-05-02 NOTE — PROGRESS NOTES
Subjective:      Patient ID: Anthony Apple is a 62 y.o. female.    Vitals:  weight is 81.2 kg (179 lb). Her temperature is 98.2 °F (36.8 °C). Her blood pressure is 117/79 and her pulse is 67. Her respiration is 16 and oxygen saturation is 98%.     Chief Complaint: Diarrhea    Anthony Apple presents to clinic with abdominal pain, bloating and diarrhea that has been present for the last 5 days.  Patient denies any fever or chills.  Patient does report that she initially was having greater than 15 bowel movements a day that were watery in nature and have a most a yellow color.  Patient reports that her  has also been symptomatic for the same period of time.  Patient does report that she and her family do not eat out much but the day before she and her  became symptomatic they both ate salad at [employer].  Patient reports that both of their symptoms developed within 24 hours and neither have had fever.  Patient reports that her  is actually little bit better than her.  Patient does report that approximately 3 weeks ago she took a 3 week course of clindamycin as well and she is wondering if this may be why her symptoms have been more severe.  Patient denies seeing any blood in her stool.  Patient does not overall report any nausea but does experience a loss of appetite as well as indigestion and bloating especially after she tries to eat anything.  Patient does report that she has been able to tolerate fluids overall well but has trouble with solids.  Patient reports that she has experienced a reduction in the number of loose stools that she is had per day and on yesterday had approximally 5 loose stools.  Patient reports that she is progressively been getting better until she ate chicken pot pie on last night which she feels this caused    Diarrhea   This is a new problem. The current episode started in the past 7 days (5 days). The problem occurs 2 to 4 times per day. The problem has been  unchanged. Associated symptoms include abdominal pain and bloating. Associated symptoms comments: Upper body aches .     Constitution: Negative.   HENT: Negative.     Neck: neck negative.   Cardiovascular: Negative.    Eyes: Negative.    Respiratory: Negative.     Gastrointestinal:  Positive for abdominal pain and diarrhea.   Endocrine: negative.   Genitourinary: Negative.    Musculoskeletal: Negative.    Skin: Negative.     Objective:     Physical Exam   Constitutional: She is oriented to person, place, and time. She appears well-developed. She appears ill.   HENT:   Head: Normocephalic and atraumatic.   Ears:   Right Ear: External ear normal.   Left Ear: External ear normal.   Nose: Nose normal.   Mouth/Throat: Mucous membranes are normal.   Eyes: Conjunctivae and lids are normal.   Neck: Trachea normal. Neck supple.   Cardiovascular: Normal rate, regular rhythm and normal heart sounds.   Pulmonary/Chest: Effort normal and breath sounds normal. No respiratory distress.   Abdominal: Normal appearance and bowel sounds are normal. She exhibits no shifting dullness, no distension, no pulsatile midline mass and no mass. Soft. flat abdomen There is no splenomegaly or hepatomegaly. There is no abdominal tenderness. No hernia.   Musculoskeletal: Normal range of motion.         General: Normal range of motion.   Neurological: She is alert and oriented to person, place, and time. She has normal strength.   Skin: Skin is warm, dry, intact, not diaphoretic and not pale.   Psychiatric: Her speech is normal and behavior is normal. Judgment and thought content normal.   Nursing note and vitals reviewed.    Assessment:     1. Food poisoning    2. Diarrhea, unspecified type    3. Gastroenteritis    4. Indigestion        Plan:       Food poisoning  -     Stool culture; Future; Expected date: 05/02/2023  -     Clostridium difficile EIA; Future; Expected date: 05/02/2023  -     Stool Exam-Ova,Cysts,Parasites; Future; Expected date:  05/02/2023    Diarrhea, unspecified type  -     Stool culture; Future; Expected date: 05/02/2023  -     Clostridium difficile EIA; Future; Expected date: 05/02/2023  -     Stool Exam-Ova,Cysts,Parasites; Future; Expected date: 05/02/2023    Gastroenteritis  -     Stool culture; Future; Expected date: 05/02/2023  -     Clostridium difficile EIA; Future; Expected date: 05/02/2023  -     Stool Exam-Ova,Cysts,Parasites; Future; Expected date: 05/02/2023    Indigestion  -     Stool culture; Future; Expected date: 05/02/2023  -     Clostridium difficile EIA; Future; Expected date: 05/02/2023  -     Stool Exam-Ova,Cysts,Parasites; Future; Expected date: 05/02/2023    Other orders  -     ciprofloxacin HCl (CIPRO) 500 MG tablet; Take 1 tablet (500 mg total) by mouth every 12 (twelve) hours. for 7 days  Dispense: 14 tablet; Refill: 0        Patient has been advised to take over-the-counter probiotics maximum strength for the next month.  Patient has been advised that if this point the benefit of treating her with either azithromycin or ciprofloxacin outweigh the risk as she very likely has food-borne illness related to the consumption of let us that was potentially contaminated with either E coli Salmonella.  Stool studies will be completed at this time if patient is able to provide stool sample in clinic.  Patient states that her cardiologist has told her specifically not to take azithromycin but did not tell if she could not take a fluoroquinolone so we will proceed with treating with ciprofloxacin at this time as well as probiotics.  Patient will also resume omeprazole 40 mg daily to help with the indigestion and bloating.  Patient has been advised to follow a bland diet over the next week and very slowly reintroduce herself to foods.  Patient has been advised to discontinue see for Cipro after day 3 if her symptoms have completely resolved but if she is still having symptoms to finish the 7 day course.  Patient may also  re-evaluate ciprofloxacin needs on day 5.  The goal of treatment here is to discontinue ciprofloxacin as soon as possible given that it does have an increase risk of arrhythmias and patient is currently being treated for AFib.  Patient verbalizes understanding and will follow up with primary care.

## 2023-05-10 LAB
BACTERIA SPEC CULT: NORMAL
BACTERIA SPEC CULT: NORMAL
C DIFF TOX GENS STL QL NAA+PROBE: NEGATIVE
CAMPYLOBACTER STL CULT: NORMAL
E COLI SXT STL QL IA: NEGATIVE
O+P SPEC MICRO: NORMAL
O+P STL CONC: NORMAL
SALM + SHIG STL CULT: NORMAL

## 2023-06-01 ENCOUNTER — OFFICE VISIT (OUTPATIENT)
Dept: FAMILY MEDICINE | Facility: CLINIC | Age: 63
End: 2023-06-01
Payer: COMMERCIAL

## 2023-06-01 ENCOUNTER — LAB VISIT (OUTPATIENT)
Dept: LAB | Facility: CLINIC | Age: 63
End: 2023-06-01
Payer: COMMERCIAL

## 2023-06-01 VITALS
BODY MASS INDEX: 25.15 KG/M2 | WEIGHT: 175.25 LBS | HEART RATE: 88 BPM | DIASTOLIC BLOOD PRESSURE: 72 MMHG | SYSTOLIC BLOOD PRESSURE: 118 MMHG | OXYGEN SATURATION: 99 %

## 2023-06-01 DIAGNOSIS — J34.89 NASAL CONGESTION WITH RHINORRHEA: Primary | ICD-10-CM

## 2023-06-01 DIAGNOSIS — I48.0 PAF (PAROXYSMAL ATRIAL FIBRILLATION): ICD-10-CM

## 2023-06-01 DIAGNOSIS — R53.83 FATIGUE, UNSPECIFIED TYPE: ICD-10-CM

## 2023-06-01 DIAGNOSIS — J30.2 SEASONAL ALLERGIES: ICD-10-CM

## 2023-06-01 DIAGNOSIS — R09.81 NASAL CONGESTION WITH RHINORRHEA: Primary | ICD-10-CM

## 2023-06-01 LAB
ALBUMIN SERPL BCP-MCNC: 4.2 G/DL (ref 3.5–5.2)
ALP SERPL-CCNC: 68 U/L (ref 55–135)
ALT SERPL W/O P-5'-P-CCNC: 32 U/L (ref 10–44)
ANION GAP SERPL CALC-SCNC: 10 MMOL/L (ref 8–16)
AST SERPL-CCNC: 33 U/L (ref 10–40)
BASOPHILS # BLD AUTO: 0.02 K/UL (ref 0–0.2)
BASOPHILS NFR BLD: 0.2 % (ref 0–1.9)
BILIRUB SERPL-MCNC: 0.9 MG/DL (ref 0.1–1)
BUN SERPL-MCNC: 13 MG/DL (ref 8–23)
CALCIUM SERPL-MCNC: 10.1 MG/DL (ref 8.7–10.5)
CHLORIDE SERPL-SCNC: 102 MMOL/L (ref 95–110)
CO2 SERPL-SCNC: 25 MMOL/L (ref 23–29)
CREAT SERPL-MCNC: 0.8 MG/DL (ref 0.5–1.4)
DIFFERENTIAL METHOD: ABNORMAL
EOSINOPHIL # BLD AUTO: 0.2 K/UL (ref 0–0.5)
EOSINOPHIL NFR BLD: 1.5 % (ref 0–8)
ERYTHROCYTE [DISTWIDTH] IN BLOOD BY AUTOMATED COUNT: 12.3 % (ref 11.5–14.5)
EST. GFR  (NO RACE VARIABLE): >60 ML/MIN/1.73 M^2
GLUCOSE SERPL-MCNC: 97 MG/DL (ref 70–110)
HCT VFR BLD AUTO: 38 % (ref 37–48.5)
HGB BLD-MCNC: 12.5 G/DL (ref 12–16)
IMM GRANULOCYTES # BLD AUTO: 0.04 K/UL (ref 0–0.04)
IMM GRANULOCYTES NFR BLD AUTO: 0.4 % (ref 0–0.5)
LYMPHOCYTES # BLD AUTO: 1.2 K/UL (ref 1–4.8)
LYMPHOCYTES NFR BLD: 12.2 % (ref 18–48)
MCH RBC QN AUTO: 30.8 PG (ref 27–31)
MCHC RBC AUTO-ENTMCNC: 32.9 G/DL (ref 32–36)
MCV RBC AUTO: 94 FL (ref 82–98)
MONOCYTES # BLD AUTO: 0.6 K/UL (ref 0.3–1)
MONOCYTES NFR BLD: 6 % (ref 4–15)
NEUTROPHILS # BLD AUTO: 7.8 K/UL (ref 1.8–7.7)
NEUTROPHILS NFR BLD: 79.7 % (ref 38–73)
NRBC BLD-RTO: 0 /100 WBC
PLATELET # BLD AUTO: 182 K/UL (ref 150–450)
PMV BLD AUTO: 10.1 FL (ref 9.2–12.9)
POTASSIUM SERPL-SCNC: 5 MMOL/L (ref 3.5–5.1)
PROT SERPL-MCNC: 7.7 G/DL (ref 6–8.4)
RBC # BLD AUTO: 4.06 M/UL (ref 4–5.4)
SODIUM SERPL-SCNC: 137 MMOL/L (ref 136–145)
WBC # BLD AUTO: 9.74 K/UL (ref 3.9–12.7)

## 2023-06-01 PROCEDURE — 3078F PR MOST RECENT DIASTOLIC BLOOD PRESSURE < 80 MM HG: ICD-10-PCS | Mod: ,,, | Performed by: NURSE PRACTITIONER

## 2023-06-01 PROCEDURE — 85025 COMPLETE CBC W/AUTO DIFF WBC: CPT | Performed by: NURSE PRACTITIONER

## 2023-06-01 PROCEDURE — 80053 COMPREHEN METABOLIC PANEL: CPT | Performed by: NURSE PRACTITIONER

## 2023-06-01 PROCEDURE — 3078F DIAST BP <80 MM HG: CPT | Mod: ,,, | Performed by: NURSE PRACTITIONER

## 2023-06-01 PROCEDURE — 1159F MED LIST DOCD IN RCRD: CPT | Mod: ,,, | Performed by: NURSE PRACTITIONER

## 2023-06-01 PROCEDURE — 1160F RVW MEDS BY RX/DR IN RCRD: CPT | Mod: ,,, | Performed by: NURSE PRACTITIONER

## 2023-06-01 PROCEDURE — 3074F PR MOST RECENT SYSTOLIC BLOOD PRESSURE < 130 MM HG: ICD-10-PCS | Mod: ,,, | Performed by: NURSE PRACTITIONER

## 2023-06-01 PROCEDURE — 3008F BODY MASS INDEX DOCD: CPT | Mod: ,,, | Performed by: NURSE PRACTITIONER

## 2023-06-01 PROCEDURE — 1159F PR MEDICATION LIST DOCUMENTED IN MEDICAL RECORD: ICD-10-PCS | Mod: ,,, | Performed by: NURSE PRACTITIONER

## 2023-06-01 PROCEDURE — 1160F PR REVIEW ALL MEDS BY PRESCRIBER/CLIN PHARMACIST DOCUMENTED: ICD-10-PCS | Mod: ,,, | Performed by: NURSE PRACTITIONER

## 2023-06-01 PROCEDURE — 99214 OFFICE O/P EST MOD 30 MIN: CPT | Mod: ,,, | Performed by: NURSE PRACTITIONER

## 2023-06-01 PROCEDURE — 3074F SYST BP LT 130 MM HG: CPT | Mod: ,,, | Performed by: NURSE PRACTITIONER

## 2023-06-01 PROCEDURE — 99214 PR OFFICE/OUTPT VISIT, EST, LEVL IV, 30-39 MIN: ICD-10-PCS | Mod: ,,, | Performed by: NURSE PRACTITIONER

## 2023-06-01 PROCEDURE — 3008F PR BODY MASS INDEX (BMI) DOCUMENTED: ICD-10-PCS | Mod: ,,, | Performed by: NURSE PRACTITIONER

## 2023-06-01 RX ORDER — FLUTICASONE PROPIONATE 50 MCG
1 SPRAY, SUSPENSION (ML) NASAL DAILY
Qty: 15.8 ML | Refills: 0 | Status: SHIPPED | OUTPATIENT
Start: 2023-06-01

## 2023-06-01 RX ORDER — AZELASTINE 1 MG/ML
2 SPRAY, METERED NASAL 2 TIMES DAILY
Qty: 30 ML | Refills: 1 | Status: SHIPPED | OUTPATIENT
Start: 2023-06-01

## 2023-06-01 RX ORDER — CLINDAMYCIN HYDROCHLORIDE 150 MG/1
150 CAPSULE ORAL 3 TIMES DAILY
COMMUNITY
Start: 2023-04-04 | End: 2023-06-01 | Stop reason: ALTCHOICE

## 2023-06-01 RX ORDER — AMPICILLIN TRIHYDRATE 500 MG
1000 CAPSULE ORAL DAILY
COMMUNITY
Start: 2023-04-12

## 2023-06-01 NOTE — PROGRESS NOTES
Subjective:       Patient ID: Anthony Apple is a 62 y.o. female.    Chief Complaint: Sinus Problem (Patient reports she has been experiencing aches, post nasal drip/sinus drainage, sore throat/pain with swallowing, productive yellow/green cough, chest congestion, bilateral ear pain, and diarrhea off and on for approximately 3 weeks-1month. Patient reports she is taking Mucinex and benadryl OTC. Patient advises she has completed 2 home COVID tests, both of which were negative. The most recent COVID test was completed on Monday of this week. )    Anthony Apple presents to the clinic today for ongoing sinus concerns and fatigue  Sinus Problem:  Patient reports she has been experiencing aches, post nasal drip/sinus drainage, sore throat/pain with swallowing, productive yellow/green cough, chest congestion, bilateral ear pain, and diarrhea off and on for approximately 3 weeks-1month. Patient reports she is taking Mucinex and benadryl OTC. Patient advises she has completed 2 home COVID tests, both of which were negative. The most recent COVID test was completed on Monday of this week. During this time she was on a course of Clindamycin from her dentist that did cause GI upset. She was seen at the  and given Cipro, but reports that she did not take this  Has a history of environmental allergies- has Flonase, Astelin and Singulair at home, but has not been utilizing       Review of Systems   Constitutional:  Positive for fatigue.   HENT:  Positive for congestion, ear pain, postnasal drip, rhinorrhea and sore throat.    Respiratory:  Positive for cough. Negative for chest tightness, shortness of breath and wheezing.    Gastrointestinal:  Positive for diarrhea.   Musculoskeletal:  Positive for myalgias.   Allergic/Immunologic: Positive for environmental allergies.     Patient Active Problem List   Diagnosis    PAF (paroxysmal atrial fibrillation)    Mitral valve prolapse    Family history of colon cancer     Gastroesophageal reflux disease without esophagitis    Hyperthyroidism    Osteoporosis    Seasonal allergies    Panic disorder    Sciatica of right side    Paroxysmal atrial fibrillation    COVID-19    Chondral defect of right patella    Situational depression    Impaired functional mobility and activity tolerance    Tracheobronchitis       Objective:      Physical Exam  Constitutional:       General: She is not in acute distress.     Appearance: Normal appearance.   HENT:      Right Ear: Tympanic membrane normal.      Left Ear: Tympanic membrane normal.      Nose: Rhinorrhea present. Rhinorrhea is clear.      Mouth/Throat:      Tongue: No lesions.      Pharynx: Oropharynx is clear. Uvula midline. No pharyngeal swelling, oropharyngeal exudate or posterior oropharyngeal erythema.   Eyes:      Extraocular Movements: Extraocular movements intact.      Conjunctiva/sclera: Conjunctivae normal.   Cardiovascular:      Rate and Rhythm: Normal rate and regular rhythm.      Heart sounds: Normal heart sounds. No murmur heard.  Pulmonary:      Effort: Pulmonary effort is normal. No respiratory distress.      Breath sounds: Normal breath sounds.   Lymphadenopathy:      Cervical: No cervical adenopathy.   Neurological:      Mental Status: She is alert and oriented to person, place, and time.   Psychiatric:         Mood and Affect: Mood normal.         Behavior: Behavior normal.       Lab Results   Component Value Date    WBC 4.67 12/20/2021    HGB 13.0 12/20/2021    HCT 40.6 12/20/2021     12/20/2021    CHOL 185 12/08/2022    TRIG 91 12/08/2022    HDL 57 12/08/2022    ALT 20 12/20/2021    AST 25 12/20/2021     10/07/2022    K 4.4 10/07/2022     10/07/2022    CREATININE 0.7 10/07/2022    BUN 15 10/07/2022    CO2 27 10/07/2022    TSH 1.870 10/07/2022    HGBA1C 5.0 09/14/2022     The 10-year ASCVD risk score (Endy THOMPSON, et al., 2019) is: 3.2%    Values used to calculate the score:      Age: 62 years      Sex:  Female      Is Non- : No      Diabetic: No      Tobacco smoker: No      Systolic Blood Pressure: 118 mmHg      Is BP treated: No      HDL Cholesterol: 57 mg/dL      Total Cholesterol: 185 mg/dL  Visit Vitals  /72 (BP Location: Right arm, Patient Position: Sitting, BP Method: Large (Manual))   Pulse 88   Wt 79.5 kg (175 lb 4.3 oz)   SpO2 99%   BMI 25.15 kg/m²      Assessment:       1. Nasal congestion with rhinorrhea    2. Seasonal allergies    3. Fatigue, unspecified type    4. PAF (paroxysmal atrial fibrillation)        Plan:       1. Nasal congestion with rhinorrhea  -     azelastine (ASTELIN) 137 mcg (0.1 %) nasal spray; 2 sprays (274 mcg total) by Nasal route 2 (two) times daily.  Dispense: 30 mL; Refill: 1  -     fluticasone propionate (FLONASE) 50 mcg/actuation nasal spray; 1 spray (50 mcg total) by Each Nostril route once daily.  Dispense: 15.8 mL; Refill: 0  saline nasal flushes 2-3 times daily/as needed for increased sinus/nasal congestion  Warm compresses to the face for sinus pressure  Take medications as prescribed  Warm salt water gargles, throat lozenges, warm honey/lemon as needed for sore throat  maintain hydration  Continue Mucinex otc as directed for productive cough  cool mist humidifier at night for increased congestion  Stop benadryl- can be thickening mucus- use Zyrtec, Claritin, Allegra instead     2. Seasonal allergies    3. Fatigue, unspecified type  -     CBC Auto Differential; Future; Expected date: 06/01/2023    4. PAF (paroxysmal atrial fibrillation)  -     Comprehensive Metabolic Panel; Future; Expected date: 06/01/2023  Keep scheduled follow up with Dr. Koo      No follow-ups on file.      Future Appointments       Date Provider Specialty Appt Notes    6/1/2023  Lab .    6/21/2023 Kurt Koo MD Cardiology checkup

## 2023-06-08 ENCOUNTER — DOCUMENTATION ONLY (OUTPATIENT)
Dept: REHABILITATION | Facility: HOSPITAL | Age: 63
End: 2023-06-08
Payer: COMMERCIAL

## 2023-06-08 DIAGNOSIS — M54.31 SCIATICA OF RIGHT SIDE: ICD-10-CM

## 2023-06-08 DIAGNOSIS — Z74.09 IMPAIRED FUNCTIONAL MOBILITY AND ACTIVITY TOLERANCE: Primary | ICD-10-CM

## 2023-06-08 NOTE — PROGRESS NOTES
[facility]  PT Discharge Note    Name: Anthony Apple  Clinic Number: [id number]    Therapy Diagnosis:   Encounter Diagnoses   Name Primary?    Impaired functional mobility and activity tolerance Yes    Sciatica of right side      Physician: No ref. provider found       Physician: Cornelia Burns MD     Physician Orders: PT Eval and Treat Hip, Back  Medical Diagnosis from Referral: Sciatica of right side   Evaluation Date: 10/11/2022    Date of Last visit: 10/13/2022  Total Visits Received: 2    ASSESSMENT      Patient attended 2 of 12 planned PT sessions; thus, she did not make significant progress.  See treatment notes for details.    Discharge reason: Patient has not attended therapy since 10/13/2022    Discharge FOTO Score: N/A    Goals: as noted in final treatment note  Short Term Goals:    1) Decrease frequency of symptoms to intermittent Ongoing  2) Facilitate improved LE flexibility Initiated  3) Facilitate improved posture Ongoing  4) Patient will stand with equal weight bearing without increased LE symptoms Ongoing      Long Term Goals:   1) Patient will walk @ increased eduar for her fitness activities without increased LE symptoms Ongoing  2) Patient will sleep at least 6 hours uninterrupted by pain 6 of 7 days per week Ongoing   3) Patient will consistently perform car transfer without increased pain Ongoing  4) Improve functional ability to < 20% as indicated by FOTO Lower Leg (w/o Knee) survey Ongoing  5) Patient will be independent in complete HEP Ongoing      PLAN   This patient is discharged from Physical Therapy      Desi Stubbs, PT

## 2023-06-21 ENCOUNTER — OFFICE VISIT (OUTPATIENT)
Dept: CARDIOLOGY | Facility: CLINIC | Age: 63
End: 2023-06-21
Payer: COMMERCIAL

## 2023-06-21 VITALS
HEART RATE: 66 BPM | BODY MASS INDEX: 24.91 KG/M2 | HEIGHT: 70 IN | WEIGHT: 174 LBS | OXYGEN SATURATION: 98 % | SYSTOLIC BLOOD PRESSURE: 128 MMHG | RESPIRATION RATE: 16 BRPM | DIASTOLIC BLOOD PRESSURE: 80 MMHG

## 2023-06-21 DIAGNOSIS — I34.1 MVP (MITRAL VALVE PROLAPSE): ICD-10-CM

## 2023-06-21 DIAGNOSIS — I48.0 PAROXYSMAL ATRIAL FIBRILLATION: Primary | ICD-10-CM

## 2023-06-21 DIAGNOSIS — E05.90 HYPERTHYROIDISM: ICD-10-CM

## 2023-06-21 DIAGNOSIS — J40 TRACHEOBRONCHITIS: ICD-10-CM

## 2023-06-21 DIAGNOSIS — R00.8 VENTRICULAR BIGEMINY SEEN ON CARDIAC MONITOR: ICD-10-CM

## 2023-06-21 DIAGNOSIS — U07.1 COVID-19: ICD-10-CM

## 2023-06-21 PROCEDURE — 93000 EKG 12-LEAD: ICD-10-PCS | Mod: S$GLB,,, | Performed by: INTERNAL MEDICINE

## 2023-06-21 PROCEDURE — 3079F DIAST BP 80-89 MM HG: CPT | Mod: CPTII,S$GLB,, | Performed by: INTERNAL MEDICINE

## 2023-06-21 PROCEDURE — 99999 PR PBB SHADOW E&M-EST. PATIENT-LVL V: CPT | Mod: PBBFAC,,, | Performed by: INTERNAL MEDICINE

## 2023-06-21 PROCEDURE — 3008F PR BODY MASS INDEX (BMI) DOCUMENTED: ICD-10-PCS | Mod: CPTII,S$GLB,, | Performed by: INTERNAL MEDICINE

## 2023-06-21 PROCEDURE — 1160F PR REVIEW ALL MEDS BY PRESCRIBER/CLIN PHARMACIST DOCUMENTED: ICD-10-PCS | Mod: CPTII,S$GLB,, | Performed by: INTERNAL MEDICINE

## 2023-06-21 PROCEDURE — 1159F MED LIST DOCD IN RCRD: CPT | Mod: CPTII,S$GLB,, | Performed by: INTERNAL MEDICINE

## 2023-06-21 PROCEDURE — 1160F RVW MEDS BY RX/DR IN RCRD: CPT | Mod: CPTII,S$GLB,, | Performed by: INTERNAL MEDICINE

## 2023-06-21 PROCEDURE — 3079F PR MOST RECENT DIASTOLIC BLOOD PRESSURE 80-89 MM HG: ICD-10-PCS | Mod: CPTII,S$GLB,, | Performed by: INTERNAL MEDICINE

## 2023-06-21 PROCEDURE — 99214 PR OFFICE/OUTPT VISIT, EST, LEVL IV, 30-39 MIN: ICD-10-PCS | Mod: S$GLB,,, | Performed by: INTERNAL MEDICINE

## 2023-06-21 PROCEDURE — 1159F PR MEDICATION LIST DOCUMENTED IN MEDICAL RECORD: ICD-10-PCS | Mod: CPTII,S$GLB,, | Performed by: INTERNAL MEDICINE

## 2023-06-21 PROCEDURE — 3074F SYST BP LT 130 MM HG: CPT | Mod: CPTII,S$GLB,, | Performed by: INTERNAL MEDICINE

## 2023-06-21 PROCEDURE — 3008F BODY MASS INDEX DOCD: CPT | Mod: CPTII,S$GLB,, | Performed by: INTERNAL MEDICINE

## 2023-06-21 PROCEDURE — 99214 OFFICE O/P EST MOD 30 MIN: CPT | Mod: S$GLB,,, | Performed by: INTERNAL MEDICINE

## 2023-06-21 PROCEDURE — 99999 PR PBB SHADOW E&M-EST. PATIENT-LVL V: ICD-10-PCS | Mod: PBBFAC,,, | Performed by: INTERNAL MEDICINE

## 2023-06-21 PROCEDURE — 3074F PR MOST RECENT SYSTOLIC BLOOD PRESSURE < 130 MM HG: ICD-10-PCS | Mod: CPTII,S$GLB,, | Performed by: INTERNAL MEDICINE

## 2023-06-21 PROCEDURE — 93000 ELECTROCARDIOGRAM COMPLETE: CPT | Mod: S$GLB,,, | Performed by: INTERNAL MEDICINE

## 2023-06-21 NOTE — PROGRESS NOTES
Subjective:    Patient ID:  Anthony Apple is a 62 y.o. female     Chief Complaint   Patient presents with    Atrial Fibrillation       HPI:  S Anthony Apple is a 62 y.o. female Is here for follow-up.  Patient has been doing well no specific complaints.  Magnesium has helped him significantly.  And she feels a whole lot better she has not had any significant palpitations or fluttering sensation except when she had a recent cold and a viral-like infection and was not feeling very well.  And she also had a recent cataract surgery and she is been on eyedrops.  Lately she is been feeling fatigued and her blood pressure seemed to be running on the lower side.  At the present time patient denies any chest pain tightness or heaviness and denies any shortness of breath or difficulty in breathing denies any loss of consciousness falls or head injury.        Review of patient's allergies indicates:   Allergen Reactions    Cefdinir Anaphylaxis, Rash and Shortness Of Breath    Nitrofurantoin macrocrystal Itching    Sulfamethoxazole-trimethoprim Itching, Other (See Comments) and Rash       Past Medical History:   Diagnosis Date    Atrial fibrillation     GERD (gastroesophageal reflux disease)     Hyperthyroidism     Osteoporosis     Panic attack     Seasonal allergies      Past Surgical History:   Procedure Laterality Date    none       Social History     Tobacco Use    Smoking status: Never    Smokeless tobacco: Never   Substance Use Topics    Alcohol use: Yes     Alcohol/week: 2.0 standard drinks     Types: 1 Glasses of wine, 1 Cans of beer per week     Comment: on occasion     Drug use: Not Currently     Family History   Problem Relation Age of Onset    Stroke Mother     Arthritis Mother     Thyroid disease Mother     Colon cancer Father     Atrial fibrillation Father     Thyroid disease Father         Review of Systems:   Constitution: Negative for diaphoresis and fever. Intermittent fatigue  HEENT: Negative for  nosebleeds.    Cardiovascular: Negative for chest pain       No dyspnea on exertion       No leg swelling        No palpitations  Respiratory: Negative for shortness of breath and wheezing.    Hematologic/Lymphatic: Negative for bleeding problem. Does not bruise/bleed easily.   Skin: Negative for color change and rash.   Musculoskeletal: Negative for falls and myalgias.   Gastrointestinal: Negative for hematemesis and hematochezia.   Genitourinary: Negative for hematuria.   Neurological: Negative for dizziness and light-headedness.   Psychiatric/Behavioral: Negative for altered mental status and memory loss.          Objective:        Vitals:    06/21/23 0829   BP: 128/80   Pulse: 66   Resp: 16       Lab Results   Component Value Date    WBC 9.74 06/01/2023    HGB 12.5 06/01/2023    HCT 38.0 06/01/2023     06/01/2023    CHOL 185 12/08/2022    TRIG 91 12/08/2022    HDL 57 12/08/2022    ALT 32 06/01/2023    AST 33 06/01/2023     06/01/2023    K 5.0 06/01/2023     06/01/2023    CREATININE 0.8 06/01/2023    BUN 13 06/01/2023    CO2 25 06/01/2023    TSH 1.870 10/07/2022    HGBA1C 5.0 09/14/2022        ECHOCARDIOGRAM RESULTS  Results for orders placed during the hospital encounter of 03/18/22    Echo    Interpretation Summary  · The left ventricle is normal in size with concentric remodeling and normal systolic function.  · The estimated ejection fraction is 60%.  · Normal left ventricular diastolic function.  · Normal right ventricular size with normal right ventricular systolic function.  · Mild tricuspid regurgitation.        CURRENT/PREVIOUS VISIT EKG  Results for orders placed or performed in visit on 01/20/22   IN OFFICE EKG 12-LEAD (to [place])    Collection Time: 01/20/22  3:09 PM    Narrative    Test Reason : I48.0,    Vent. Rate : 079 BPM     Atrial Rate : 079 BPM     P-R Int : 150 ms          QRS Dur : 084 ms      QT Int : 380 ms       P-R-T Axes : 074 070 070 degrees     QTc Int : 435  ms    Normal sinus rhythm  Possible Anterior infarct ,age undetermined  Abnormal ECG  When compared with ECG of 20-SEP-2021 10:32,  No significant change was found  Confirmed by Kurt Koo MD ([id number]) on 2/27/2022 8:03:11 PM    Referred By:             Confirmed By:Kurt Koo MD     No valid procedures specified.   Results for orders placed during the hospital encounter of 03/18/22    Exercise Stress - EKG    Interpretation Summary    The EKG portion of this study is negative for ischemia.    The patient reported no chest pain during the stress test.    The blood pressure response to stress was normal.    There were no arrhythmias during stress.    Patient exercised on a [place] protocol for 5 minutes and 24 seconds and attained a maximum heart rate of 167 beats per minute which is 102% of the maximum predicted heart rate and attained 7.1 METS.      Physical Exam:  CONSTITUTIONAL: No fever, no chills  HEENT: Normocephalic, atraumatic,pupils reactive to light                 NECK:  No JVD no carotid bruit  CVS: S1S2+, RRR,  faint systolic murmurs,   LUNGS: Clear  ABDOMEN: Soft, NT, BS+  EXTREMITIES: No cyanosis, edema  : No roman catheter  NEURO: AAO X 3  PSY: Normal affect      Medication List with Changes/Refills   Current Medications    ALENDRONATE (FOSAMAX) 70 MG TABLET    Fosamax 70 mg tablet    ALPRAZOLAM (XANAX) 0.25 MG TABLET    Take 1 tablet (0.25 mg total) by mouth daily as needed for Anxiety.    AMMONIUM LACTATE (LAC-HYDRIN) 12 % LOTION    Apply topically 2 (two) times daily as needed for Dry Skin.    ASPIRIN 81 MG CAP    Take 1 capsule by mouth every other day.    AZELASTINE (ASTELIN) 137 MCG (0.1 %) NASAL SPRAY    2 sprays (274 mcg total) by Nasal route 2 (two) times daily.    BEPREVE 1.5 % DROP    PLACE 1 DROP INTO OU BID    CALCIUM CARBONATE-VITAMIN D3 (CALTRATE 600 PLUS D) 600 MG (1,500 MG)-800 UNIT CHEW    Caltrate 600 plus D  600 mg (1,500 mg)-800 unit chewable tablet   Take 1 tablet twice a  day by oral route.    CLOTRIMAZOLE-BETAMETHASONE 1-0.05% (LOTRISONE) CREAM    Apply topically 2 (two) times daily.    DIPHENHYDRAMINE (BENADRYL) 25 MG CAPSULE    Take 25 mg by mouth every 6 (six) hours as needed for Itching.    FLECAINIDE (TAMBOCOR) 100 MG TAB    Take 1 tablet (100 mg total) by mouth once daily.    FLUOROMETHOLONE 0.1% (FML) 0.1 % DRPS    Place into both eyes.    FLUTICASONE PROPIONATE (FLONASE) 50 MCG/ACTUATION NASAL SPRAY    1 spray (50 mcg total) by Each Nostril route once daily.    METOPROLOL SUCCINATE (TOPROL-XL) 25 MG 24 HR TABLET    Take 0.5 tablets (12.5 mg total) by mouth 2 (two) times daily.    MONTELUKAST (SINGULAIR) 10 MG TABLET    Take 1 tablet (10 mg total) by mouth once daily.    OMEPRAZOLE (PRILOSEC) 40 MG CAPSULE    Take 1 capsule (40 mg total) by mouth daily as needed (reflux).    TRIAMCINOLONE ACETONIDE 0.5% (KENALOG) 0.5 % CREA    Apply topically 2 (two) times daily.    TYRVAYA 0.03 MG/SPRAY SPRM    1 spray by Each Nostril route 2 (two) times daily.    UREA 40 % LOTN LOTION        UREA-HYDROPHILIC CREAM LOTN    Apply 1 application topically once daily.    VITAMIN D3 25 MCG (1,000 UNIT) CAPSULE    Take 1,000 Units by mouth Daily.             Assessment:       1. Paroxysmal atrial fibrillation    2. Hyperthyroidism    3. Tracheobronchitis    4. MVP (mitral valve prolapse)    5. Ventricular bigeminy seen on cardiac monitor    6. COVID-19         Plan:    1. Paroxysmal atrial fibrillation   Patient is doing very well at the present time.  She has not had any major episodes of atrial fibrillation.  Since she is been on metoprolol and magnesium it has helped her significantly.  And she is feeling better.  She does have flecainide 100 mg iron she  uses it as a pill in the pocket.  Only as needed.  2.   hyperthyroidism  Patient is doing well  and it has been stable.  She has not had any other side effects.  She follows up with the primary physician is checking her thyroid function tests.       3.  Tracheobronchitis   Patient is doing much better with the tracheobronchitis it has improved significantly.  However she recently had another episode of viral infection and she is recovering from it.    4.  Mitral valve prolapse   Patient is stable from mitral valve prolapse.  No untoward effects at the present time.  5. Ventricular bigeminy   On her current EKG she has no PVCs or PACs.  She is doing very well and the rhythm is very stable.  6. EKG  Reviewed EKG independently patient is in normal sinus rhythm with a heart rate of 66 beats per minute normal EKG no acute ST T-wave changes.  7. Continue current management I will see her back in the office in 6 months time.            Problem List Items Addressed This Visit          Pulmonary    Tracheobronchitis       Cardiac/Vascular    Paroxysmal atrial fibrillation - Primary       ID    COVID-19       Endocrine    Hyperthyroidism     Other Visit Diagnoses       MVP (mitral valve prolapse)        Ventricular bigeminy seen on cardiac monitor                No follow-ups on file.

## 2023-09-19 ENCOUNTER — PATIENT MESSAGE (OUTPATIENT)
Dept: FAMILY MEDICINE | Facility: CLINIC | Age: 63
End: 2023-09-19
Payer: COMMERCIAL

## 2023-10-09 ENCOUNTER — OFFICE VISIT (OUTPATIENT)
Dept: FAMILY MEDICINE | Facility: CLINIC | Age: 63
End: 2023-10-09
Payer: COMMERCIAL

## 2023-10-09 VITALS
DIASTOLIC BLOOD PRESSURE: 82 MMHG | OXYGEN SATURATION: 98 % | HEIGHT: 70 IN | SYSTOLIC BLOOD PRESSURE: 124 MMHG | WEIGHT: 174 LBS | HEART RATE: 74 BPM | BODY MASS INDEX: 24.91 KG/M2 | TEMPERATURE: 99 F

## 2023-10-09 DIAGNOSIS — J01.01 ACUTE RECURRENT MAXILLARY SINUSITIS: ICD-10-CM

## 2023-10-09 DIAGNOSIS — R05.1 ACUTE COUGH: ICD-10-CM

## 2023-10-09 DIAGNOSIS — J06.0 SORE THROAT AND LARYNGITIS: ICD-10-CM

## 2023-10-09 DIAGNOSIS — J34.89 NASAL CONGESTION WITH RHINORRHEA: Primary | ICD-10-CM

## 2023-10-09 DIAGNOSIS — R09.81 NASAL CONGESTION WITH RHINORRHEA: Primary | ICD-10-CM

## 2023-10-09 PROCEDURE — 3074F SYST BP LT 130 MM HG: CPT | Mod: S$GLB,,, | Performed by: NURSE PRACTITIONER

## 2023-10-09 PROCEDURE — 1159F MED LIST DOCD IN RCRD: CPT | Mod: S$GLB,,, | Performed by: NURSE PRACTITIONER

## 2023-10-09 PROCEDURE — 3074F PR MOST RECENT SYSTOLIC BLOOD PRESSURE < 130 MM HG: ICD-10-PCS | Mod: S$GLB,,, | Performed by: NURSE PRACTITIONER

## 2023-10-09 PROCEDURE — 3079F PR MOST RECENT DIASTOLIC BLOOD PRESSURE 80-89 MM HG: ICD-10-PCS | Mod: S$GLB,,, | Performed by: NURSE PRACTITIONER

## 2023-10-09 PROCEDURE — 99213 OFFICE O/P EST LOW 20 MIN: CPT | Mod: S$GLB,,, | Performed by: NURSE PRACTITIONER

## 2023-10-09 PROCEDURE — 3079F DIAST BP 80-89 MM HG: CPT | Mod: S$GLB,,, | Performed by: NURSE PRACTITIONER

## 2023-10-09 PROCEDURE — 1159F PR MEDICATION LIST DOCUMENTED IN MEDICAL RECORD: ICD-10-PCS | Mod: S$GLB,,, | Performed by: NURSE PRACTITIONER

## 2023-10-09 PROCEDURE — 3008F PR BODY MASS INDEX (BMI) DOCUMENTED: ICD-10-PCS | Mod: S$GLB,,, | Performed by: NURSE PRACTITIONER

## 2023-10-09 PROCEDURE — 99999 PR PBB SHADOW E&M-EST. PATIENT-LVL V: CPT | Mod: PBBFAC,,, | Performed by: NURSE PRACTITIONER

## 2023-10-09 PROCEDURE — 99999 PR PBB SHADOW E&M-EST. PATIENT-LVL V: ICD-10-PCS | Mod: PBBFAC,,, | Performed by: NURSE PRACTITIONER

## 2023-10-09 PROCEDURE — 99213 PR OFFICE/OUTPT VISIT, EST, LEVL III, 20-29 MIN: ICD-10-PCS | Mod: S$GLB,,, | Performed by: NURSE PRACTITIONER

## 2023-10-09 PROCEDURE — 3008F BODY MASS INDEX DOCD: CPT | Mod: S$GLB,,, | Performed by: NURSE PRACTITIONER

## 2023-10-09 RX ORDER — METHYLPREDNISOLONE 4 MG/1
TABLET ORAL
Qty: 21 EACH | Refills: 0 | Status: SHIPPED | OUTPATIENT
Start: 2023-10-09 | End: 2023-10-30

## 2023-10-09 RX ORDER — MULTIVITAMIN
1 TABLET ORAL DAILY
COMMUNITY

## 2023-10-09 RX ORDER — DOXYCYCLINE 100 MG/1
100 CAPSULE ORAL 2 TIMES DAILY
Qty: 20 CAPSULE | Refills: 0 | Status: SHIPPED | OUTPATIENT
Start: 2023-10-09 | End: 2023-11-28

## 2023-10-09 RX ORDER — BENZONATATE 200 MG/1
200 CAPSULE ORAL 3 TIMES DAILY PRN
Qty: 30 CAPSULE | Refills: 0 | Status: SHIPPED | OUTPATIENT
Start: 2023-10-09 | End: 2023-10-19

## 2023-10-09 RX ORDER — AA/PROT/LYSINE/METHIO/VIT C/B6 50-12.5 MG
TABLET ORAL
COMMUNITY

## 2023-11-28 ENCOUNTER — OFFICE VISIT (OUTPATIENT)
Dept: FAMILY MEDICINE | Facility: CLINIC | Age: 63
End: 2023-11-28
Payer: COMMERCIAL

## 2023-11-28 VITALS
SYSTOLIC BLOOD PRESSURE: 132 MMHG | BODY MASS INDEX: 24.91 KG/M2 | HEIGHT: 70 IN | RESPIRATION RATE: 16 BRPM | HEART RATE: 72 BPM | DIASTOLIC BLOOD PRESSURE: 76 MMHG | WEIGHT: 174 LBS

## 2023-11-28 DIAGNOSIS — L85.3 DRY SKIN: ICD-10-CM

## 2023-11-28 DIAGNOSIS — M81.0 AGE-RELATED OSTEOPOROSIS WITHOUT CURRENT PATHOLOGICAL FRACTURE: ICD-10-CM

## 2023-11-28 DIAGNOSIS — I48.0 PAROXYSMAL ATRIAL FIBRILLATION: ICD-10-CM

## 2023-11-28 DIAGNOSIS — Z13.220 ENCOUNTER FOR LIPID SCREENING FOR CARDIOVASCULAR DISEASE: ICD-10-CM

## 2023-11-28 DIAGNOSIS — Z13.1 SCREENING FOR DIABETES MELLITUS: ICD-10-CM

## 2023-11-28 DIAGNOSIS — E05.90 HYPERTHYROIDISM: ICD-10-CM

## 2023-11-28 DIAGNOSIS — F43.21 SITUATIONAL DEPRESSION: ICD-10-CM

## 2023-11-28 DIAGNOSIS — K21.9 GASTROESOPHAGEAL REFLUX DISEASE WITHOUT ESOPHAGITIS: ICD-10-CM

## 2023-11-28 DIAGNOSIS — Z13.6 ENCOUNTER FOR LIPID SCREENING FOR CARDIOVASCULAR DISEASE: ICD-10-CM

## 2023-11-28 DIAGNOSIS — J30.2 SEASONAL ALLERGIES: ICD-10-CM

## 2023-11-28 DIAGNOSIS — R53.83 FATIGUE, UNSPECIFIED TYPE: ICD-10-CM

## 2023-11-28 DIAGNOSIS — F41.0 PANIC DISORDER: Primary | ICD-10-CM

## 2023-11-28 DIAGNOSIS — J35.8 TONSILLITH: ICD-10-CM

## 2023-11-28 DIAGNOSIS — F51.01 PRIMARY INSOMNIA: ICD-10-CM

## 2023-11-28 PROBLEM — J40 TRACHEOBRONCHITIS: Status: RESOLVED | Noted: 2022-11-03 | Resolved: 2023-11-28

## 2023-11-28 PROCEDURE — 3008F PR BODY MASS INDEX (BMI) DOCUMENTED: ICD-10-PCS | Mod: S$GLB,,, | Performed by: STUDENT IN AN ORGANIZED HEALTH CARE EDUCATION/TRAINING PROGRAM

## 2023-11-28 PROCEDURE — 3078F PR MOST RECENT DIASTOLIC BLOOD PRESSURE < 80 MM HG: ICD-10-PCS | Mod: S$GLB,,, | Performed by: STUDENT IN AN ORGANIZED HEALTH CARE EDUCATION/TRAINING PROGRAM

## 2023-11-28 PROCEDURE — 3075F PR MOST RECENT SYSTOLIC BLOOD PRESS GE 130-139MM HG: ICD-10-PCS | Mod: S$GLB,,, | Performed by: STUDENT IN AN ORGANIZED HEALTH CARE EDUCATION/TRAINING PROGRAM

## 2023-11-28 PROCEDURE — 99214 PR OFFICE/OUTPT VISIT, EST, LEVL IV, 30-39 MIN: ICD-10-PCS | Mod: S$GLB,,, | Performed by: STUDENT IN AN ORGANIZED HEALTH CARE EDUCATION/TRAINING PROGRAM

## 2023-11-28 PROCEDURE — 99214 OFFICE O/P EST MOD 30 MIN: CPT | Mod: S$GLB,,, | Performed by: STUDENT IN AN ORGANIZED HEALTH CARE EDUCATION/TRAINING PROGRAM

## 2023-11-28 PROCEDURE — 3075F SYST BP GE 130 - 139MM HG: CPT | Mod: S$GLB,,, | Performed by: STUDENT IN AN ORGANIZED HEALTH CARE EDUCATION/TRAINING PROGRAM

## 2023-11-28 PROCEDURE — 3078F DIAST BP <80 MM HG: CPT | Mod: S$GLB,,, | Performed by: STUDENT IN AN ORGANIZED HEALTH CARE EDUCATION/TRAINING PROGRAM

## 2023-11-28 PROCEDURE — 99999 PR PBB SHADOW E&M-EST. PATIENT-LVL III: ICD-10-PCS | Mod: PBBFAC,,, | Performed by: STUDENT IN AN ORGANIZED HEALTH CARE EDUCATION/TRAINING PROGRAM

## 2023-11-28 PROCEDURE — 3008F BODY MASS INDEX DOCD: CPT | Mod: S$GLB,,, | Performed by: STUDENT IN AN ORGANIZED HEALTH CARE EDUCATION/TRAINING PROGRAM

## 2023-11-28 PROCEDURE — 99999 PR PBB SHADOW E&M-EST. PATIENT-LVL III: CPT | Mod: PBBFAC,,, | Performed by: STUDENT IN AN ORGANIZED HEALTH CARE EDUCATION/TRAINING PROGRAM

## 2023-11-28 RX ORDER — ALPRAZOLAM 0.25 MG/1
0.25 TABLET ORAL DAILY PRN
Qty: 30 TABLET | Refills: 0 | Status: SHIPPED | OUTPATIENT
Start: 2023-11-28

## 2023-11-28 RX ORDER — FAMOTIDINE 40 MG/1
40 TABLET, FILM COATED ORAL NIGHTLY
COMMUNITY
Start: 2023-11-01

## 2023-11-28 RX ORDER — HYDROCORTISONE 25 MG/G
CREAM TOPICAL
COMMUNITY
Start: 2023-10-18

## 2023-11-28 RX ORDER — AMMONIUM LACTATE 12 G/100G
CREAM TOPICAL
Qty: 385 G | Refills: 1 | Status: SHIPPED | OUTPATIENT
Start: 2023-11-28

## 2023-11-28 RX ORDER — AMMONIUM LACTATE 12 G/100G
LOTION TOPICAL 2 TIMES DAILY PRN
Qty: 396 G | Refills: 1 | Status: SHIPPED | OUTPATIENT
Start: 2023-11-28

## 2023-11-28 RX ORDER — TRAZODONE HYDROCHLORIDE 50 MG/1
50 TABLET ORAL NIGHTLY
Qty: 30 TABLET | Refills: 11 | Status: SHIPPED | OUTPATIENT
Start: 2023-11-28 | End: 2023-12-18

## 2023-11-28 RX ORDER — MONTELUKAST SODIUM 10 MG/1
10 TABLET ORAL DAILY
Qty: 90 TABLET | Refills: 3 | Status: SHIPPED | OUTPATIENT
Start: 2023-11-28

## 2023-11-28 NOTE — PROGRESS NOTES
Subjective:       Patient ID: Anthony Apple is a 63 y.o. female.    Chief Complaint: Well visit (Patient states she's here for her well visit. She has some labs with her today to discuss. )    Patient Active Problem List:     Mitral valve prolapse     Family history of colon cancer     Gastroesophageal reflux disease without esophagitis     Hyperthyroidism     Osteoporosis     Seasonal allergies     Panic disorder     Paroxysmal atrial fibrillation     COVID-19     Chondral defect of right patella     Situational depression    Current Outpatient Medications:  alendronate (FOSAMAX) 70 MG tablet, Fosamax 70 mg tablet  ALPRAZolam (XANAX) 0.25 MG tablet, Take 1 tablet (0.25 mg total) by mouth daily as needed for Anxiety.  ammonium lactate (LAC-HYDRIN) 12 % lotion, Apply topically 2 (two) times daily as needed for Dry Skin.  aspirin 81 mg Cap, Take 1 capsule by mouth every other day.  azelastine (ASTELIN) 137 mcg (0.1 %) nasal spray, 2 sprays (274 mcg total) by Nasal route 2 (two) times daily.  BEPREVE 1.5 % Drop, PLACE 1 DROP INTO OU BID  calcium carbonate-vitamin D3 (CALTRATE 600 PLUS D) 600 mg (1,500 mg)-800 unit Chew, Caltrate 600 plus D  600 mg (1,500 mg)-800 unit chewable tablet   Take 1 tablet twice a day by oral route.  clotrimazole-betamethasone 1-0.05% (LOTRISONE) cream, Apply topically 2 (two) times daily.  diphenhydrAMINE (BENADRYL) 25 mg capsule, Take 25 mg by mouth every 6 (six) hours as needed for Itching.  fluorometholone 0.1% (FML) 0.1 % DrpS, Place into both eyes.  fluticasone propionate (FLONASE) 50 mcg/actuation nasal spray, 1 spray (50 mcg total) by Each Nostril route once daily.  hydrocortisone 2.5 % cream, SMARTSIG:sparingly Rectally Twice Daily  hydrocortisone-pramoxine 2-1 % glpe,   magnesium oxide-Mg AA chelate (MG-PLUS-PROTEIN) 133 mg Tab, Take by mouth.  montelukast (SINGULAIR) 10 mg tablet, Take 1 tablet (10 mg total) by mouth once daily.  multivitamin (ONE DAILY MULTIVITAMIN) per tablet,  Take 1 tablet by mouth once daily.  omeprazole (PRILOSEC) 40 MG capsule, Take 1 capsule (40 mg total) by mouth daily as needed (reflux).  PEPCID 40 mg tablet, Take 40 mg by mouth every evening.  triamcinolone acetonide 0.5% (KENALOG) 0.5 % Crea, Apply topically 2 (two) times daily.  TYRVAYA 0.03 mg/spray sprm, 1 spray by Each Nostril route 2 (two) times daily.  urea 40 % Lotn lotion,   urea-hydrophilic cream Lotn, Apply 1 application topically once daily.  VITAMIN D3 25 mcg (1,000 unit) capsule, Take 1,000 Units by mouth Daily.  flecainide (TAMBOCOR) 100 MG Tab, Take 1 tablet (100 mg total) by mouth once daily.  metoprolol succinate (TOPROL-XL) 25 MG 24 hr tablet, Take 0.5 tablets (12.5 mg total) by mouth 2 (two) times daily.    No current facility-administered medications for this visit.      Overall doing well  She moved this year  She had cataract surgery this year  She has had an egd for her reflux and is doing well.  She follows with endocrinology    No new complaints.       Review of Systems   Constitutional:  Positive for fatigue. Negative for activity change and appetite change.   Respiratory:  Negative for shortness of breath.    Cardiovascular:  Negative for chest pain.   Gastrointestinal:  Negative for abdominal pain and anal bleeding.   Genitourinary:  Negative for dysuria.   Integumentary:  Negative for rash.   Psychiatric/Behavioral:  Positive for sleep disturbance. Negative for dysphoric mood. The patient is not nervous/anxious.          Objective:      Physical Exam  Constitutional:       General: She is not in acute distress.     Appearance: Normal appearance. She is not ill-appearing.   Eyes:      Conjunctiva/sclera: Conjunctivae normal.   Cardiovascular:      Rate and Rhythm: Normal rate and regular rhythm.      Heart sounds: Normal heart sounds. No murmur heard.  Pulmonary:      Effort: Pulmonary effort is normal. No respiratory distress.      Breath sounds: Normal breath sounds.    Musculoskeletal:      Right lower leg: No edema.      Left lower leg: No edema.   Skin:     General: Skin is warm and dry.   Neurological:      Mental Status: She is alert. Mental status is at baseline.      Gait: Gait normal.   Psychiatric:         Mood and Affect: Mood normal.         Behavior: Behavior normal.         Thought Content: Thought content normal.         Judgment: Judgment normal.         Assessment:       1. Panic disorder    2. Situational depression    3. Seasonal allergies    4. Paroxysmal atrial fibrillation    5. Hyperthyroidism    6. Gastroesophageal reflux disease without esophagitis    7. Age-related osteoporosis without current pathological fracture    8. Primary insomnia    9. Fatigue, unspecified type    10. Encounter for lipid screening for cardiovascular disease    11. Screening for diabetes mellitus    12. Tonsillith    13. Dry skin        Plan:       Problem List Items Addressed This Visit          Psychiatric    Panic disorder - Primary     Stable. Continue current medications and regular followup.  Uses prn xanax for panic           Situational depression     Stable. Continue current medications and regular followup.              ENT    Seasonal allergies     Stable. Continue current medications and regular followup.           Tonsillith     Consider ENT evaluation            Cardiac/Vascular    Paroxysmal atrial fibrillation     Stable and doing well            Endocrine    Hyperthyroidism     Lab Results   Component Value Date    TSH 1.870 10/07/2022   On metoprolol. Stable and feeling well.          Relevant Orders    TSH    Osteoporosis     Seeing endocrinology.  Getting yearly dexa scan.  On fosamax.            GI    Gastroesophageal reflux disease without esophagitis     Stable. Continue current medications and regular followup.            Other Visit Diagnoses       Primary insomnia        Relevant Medications    traZODone (DESYREL) 50 MG tablet    Fatigue, unspecified type         Relevant Orders    CBC Auto Differential    Comprehensive Metabolic Panel    Encounter for lipid screening for cardiovascular disease        Relevant Orders    Lipid Panel    Screening for diabetes mellitus        Relevant Orders    Hemoglobin A1C    Dry skin        Relevant Medications    ammonium lactate (LAC-HYDRIN) 12 % lotion    urea-hydrophilic cream Lotn             reviewed and no red flags

## 2023-11-28 NOTE — ASSESSMENT & PLAN NOTE
Lab Results   Component Value Date    TSH 1.870 10/07/2022     On metoprolol. Stable and feeling well.

## 2023-11-30 ENCOUNTER — LAB VISIT (OUTPATIENT)
Dept: LAB | Facility: HOSPITAL | Age: 63
End: 2023-11-30
Attending: STUDENT IN AN ORGANIZED HEALTH CARE EDUCATION/TRAINING PROGRAM
Payer: COMMERCIAL

## 2023-11-30 DIAGNOSIS — Z13.1 SCREENING FOR DIABETES MELLITUS: ICD-10-CM

## 2023-11-30 DIAGNOSIS — Z13.220 ENCOUNTER FOR LIPID SCREENING FOR CARDIOVASCULAR DISEASE: ICD-10-CM

## 2023-11-30 DIAGNOSIS — R53.83 FATIGUE, UNSPECIFIED TYPE: ICD-10-CM

## 2023-11-30 DIAGNOSIS — Z13.6 ENCOUNTER FOR LIPID SCREENING FOR CARDIOVASCULAR DISEASE: ICD-10-CM

## 2023-11-30 DIAGNOSIS — E05.90 HYPERTHYROIDISM: ICD-10-CM

## 2023-11-30 LAB
ALBUMIN SERPL BCP-MCNC: 4.1 G/DL (ref 3.5–5.2)
ALP SERPL-CCNC: 54 U/L (ref 55–135)
ALT SERPL W/O P-5'-P-CCNC: 25 U/L (ref 10–44)
ANION GAP SERPL CALC-SCNC: 8 MMOL/L (ref 8–16)
AST SERPL-CCNC: 28 U/L (ref 10–40)
BASOPHILS # BLD AUTO: 0.01 K/UL (ref 0–0.2)
BASOPHILS NFR BLD: 0.2 % (ref 0–1.9)
BILIRUB SERPL-MCNC: 0.8 MG/DL (ref 0.1–1)
BUN SERPL-MCNC: 20 MG/DL (ref 8–23)
CALCIUM SERPL-MCNC: 9.6 MG/DL (ref 8.7–10.5)
CHLORIDE SERPL-SCNC: 106 MMOL/L (ref 95–110)
CHOLEST SERPL-MCNC: 169 MG/DL (ref 120–199)
CHOLEST/HDLC SERPL: 3.1 {RATIO} (ref 2–5)
CO2 SERPL-SCNC: 25 MMOL/L (ref 23–29)
CREAT SERPL-MCNC: 0.9 MG/DL (ref 0.5–1.4)
DIFFERENTIAL METHOD: ABNORMAL
EOSINOPHIL # BLD AUTO: 0.1 K/UL (ref 0–0.5)
EOSINOPHIL NFR BLD: 3.2 % (ref 0–8)
ERYTHROCYTE [DISTWIDTH] IN BLOOD BY AUTOMATED COUNT: 12.6 % (ref 11.5–14.5)
EST. GFR  (NO RACE VARIABLE): >60 ML/MIN/1.73 M^2
ESTIMATED AVG GLUCOSE: 100 MG/DL (ref 68–131)
GLUCOSE SERPL-MCNC: 90 MG/DL (ref 70–110)
HBA1C MFR BLD: 5.1 % (ref 4–5.6)
HCT VFR BLD AUTO: 39.2 % (ref 37–48.5)
HDLC SERPL-MCNC: 55 MG/DL (ref 40–75)
HDLC SERPL: 32.5 % (ref 20–50)
HGB BLD-MCNC: 13.2 G/DL (ref 12–16)
IMM GRANULOCYTES # BLD AUTO: 0.01 K/UL (ref 0–0.04)
IMM GRANULOCYTES NFR BLD AUTO: 0.2 % (ref 0–0.5)
LDLC SERPL CALC-MCNC: 102.2 MG/DL (ref 63–159)
LYMPHOCYTES # BLD AUTO: 1.5 K/UL (ref 1–4.8)
LYMPHOCYTES NFR BLD: 34.5 % (ref 18–48)
MCH RBC QN AUTO: 32.1 PG (ref 27–31)
MCHC RBC AUTO-ENTMCNC: 33.7 G/DL (ref 32–36)
MCV RBC AUTO: 95 FL (ref 82–98)
MONOCYTES # BLD AUTO: 0.3 K/UL (ref 0.3–1)
MONOCYTES NFR BLD: 7.7 % (ref 4–15)
NEUTROPHILS # BLD AUTO: 2.4 K/UL (ref 1.8–7.7)
NEUTROPHILS NFR BLD: 54.2 % (ref 38–73)
NONHDLC SERPL-MCNC: 114 MG/DL
NRBC BLD-RTO: 0 /100 WBC
PLATELET # BLD AUTO: 171 K/UL (ref 150–450)
PMV BLD AUTO: 9.7 FL (ref 9.2–12.9)
POTASSIUM SERPL-SCNC: 4.3 MMOL/L (ref 3.5–5.1)
PROT SERPL-MCNC: 7.2 G/DL (ref 6–8.4)
RBC # BLD AUTO: 4.11 M/UL (ref 4–5.4)
SODIUM SERPL-SCNC: 139 MMOL/L (ref 136–145)
TRIGL SERPL-MCNC: 59 MG/DL (ref 30–150)
TSH SERPL DL<=0.005 MIU/L-ACNC: 1.54 UIU/ML (ref 0.4–4)
WBC # BLD AUTO: 4.44 K/UL (ref 3.9–12.7)

## 2023-11-30 PROCEDURE — 84443 ASSAY THYROID STIM HORMONE: CPT | Performed by: STUDENT IN AN ORGANIZED HEALTH CARE EDUCATION/TRAINING PROGRAM

## 2023-11-30 PROCEDURE — 83036 HEMOGLOBIN GLYCOSYLATED A1C: CPT | Performed by: STUDENT IN AN ORGANIZED HEALTH CARE EDUCATION/TRAINING PROGRAM

## 2023-11-30 PROCEDURE — 80053 COMPREHEN METABOLIC PANEL: CPT | Performed by: STUDENT IN AN ORGANIZED HEALTH CARE EDUCATION/TRAINING PROGRAM

## 2023-11-30 PROCEDURE — 80061 LIPID PANEL: CPT | Performed by: STUDENT IN AN ORGANIZED HEALTH CARE EDUCATION/TRAINING PROGRAM

## 2023-11-30 PROCEDURE — 36415 COLL VENOUS BLD VENIPUNCTURE: CPT | Performed by: STUDENT IN AN ORGANIZED HEALTH CARE EDUCATION/TRAINING PROGRAM

## 2023-11-30 PROCEDURE — 85025 COMPLETE CBC W/AUTO DIFF WBC: CPT | Performed by: STUDENT IN AN ORGANIZED HEALTH CARE EDUCATION/TRAINING PROGRAM

## 2023-12-01 DIAGNOSIS — M81.0 SENILE OSTEOPOROSIS: Primary | ICD-10-CM

## 2023-12-07 ENCOUNTER — PATIENT MESSAGE (OUTPATIENT)
Dept: FAMILY MEDICINE | Facility: CLINIC | Age: 63
End: 2023-12-07
Payer: COMMERCIAL

## 2023-12-18 ENCOUNTER — OFFICE VISIT (OUTPATIENT)
Dept: FAMILY MEDICINE | Facility: CLINIC | Age: 63
End: 2023-12-18
Payer: COMMERCIAL

## 2023-12-18 VITALS
OXYGEN SATURATION: 97 % | SYSTOLIC BLOOD PRESSURE: 128 MMHG | WEIGHT: 175.81 LBS | RESPIRATION RATE: 16 BRPM | HEART RATE: 79 BPM | BODY MASS INDEX: 25.17 KG/M2 | HEIGHT: 70 IN | DIASTOLIC BLOOD PRESSURE: 82 MMHG

## 2023-12-18 DIAGNOSIS — Z12.31 SCREENING MAMMOGRAM FOR BREAST CANCER: ICD-10-CM

## 2023-12-18 DIAGNOSIS — M54.2 NECK PAIN: ICD-10-CM

## 2023-12-18 DIAGNOSIS — M62.838 MUSCLE SPASM: Primary | ICD-10-CM

## 2023-12-18 PROCEDURE — 3044F HG A1C LEVEL LT 7.0%: CPT | Mod: S$GLB,,, | Performed by: STUDENT IN AN ORGANIZED HEALTH CARE EDUCATION/TRAINING PROGRAM

## 2023-12-18 PROCEDURE — 3008F PR BODY MASS INDEX (BMI) DOCUMENTED: ICD-10-PCS | Mod: S$GLB,,, | Performed by: STUDENT IN AN ORGANIZED HEALTH CARE EDUCATION/TRAINING PROGRAM

## 2023-12-18 PROCEDURE — 3044F PR MOST RECENT HEMOGLOBIN A1C LEVEL <7.0%: ICD-10-PCS | Mod: S$GLB,,, | Performed by: STUDENT IN AN ORGANIZED HEALTH CARE EDUCATION/TRAINING PROGRAM

## 2023-12-18 PROCEDURE — 3079F PR MOST RECENT DIASTOLIC BLOOD PRESSURE 80-89 MM HG: ICD-10-PCS | Mod: S$GLB,,, | Performed by: STUDENT IN AN ORGANIZED HEALTH CARE EDUCATION/TRAINING PROGRAM

## 2023-12-18 PROCEDURE — 3074F PR MOST RECENT SYSTOLIC BLOOD PRESSURE < 130 MM HG: ICD-10-PCS | Mod: S$GLB,,, | Performed by: STUDENT IN AN ORGANIZED HEALTH CARE EDUCATION/TRAINING PROGRAM

## 2023-12-18 PROCEDURE — 3008F BODY MASS INDEX DOCD: CPT | Mod: S$GLB,,, | Performed by: STUDENT IN AN ORGANIZED HEALTH CARE EDUCATION/TRAINING PROGRAM

## 2023-12-18 PROCEDURE — 3074F SYST BP LT 130 MM HG: CPT | Mod: S$GLB,,, | Performed by: STUDENT IN AN ORGANIZED HEALTH CARE EDUCATION/TRAINING PROGRAM

## 2023-12-18 PROCEDURE — 99999 PR PBB SHADOW E&M-EST. PATIENT-LVL III: ICD-10-PCS | Mod: PBBFAC,,, | Performed by: STUDENT IN AN ORGANIZED HEALTH CARE EDUCATION/TRAINING PROGRAM

## 2023-12-18 PROCEDURE — 99213 PR OFFICE/OUTPT VISIT, EST, LEVL III, 20-29 MIN: ICD-10-PCS | Mod: S$GLB,,, | Performed by: STUDENT IN AN ORGANIZED HEALTH CARE EDUCATION/TRAINING PROGRAM

## 2023-12-18 PROCEDURE — 3079F DIAST BP 80-89 MM HG: CPT | Mod: S$GLB,,, | Performed by: STUDENT IN AN ORGANIZED HEALTH CARE EDUCATION/TRAINING PROGRAM

## 2023-12-18 PROCEDURE — 99999 PR PBB SHADOW E&M-EST. PATIENT-LVL III: CPT | Mod: PBBFAC,,, | Performed by: STUDENT IN AN ORGANIZED HEALTH CARE EDUCATION/TRAINING PROGRAM

## 2023-12-18 PROCEDURE — 99213 OFFICE O/P EST LOW 20 MIN: CPT | Mod: S$GLB,,, | Performed by: STUDENT IN AN ORGANIZED HEALTH CARE EDUCATION/TRAINING PROGRAM

## 2023-12-18 RX ORDER — CYCLOBENZAPRINE HCL 10 MG
10 TABLET ORAL NIGHTLY
Qty: 30 TABLET | Refills: 0 | Status: SHIPPED | OUTPATIENT
Start: 2023-12-18

## 2023-12-18 NOTE — PROGRESS NOTES
Subjective:       Patient ID: Anthony Apple is a 63 y.o. female.    Chief Complaint: Neck Pain (Pt got crook in her neck 1 wk ago, pt states that it is better but would like a muscle relaxer for when it happens again)    She is having spasm in her neck recently and in her lower back  This flaired in the last few weeks  Better today but still present  Hard time turning her head  Headache  fatigue      Review of Systems   Constitutional:  Negative for activity change and appetite change.   Respiratory:  Negative for shortness of breath.    Cardiovascular:  Negative for chest pain.   Gastrointestinal:  Negative for abdominal pain and anal bleeding.   Genitourinary:  Negative for dysuria.   Integumentary:  Negative for rash.   Neurological:  Positive for headaches.   Psychiatric/Behavioral:  Negative for dysphoric mood and sleep disturbance. The patient is not nervous/anxious.          Objective:      Physical Exam  Constitutional:       General: She is not in acute distress.     Appearance: Normal appearance. She is not ill-appearing.   Eyes:      Conjunctiva/sclera: Conjunctivae normal.   Cardiovascular:      Rate and Rhythm: Normal rate and regular rhythm.      Heart sounds: Normal heart sounds. No murmur heard.  Pulmonary:      Effort: Pulmonary effort is normal. No respiratory distress.      Breath sounds: Normal breath sounds.   Musculoskeletal:      Right lower leg: No edema.      Left lower leg: No edema.   Skin:     General: Skin is warm and dry.   Neurological:      Mental Status: She is alert. Mental status is at baseline.      Gait: Gait normal.   Psychiatric:         Mood and Affect: Mood normal.         Behavior: Behavior normal.         Thought Content: Thought content normal.         Judgment: Judgment normal.         Assessment:       1. Muscle spasm    2. Neck pain    3. Screening mammogram for breast cancer        Plan:       Problem List Items Addressed This Visit    None  Visit Diagnoses        Muscle spasm    -  Primary    ncek and low back intermittently    Relevant Medications    cyclobenzaprine (FLEXERIL) 10 MG tablet    Other Relevant Orders    Ambulatory referral/consult to Physical/Occupational Therapy    Neck pain        Relevant Orders    Ambulatory referral/consult to Physical/Occupational Therapy    Screening mammogram for breast cancer        Relevant Orders    Mammo Digital Screening Bilat w/ [facility]

## 2024-01-11 ENCOUNTER — OFFICE VISIT (OUTPATIENT)
Dept: CARDIOLOGY | Facility: CLINIC | Age: 64
End: 2024-01-11
Payer: COMMERCIAL

## 2024-01-11 VITALS
HEIGHT: 70 IN | BODY MASS INDEX: 25.2 KG/M2 | HEART RATE: 69 BPM | WEIGHT: 176 LBS | OXYGEN SATURATION: 98 % | RESPIRATION RATE: 16 BRPM

## 2024-01-11 DIAGNOSIS — F41.0 PANIC DISORDER: ICD-10-CM

## 2024-01-11 DIAGNOSIS — R00.8 VENTRICULAR BIGEMINY SEEN ON CARDIAC MONITOR: ICD-10-CM

## 2024-01-11 DIAGNOSIS — I48.0 PAROXYSMAL ATRIAL FIBRILLATION: Primary | ICD-10-CM

## 2024-01-11 DIAGNOSIS — E05.90 HYPERTHYROIDISM: ICD-10-CM

## 2024-01-11 DIAGNOSIS — I48.0 PAF (PAROXYSMAL ATRIAL FIBRILLATION): ICD-10-CM

## 2024-01-11 DIAGNOSIS — I34.1 MVP (MITRAL VALVE PROLAPSE): ICD-10-CM

## 2024-01-11 DIAGNOSIS — K21.9 GASTROESOPHAGEAL REFLUX DISEASE WITHOUT ESOPHAGITIS: ICD-10-CM

## 2024-01-11 PROCEDURE — 99999 PR PBB SHADOW E&M-EST. PATIENT-LVL V: CPT | Mod: PBBFAC,,, | Performed by: INTERNAL MEDICINE

## 2024-01-11 PROCEDURE — 1159F MED LIST DOCD IN RCRD: CPT | Mod: CPTII,S$GLB,, | Performed by: INTERNAL MEDICINE

## 2024-01-11 PROCEDURE — 1160F RVW MEDS BY RX/DR IN RCRD: CPT | Mod: CPTII,S$GLB,, | Performed by: INTERNAL MEDICINE

## 2024-01-11 PROCEDURE — 93000 ELECTROCARDIOGRAM COMPLETE: CPT | Mod: S$GLB,,, | Performed by: INTERNAL MEDICINE

## 2024-01-11 PROCEDURE — 3008F BODY MASS INDEX DOCD: CPT | Mod: CPTII,S$GLB,, | Performed by: INTERNAL MEDICINE

## 2024-01-11 PROCEDURE — 99214 OFFICE O/P EST MOD 30 MIN: CPT | Mod: S$GLB,,, | Performed by: INTERNAL MEDICINE

## 2024-01-11 RX ORDER — FLECAINIDE ACETATE 100 MG/1
100 TABLET ORAL DAILY
Qty: 30 TABLET | Refills: 11 | Status: SHIPPED | OUTPATIENT
Start: 2024-01-11 | End: 2025-01-10

## 2024-01-11 RX ORDER — METOPROLOL SUCCINATE 25 MG/1
12.5 TABLET, EXTENDED RELEASE ORAL 2 TIMES DAILY
Qty: 90 TABLET | Refills: 3 | Status: SHIPPED | OUTPATIENT
Start: 2024-01-11 | End: 2025-01-10

## 2024-01-11 NOTE — PROGRESS NOTES
Subjective:    Patient ID:  Anthony Apple is a 63 y.o. female     Chief Complaint   Patient presents with    Atrial Fibrillation       HPI:  Ms Anthony Apple is a 63 y.o. female is here for follow-up.    Patient has been doing well except that she has significant pain in the left side of the neck.  She is unable to turn her head around.    Patient had been busy all through the year and she had multiple issues and she had cough and congestion with which she found that her heart rate would go up when she was sick.    She also had indigestion irritation in the stomach and she underwent endoscopy.  And she is being treated for the same.  And lately because of her busy schedule patient was not able to do her exercises on regular basis.    Recently she found that she does have occasional skipped beats and fluttering sensation in the chest.    Patient denies any chest pain or tightness or heaviness her breathing is good denies any shortness a breath or difficulty in breathing denies any loss of consciousness.    She is taking all her medications regularly.        Review of patient's allergies indicates:   Allergen Reactions    Cefdinir Anaphylaxis, Rash and Shortness Of Breath    Nitrofurantoin macrocrystal Itching    Sulfamethoxazole-trimethoprim Itching, Other (See Comments) and Rash       Past Medical History:   Diagnosis Date    Atrial fibrillation     GERD (gastroesophageal reflux disease)     Hyperthyroidism     Osteoporosis     Panic attack     Seasonal allergies      Past Surgical History:   Procedure Laterality Date    none       Social History     Tobacco Use    Smoking status: Never    Smokeless tobacco: Never   Substance Use Topics    Alcohol use: Yes     Alcohol/week: 2.0 standard drinks of alcohol     Types: 1 Glasses of wine, 1 Cans of beer per week     Comment: on occasion     Drug use: Not Currently     Family History   Problem Relation Age of Onset    Stroke Mother     Arthritis Mother     Thyroid  disease Mother     Colon cancer Father     Atrial fibrillation Father     Thyroid disease Father         Review of Systems:   Constitution: Negative for diaphoresis and fever.   HEENT: Negative for nosebleeds.    Cardiovascular: Negative for chest pain       No dyspnea on exertion       No leg swelling        No palpitations  Respiratory: Negative for shortness of breath and wheezing.    Hematologic/Lymphatic: Negative for bleeding problem. Does not bruise/bleed easily.   Skin: Negative for color change and rash.   Musculoskeletal: Negative for falls and neck myalgias.   Gastrointestinal: Negative for hematemesis and hematochezia.   Genitourinary: Negative for hematuria.   Neurological: Negative for dizziness and light-headedness.   Psychiatric/Behavioral: Negative for altered mental status and memory loss.          Objective:        Vitals:    01/11/24 1151   BP: (P) 120/70   Pulse: 69   Resp: 16       Lab Results   Component Value Date    WBC 4.44 11/30/2023    HGB 13.2 11/30/2023    HCT 39.2 11/30/2023     11/30/2023    CHOL 169 11/30/2023    TRIG 59 11/30/2023    HDL 55 11/30/2023    ALT 25 11/30/2023    AST 28 11/30/2023     11/30/2023    K 4.3 11/30/2023     11/30/2023    CREATININE 0.9 11/30/2023    BUN 20 11/30/2023    CO2 25 11/30/2023    TSH 1.540 11/30/2023    HGBA1C 5.1 11/30/2023        ECHOCARDIOGRAM RESULTS  Results for orders placed during the hospital encounter of 03/18/22    Echo    Interpretation Summary  · The left ventricle is normal in size with concentric remodeling and normal systolic function.  · The estimated ejection fraction is 60%.  · Normal left ventricular diastolic function.  · Normal right ventricular size with normal right ventricular systolic function.  · Mild tricuspid regurgitation.        CURRENT/PREVIOUS VISIT EKG  Results for orders placed or performed in visit on 06/21/23   IN OFFICE EKG 12-LEAD (to [place])    Collection Time: 06/21/23  8:32 AM    Narrative     Test Reason : I48.0,    Vent. Rate : 066 BPM     Atrial Rate : 066 BPM     P-R Int : 146 ms          QRS Dur : 098 ms      QT Int : 382 ms       P-R-T Axes : 011 025 048 degrees     QTc Int : 400 ms    Normal sinus rhythm  Normal ECG  When compared with ECG of 20-JAN-2022 15:09,  Borderline criteria for Anterior infarct are no longer Present  Confirmed by Kurt Koo MD ([id number]) on 6/27/2023 12:56:09 PM    Referred By:             Confirmed By:Kurt Koo MD     No valid procedures specified.   Results for orders placed during the hospital encounter of 03/18/22    Exercise Stress - EKG    Interpretation Summary    The EKG portion of this study is negative for ischemia.    The patient reported no chest pain during the stress test.    The blood pressure response to stress was normal.    There were no arrhythmias during stress.    Patient exercised on a Edmond protocol for 5 minutes and 24 seconds and attained a maximum heart rate of 167 beats per minute which is 102% of the maximum predicted heart rate and attained 7.1 METS.      Physical Exam:  CONSTITUTIONAL: No fever, no chills  HEENT: Normocephalic, atraumatic,pupils reactive to light                 NECK:  No JVD no carotid bruit  CVS: S1S2+, RRR, systolic murmurs,   LUNGS: Clear  ABDOMEN: Soft, NT, BS+  EXTREMITIES: No cyanosis, edema  : No roman catheter  NEURO: AAO X 3  PSY: Normal affect      Medication List with Changes/Refills   Current Medications    ALENDRONATE (FOSAMAX) 70 MG TABLET    Fosamax 70 mg tablet    ALPRAZOLAM (XANAX) 0.25 MG TABLET    Take 1 tablet (0.25 mg total) by mouth daily as needed for Anxiety.    AMMONIUM LACTATE (LAC-HYDRIN) 12 % LOTION    Apply topically 2 (two) times daily as needed for Dry Skin.    AMMONIUM LACTATE 12 % CREA    Apply twice daily if needed for dry skin    ASPIRIN 81 MG CAP    Take 1 capsule by mouth every other day.    AZELASTINE (ASTELIN) 137 MCG (0.1 %) NASAL SPRAY    2 sprays (274 mcg total) by Nasal route  2 (two) times daily.    BEPREVE 1.5 % DROP    PLACE 1 DROP INTO OU BID    CALCIUM CARBONATE-VITAMIN D3 (CALTRATE 600 PLUS D) 600 MG (1,500 MG)-800 UNIT CHEW    Caltrate 600 plus D  600 mg (1,500 mg)-800 unit chewable tablet   Take 1 tablet twice a day by oral route.    CLOTRIMAZOLE-BETAMETHASONE 1-0.05% (LOTRISONE) CREAM    Apply topically 2 (two) times daily.    CYCLOBENZAPRINE (FLEXERIL) 10 MG TABLET    Take 1 tablet (10 mg total) by mouth every evening.    DIPHENHYDRAMINE (BENADRYL) 25 MG CAPSULE    Take 25 mg by mouth every 6 (six) hours as needed for Itching.    FLECAINIDE (TAMBOCOR) 100 MG TAB    Take 1 tablet (100 mg total) by mouth once daily.    FLUOROMETHOLONE 0.1% (FML) 0.1 % DRPS    Place into both eyes.    FLUTICASONE PROPIONATE (FLONASE) 50 MCG/ACTUATION NASAL SPRAY    1 spray (50 mcg total) by Each Nostril route once daily.    HYDROCORTISONE 2.5 % CREAM    SMARTSIG:sparingly Rectally Twice Daily    HYDROCORTISONE-PRAMOXINE 2-1 % GLPE        MAGNESIUM OXIDE-MG AA CHELATE (MG-PLUS-PROTEIN) 133 MG TAB    Take by mouth.    METOPROLOL SUCCINATE (TOPROL-XL) 25 MG 24 HR TABLET    Take 0.5 tablets (12.5 mg total) by mouth 2 (two) times daily.    MONTELUKAST (SINGULAIR) 10 MG TABLET    Take 1 tablet (10 mg total) by mouth once daily.    MULTIVITAMIN (ONE DAILY MULTIVITAMIN) PER TABLET    Take 1 tablet by mouth once daily.    PEPCID 40 MG TABLET    Take 40 mg by mouth every evening.    TRIAMCINOLONE ACETONIDE 0.5% (KENALOG) 0.5 % CREA    Apply topically 2 (two) times daily.    TYRVAYA 0.03 MG/SPRAY SPRM    1 spray by Each Nostril route 2 (two) times daily.    UREA 40 % LOTN LOTION        UREA-HYDROPHILIC CREAM LOTN    Apply 1 application  topically once daily.    VITAMIN D3 25 MCG (1,000 UNIT) CAPSULE    Take 1,000 Units by mouth Daily.             Assessment:       1. Paroxysmal atrial fibrillation    2. Hyperthyroidism    3. MVP (mitral valve prolapse)    4. Ventricular bigeminy seen on cardiac monitor    5.  Gastroesophageal reflux disease without esophagitis    6. Panic disorder         Plan:   1. Paroxysmal atrial fibrillation   Patient is currently in sinus rhythm.  Has not had any other episodes.    Her chads Vasc score is 0.    Patient is currently on aspirin continue the same.  2. Mitral valve prolapse   Patient is stable at the present time.  Intermittently she does have palpitations.    3. Ventricular bigeminy   Patient has not had any ventricular bigeminy on the EKG.  However she does have occasional PACs.    She is on metoprolol succinate 12.5 mg p.o. b.i.d. and it is helping her.   She is also on Tambocor 100 mg p.o. daily continue the same and she is also on metoprolol succinate 12.5 mg b.i.d..  Continue the same medication.  4. Anxiety and panic disorder   Patient does take Xanax as needed.  Continue the same.  5.  EKG   Reviewed EKG independently patient is in sinus rhythm with occasional premature atrial complexes with a heart rate of 69 beats per minute otherwise essentially normal EKG.    6. Patient to continue current management and I will see her back in the office in 6 months time.  7. Mixed hyperlipidemia   Patient is currently doing very well she is her total cholesterol is 169 HDL is 55 LDL is 102 and triglycerides of 59.  8. Patient to follow-up with her primary physician.                 Problem List Items Addressed This Visit          Psychiatric    Panic disorder       Cardiac/Vascular    Paroxysmal atrial fibrillation - Primary    Relevant Orders    IN OFFICE EKG 12-LEAD (to [facility])       Endocrine    Hyperthyroidism       GI    Gastroesophageal reflux disease without esophagitis     Other Visit Diagnoses       MVP (mitral valve prolapse)        Ventricular bigeminy seen on cardiac monitor                No follow-ups on file.

## 2024-02-09 ENCOUNTER — OFFICE VISIT (OUTPATIENT)
Dept: FAMILY MEDICINE | Facility: CLINIC | Age: 64
End: 2024-02-09
Payer: COMMERCIAL

## 2024-02-09 VITALS
HEART RATE: 82 BPM | OXYGEN SATURATION: 99 % | TEMPERATURE: 99 F | RESPIRATION RATE: 18 BRPM | SYSTOLIC BLOOD PRESSURE: 118 MMHG | HEIGHT: 70 IN | DIASTOLIC BLOOD PRESSURE: 88 MMHG | WEIGHT: 176.69 LBS | BODY MASS INDEX: 25.3 KG/M2

## 2024-02-09 DIAGNOSIS — J06.9 UPPER RESPIRATORY TRACT INFECTION, UNSPECIFIED TYPE: Primary | ICD-10-CM

## 2024-02-09 DIAGNOSIS — R50.9 FEVER, UNSPECIFIED FEVER CAUSE: ICD-10-CM

## 2024-02-09 DIAGNOSIS — R68.83 CHILLS: ICD-10-CM

## 2024-02-09 DIAGNOSIS — R39.198 DIFFICULTY URINATING: ICD-10-CM

## 2024-02-09 LAB
BILIRUBIN, UA POC OHS: NEGATIVE
BLOOD, UA POC OHS: ABNORMAL
CLARITY, UA POC OHS: CLEAR
COLOR, UA POC OHS: YELLOW
CTP QC/QA: YES
CTP QC/QA: YES
GLUCOSE, UA POC OHS: NEGATIVE
KETONES, UA POC OHS: NEGATIVE
LEUKOCYTES, UA POC OHS: ABNORMAL
NITRITE, UA POC OHS: NEGATIVE
PH, UA POC OHS: 7
POC MOLECULAR INFLUENZA A AGN: NEGATIVE
POC MOLECULAR INFLUENZA B AGN: NEGATIVE
PROTEIN, UA POC OHS: ABNORMAL
SARS-COV-2 RDRP RESP QL NAA+PROBE: NEGATIVE
SPECIFIC GRAVITY, UA POC OHS: 1.02
UROBILINOGEN, UA POC OHS: 0.2

## 2024-02-09 PROCEDURE — 81003 URINALYSIS AUTO W/O SCOPE: CPT | Mod: QW,S$GLB,, | Performed by: FAMILY MEDICINE

## 2024-02-09 PROCEDURE — 1159F MED LIST DOCD IN RCRD: CPT | Mod: S$GLB,,, | Performed by: FAMILY MEDICINE

## 2024-02-09 PROCEDURE — 87635 SARS-COV-2 COVID-19 AMP PRB: CPT | Mod: QW,S$GLB,, | Performed by: FAMILY MEDICINE

## 2024-02-09 PROCEDURE — 99213 OFFICE O/P EST LOW 20 MIN: CPT | Mod: S$GLB,,, | Performed by: FAMILY MEDICINE

## 2024-02-09 PROCEDURE — 99999 PR PBB SHADOW E&M-EST. PATIENT-LVL V: CPT | Mod: PBBFAC,,, | Performed by: FAMILY MEDICINE

## 2024-02-09 PROCEDURE — 3079F DIAST BP 80-89 MM HG: CPT | Mod: S$GLB,,, | Performed by: FAMILY MEDICINE

## 2024-02-09 PROCEDURE — 87502 INFLUENZA DNA AMP PROBE: CPT | Mod: QW,S$GLB,, | Performed by: FAMILY MEDICINE

## 2024-02-09 PROCEDURE — 3008F BODY MASS INDEX DOCD: CPT | Mod: S$GLB,,, | Performed by: FAMILY MEDICINE

## 2024-02-09 PROCEDURE — 3074F SYST BP LT 130 MM HG: CPT | Mod: S$GLB,,, | Performed by: FAMILY MEDICINE

## 2024-02-09 PROCEDURE — 87086 URINE CULTURE/COLONY COUNT: CPT | Performed by: FAMILY MEDICINE

## 2024-02-09 NOTE — PROGRESS NOTES
Subjective:       Patient ID: Anthony Apple is a 63 y.o. female.    Chief Complaint: Generalized Body Aches (Aches and chills for about 3 days. )    Aches/pain and chills for about 2 days.   She also has some upper respiratory symptoms.   Did have a headache last night  Weepy eyes  Last night she felt feverish and woke up sweating.       Review of Systems   Constitutional:  Positive for chills and fever. Negative for activity change, appetite change and fatigue.   HENT:  Positive for nasal congestion, rhinorrhea and sinus pressure/congestion.    Respiratory:  Negative for shortness of breath.    Gastrointestinal:  Negative for abdominal pain.   Integumentary:  Negative for rash.         Objective:      Physical Exam  Vitals and nursing note reviewed.   Constitutional:       General: She is not in acute distress.     Appearance: She is ill-appearing.   Cardiovascular:      Rate and Rhythm: Normal rate and regular rhythm.      Heart sounds: No murmur heard.  Pulmonary:      Effort: Pulmonary effort is normal.      Breath sounds: Normal breath sounds. No wheezing.   Skin:     General: Skin is warm and dry.      Findings: No rash.   Neurological:      Mental Status: She is alert.   Psychiatric:         Mood and Affect: Mood normal.         Behavior: Behavior normal.         Assessment:       1. Upper respiratory tract infection, unspecified type    2. Difficulty urinating    3. Chills    4. Fever, unspecified fever cause        Plan:       Problem List Items Addressed This Visit    None  Visit Diagnoses       Upper respiratory tract infection, unspecified type    -  Primary    Relevant Orders    POCT COVID-19 Rapid Screening (Completed)    POCT Influenza A/B Molecular (Completed)    Difficulty urinating        Relevant Orders    POCT Urinalysis(Instrument) (Completed)    POCT COVID-19 Rapid Screening (Completed)    POCT Influenza A/B Molecular (Completed)    Urine culture    Chills        Relevant Orders    POCT  COVID-19 Rapid Screening (Completed)    POCT Influenza A/B Molecular (Completed)    Fever, unspecified fever cause        Relevant Orders    POCT COVID-19 Rapid Screening (Completed)    POCT Influenza A/B Molecular (Completed)            Covid and flu negative. Suspect viral URI. Treat OTC medication.   UA borderline. Sending for culture will treat if culture positive

## 2024-02-11 LAB
BACTERIA UR CULT: NORMAL
BACTERIA UR CULT: NORMAL

## 2024-02-16 ENCOUNTER — PATIENT MESSAGE (OUTPATIENT)
Dept: FAMILY MEDICINE | Facility: CLINIC | Age: 64
End: 2024-02-16
Payer: COMMERCIAL

## 2024-03-13 ENCOUNTER — PATIENT MESSAGE (OUTPATIENT)
Dept: FAMILY MEDICINE | Facility: CLINIC | Age: 64
End: 2024-03-13
Payer: COMMERCIAL

## 2024-03-13 ENCOUNTER — HOSPITAL ENCOUNTER (OUTPATIENT)
Dept: RADIOLOGY | Facility: HOSPITAL | Age: 64
Discharge: HOME | End: 2024-03-13
Attending: STUDENT IN AN ORGANIZED HEALTH CARE EDUCATION/TRAINING PROGRAM
Payer: COMMERCIAL

## 2024-03-13 DIAGNOSIS — Z12.31 ENCOUNTER FOR SCREENING MAMMOGRAM FOR BREAST CANCER: ICD-10-CM

## 2024-03-13 DIAGNOSIS — R92.8 ABNORMAL MAMMOGRAM: Primary | ICD-10-CM

## 2024-03-13 PROCEDURE — 77067 SCR MAMMO BI INCL CAD: CPT | Mod: TC,PO

## 2024-03-20 ENCOUNTER — HOSPITAL ENCOUNTER (OUTPATIENT)
Dept: RADIOLOGY | Facility: HOSPITAL | Age: 64
Discharge: HOME | End: 2024-03-20
Attending: STUDENT IN AN ORGANIZED HEALTH CARE EDUCATION/TRAINING PROGRAM
Payer: COMMERCIAL

## 2024-03-20 DIAGNOSIS — R92.8 ABNORMAL MAMMOGRAM: ICD-10-CM

## 2024-03-20 PROCEDURE — 77065 DX MAMMO INCL CAD UNI: CPT | Mod: 26,LT,, | Performed by: RADIOLOGY

## 2024-03-20 PROCEDURE — 76642 ULTRASOUND BREAST LIMITED: CPT | Mod: TC,LT

## 2024-03-20 PROCEDURE — 76642 ULTRASOUND BREAST LIMITED: CPT | Mod: 26,LT,, | Performed by: RADIOLOGY

## 2024-03-20 PROCEDURE — 77061 BREAST TOMOSYNTHESIS UNI: CPT | Mod: TC,LT

## 2024-03-20 PROCEDURE — 77065 DX MAMMO INCL CAD UNI: CPT | Mod: TC,LT

## 2024-03-20 PROCEDURE — 77061 BREAST TOMOSYNTHESIS UNI: CPT | Mod: 26,LT,, | Performed by: RADIOLOGY

## 2024-04-09 ENCOUNTER — PATIENT MESSAGE (OUTPATIENT)
Dept: FAMILY MEDICINE | Facility: CLINIC | Age: 64
End: 2024-04-09
Payer: COMMERCIAL

## 2024-04-22 ENCOUNTER — HOSPITAL ENCOUNTER (OUTPATIENT)
Dept: RADIOLOGY | Facility: CLINIC | Age: 64
Discharge: HOME | End: 2024-04-22
Attending: STUDENT IN AN ORGANIZED HEALTH CARE EDUCATION/TRAINING PROGRAM
Payer: COMMERCIAL

## 2024-04-22 DIAGNOSIS — Z12.31 ENCOUNTER FOR SCREENING MAMMOGRAM FOR BREAST CANCER: ICD-10-CM

## 2024-05-07 ENCOUNTER — OFFICE VISIT (OUTPATIENT)
Dept: FAMILY MEDICINE | Facility: CLINIC | Age: 64
End: 2024-05-07
Payer: COMMERCIAL

## 2024-05-07 ENCOUNTER — LAB VISIT (OUTPATIENT)
Dept: LAB | Facility: HOSPITAL | Age: 64
End: 2024-05-07
Attending: STUDENT IN AN ORGANIZED HEALTH CARE EDUCATION/TRAINING PROGRAM
Payer: COMMERCIAL

## 2024-05-07 VITALS
WEIGHT: 177.63 LBS | DIASTOLIC BLOOD PRESSURE: 84 MMHG | RESPIRATION RATE: 16 BRPM | BODY MASS INDEX: 25.43 KG/M2 | HEIGHT: 70 IN | OXYGEN SATURATION: 97 % | HEART RATE: 75 BPM | SYSTOLIC BLOOD PRESSURE: 130 MMHG

## 2024-05-07 DIAGNOSIS — M54.41 CHRONIC MIDLINE LOW BACK PAIN WITH RIGHT-SIDED SCIATICA: ICD-10-CM

## 2024-05-07 DIAGNOSIS — R31.9 HEMATURIA, UNSPECIFIED TYPE: Primary | ICD-10-CM

## 2024-05-07 DIAGNOSIS — E05.90 HYPERTHYROIDISM: ICD-10-CM

## 2024-05-07 DIAGNOSIS — M81.0 AGE-RELATED OSTEOPOROSIS WITHOUT CURRENT PATHOLOGICAL FRACTURE: ICD-10-CM

## 2024-05-07 DIAGNOSIS — F43.21 SITUATIONAL DEPRESSION: ICD-10-CM

## 2024-05-07 DIAGNOSIS — M54.2 NECK PAIN: ICD-10-CM

## 2024-05-07 DIAGNOSIS — Z79.899 LONG-TERM CURRENT USE OF BENZODIAZEPINE: ICD-10-CM

## 2024-05-07 DIAGNOSIS — G89.29 CHRONIC MIDLINE LOW BACK PAIN WITH RIGHT-SIDED SCIATICA: ICD-10-CM

## 2024-05-07 DIAGNOSIS — F41.0 PANIC DISORDER: ICD-10-CM

## 2024-05-07 LAB
AMP AMPHETAMINE 1000 NM/ML POC: NEGATIVE
BAR BARBITURATES 300 NG/ML POC: NEGATIVE
BILIRUBIN, UA POC OHS: NEGATIVE
BLOOD, UA POC OHS: ABNORMAL
BUP BUPRENORPHINE 10 NG/ML POC: NEGATIVE
BZO BENZODIAZEPINES 300 NG/ML POC: NEGATIVE
CLARITY, UA POC OHS: CLEAR
COC COCAINE 300 NG/ML POC: NEGATIVE
COLOR, UA POC OHS: YELLOW
CREATININE (CR) POC: 20
CTP QC/QA: YES
GLUCOSE, UA POC OHS: NEGATIVE
KETONES, UA POC OHS: NEGATIVE
LEUKOCYTES, UA POC OHS: ABNORMAL
MET METHAMPHETAMINE 1000 NG/ML POC: NEGATIVE
MOP/OPI300 MORPHINE 300 NG/ML POC: NEGATIVE
MTD METHADONE 300 NG/ML POC: NEGATIVE
NITRITE, UA POC OHS: NEGATIVE
OXIDANT (OX) POC: NEGATIVE
OXY OXYCODONE 100 NG/ML POC: NEGATIVE
PH, UA POC OHS: 6.5
PROTEIN, UA POC OHS: NEGATIVE
SPECIFIC GRAVITY (SG) POC: 1.01
SPECIFIC GRAVITY, UA POC OHS: 1.01
TEMPERATURE (°F) POC: 92
THC MARIJUANA 50 NG/ML POC: NEGATIVE
TSH SERPL DL<=0.005 MIU/L-ACNC: 1.01 UIU/ML (ref 0.4–4)
UROBILINOGEN, UA POC OHS: 0.2

## 2024-05-07 PROCEDURE — 3008F BODY MASS INDEX DOCD: CPT | Mod: S$GLB,,, | Performed by: STUDENT IN AN ORGANIZED HEALTH CARE EDUCATION/TRAINING PROGRAM

## 2024-05-07 PROCEDURE — 99999 PR PBB SHADOW E&M-EST. PATIENT-LVL V: CPT | Mod: PBBFAC,,, | Performed by: STUDENT IN AN ORGANIZED HEALTH CARE EDUCATION/TRAINING PROGRAM

## 2024-05-07 PROCEDURE — 36415 COLL VENOUS BLD VENIPUNCTURE: CPT | Performed by: STUDENT IN AN ORGANIZED HEALTH CARE EDUCATION/TRAINING PROGRAM

## 2024-05-07 PROCEDURE — 81003 URINALYSIS AUTO W/O SCOPE: CPT | Mod: 59,QW,S$GLB, | Performed by: STUDENT IN AN ORGANIZED HEALTH CARE EDUCATION/TRAINING PROGRAM

## 2024-05-07 PROCEDURE — 1160F RVW MEDS BY RX/DR IN RCRD: CPT | Mod: S$GLB,,, | Performed by: STUDENT IN AN ORGANIZED HEALTH CARE EDUCATION/TRAINING PROGRAM

## 2024-05-07 PROCEDURE — 3075F SYST BP GE 130 - 139MM HG: CPT | Mod: S$GLB,,, | Performed by: STUDENT IN AN ORGANIZED HEALTH CARE EDUCATION/TRAINING PROGRAM

## 2024-05-07 PROCEDURE — 3079F DIAST BP 80-89 MM HG: CPT | Mod: S$GLB,,, | Performed by: STUDENT IN AN ORGANIZED HEALTH CARE EDUCATION/TRAINING PROGRAM

## 2024-05-07 PROCEDURE — 80305 DRUG TEST PRSMV DIR OPT OBS: CPT | Mod: QW,S$GLB,, | Performed by: STUDENT IN AN ORGANIZED HEALTH CARE EDUCATION/TRAINING PROGRAM

## 2024-05-07 PROCEDURE — 1159F MED LIST DOCD IN RCRD: CPT | Mod: S$GLB,,, | Performed by: STUDENT IN AN ORGANIZED HEALTH CARE EDUCATION/TRAINING PROGRAM

## 2024-05-07 PROCEDURE — 84443 ASSAY THYROID STIM HORMONE: CPT | Performed by: STUDENT IN AN ORGANIZED HEALTH CARE EDUCATION/TRAINING PROGRAM

## 2024-05-07 PROCEDURE — 99214 OFFICE O/P EST MOD 30 MIN: CPT | Mod: S$GLB,,, | Performed by: STUDENT IN AN ORGANIZED HEALTH CARE EDUCATION/TRAINING PROGRAM

## 2024-05-07 RX ORDER — ALPRAZOLAM 0.25 MG/1
0.25 TABLET ORAL DAILY PRN
Qty: 30 TABLET | Refills: 0 | Status: SHIPPED | OUTPATIENT
Start: 2024-05-07

## 2024-05-07 NOTE — ASSESSMENT & PLAN NOTE
Uses prn xanax and rarely fills  Uds today  Controlled med agreement today   reviewed and no red flags

## 2024-05-07 NOTE — ASSESSMENT & PLAN NOTE
2022- dexa  Bone mineral density in the left hip is 0.600 g/cm2.     The T-score is -2.2 (standard deviations of Young Adult mean).     The Z-score is -0.9 (standard deviations of Age Matched mean).     The WHO classification is Osteopenia.     Total bone mineral density has increased 0.6% from prior exam.     Impression:     Osteopenia of the lumbar spine and left hip.    Repeat

## 2024-05-07 NOTE — ASSESSMENT & PLAN NOTE
Lab Results   Component Value Date    TSH 1.540 11/30/2023     Stable. Continue current medications and regular followup.

## 2024-05-07 NOTE — PROGRESS NOTES
Subjective:       Patient ID: Anthony Apple is a 63 y.o. female.    Chief Complaint: Foot Swelling, Results, and Neck Pain    Neck pain- was doing better with massage but if she sleeps wrong and has increased neck tightness and headache  She is trying to exercise and would like to do therapy for her neck.    She recently had a respiratory illness she recovered from.  Singulair helps her a lot but can cause some urinary retention and she feels like she has some of this.  She has some ankle swelling as well with this.  She stopped the Singulair and is doing better with this.     She has not started her trazodone.  She is planning to do this.    Overall doing well  She has no acute complaints          .  Patient Active Problem List   Diagnosis    Mitral valve prolapse    Family history of colon cancer    Gastroesophageal reflux disease without esophagitis    Hyperthyroidism    Osteoporosis    Seasonal allergies    Panic disorder    Paroxysmal atrial fibrillation    COVID-19    Chondral defect of right patella    Situational depression    Tonsillith     Anthony has a current medication list which includes the following prescription(s): alendronate, ammonium lactate, ammonium lactate, aspirin, azelastine, bepreve, caltrate 600 plus d, clotrimazole-betamethasone 1-0.05%, cyclobenzaprine, diphenhydramine, flecainide, fluorometholone 0.1%, fluticasone propionate, hydrocortisone, hydrocortisone-pramoxine, mg-plus-protein, metoprolol succinate, montelukast, multivitamin, pepcid, triamcinolone acetonide 0.5%, tyrvaya, urea, urea-hydrophilic cream, vitamin d3, and alprazolam.    Review of Systems   Constitutional:  Negative for activity change and appetite change.   Respiratory:  Negative for shortness of breath.    Cardiovascular:  Positive for leg swelling. Negative for chest pain and palpitations.   Gastrointestinal:  Negative for abdominal pain.   Genitourinary:  Negative for dysuria.   Integumentary:  Negative for rash.    Psychiatric/Behavioral:  Negative for sleep disturbance. The patient is not nervous/anxious.          Health Maintenance Due   Topic Date Due    Shingles Vaccine (1 of 2) Never done    RSV Vaccine (Age 60+ and Pregnant patients) (1 - 1-dose 60+ series) Never done    COVID-19 Vaccine (3 - 2023-24 season) 09/01/2023           Physical Exam  Constitutional:       General: She is not in acute distress.     Appearance: Normal appearance. She is not ill-appearing.   Eyes:      Conjunctiva/sclera: Conjunctivae normal.   Cardiovascular:      Rate and Rhythm: Normal rate and regular rhythm.      Heart sounds: Normal heart sounds. No murmur heard.  Pulmonary:      Effort: Pulmonary effort is normal. No respiratory distress.      Breath sounds: Normal breath sounds.   Musculoskeletal:      Right lower leg: No edema.      Left lower leg: No edema.   Skin:     General: Skin is warm and dry.   Neurological:      Mental Status: She is alert. Mental status is at baseline.      Gait: Gait normal.   Psychiatric:         Mood and Affect: Mood normal.         Behavior: Behavior normal.         Thought Content: Thought content normal.         Judgment: Judgment normal.         Assessment:       1. Hematuria, unspecified type    2. Panic disorder    3. Neck pain    4. Hyperthyroidism    5. Long-term current use of benzodiazepine    6. Chronic midline low back pain with right-sided sciatica    7. Situational depression    8. Age-related osteoporosis without current pathological fracture        Plan:       1. Hematuria, unspecified type  Comments:  repeat  Orders:  -     POCT Urinalysis(Instrument)    2. Panic disorder  Assessment & Plan:  Uses prn xanax and rarely fills  Uds today  Controlled med agreement today   reviewed and no red flags    Orders:  -     ALPRAZolam (XANAX) 0.25 MG tablet; Take 1 tablet (0.25 mg total) by mouth daily as needed for Anxiety.  Dispense: 30 tablet; Refill: 0    3. Neck pain  -     Ambulatory  referral/consult to Physical/Occupational Therapy; Future; Expected date: 05/14/2024    4. Hyperthyroidism  Assessment & Plan:  Lab Results   Component Value Date    TSH 1.540 11/30/2023     Stable. Continue current medications and regular followup.      Orders:  -     TSH; Future; Expected date: 05/07/2024    5. Long-term current use of benzodiazepine  -     POCT Urine Drug Screen (With BUP)    6. Chronic midline low back pain with right-sided sciatica  -     Ambulatory referral/consult to Physical/Occupational Therapy; Future; Expected date: 05/14/2024    7. Situational depression  Assessment & Plan:  Stable. Continue current medications and regular followup.        8. Age-related osteoporosis without current pathological fracture  Assessment & Plan:  2022- dexa  Bone mineral density in the left hip is 0.600 g/cm2.     The T-score is -2.2 (standard deviations of Young Adult mean).     The Z-score is -0.9 (standard deviations of Age Matched mean).     The WHO classification is Osteopenia.     Total bone mineral density has increased 0.6% from prior exam.     Impression:     Osteopenia of the lumbar spine and left hip.    Repeat    Orders:  -     DXA Bone Density Axial Skeleton 1 or more sites; Future; Expected date: 05/07/2024

## 2024-05-10 ENCOUNTER — PATIENT MESSAGE (OUTPATIENT)
Dept: FAMILY MEDICINE | Facility: CLINIC | Age: 64
End: 2024-05-10
Payer: COMMERCIAL

## 2024-06-03 ENCOUNTER — OFFICE VISIT (OUTPATIENT)
Dept: FAMILY MEDICINE | Facility: CLINIC | Age: 64
End: 2024-06-03
Payer: COMMERCIAL

## 2024-06-03 VITALS
WEIGHT: 176.38 LBS | DIASTOLIC BLOOD PRESSURE: 73 MMHG | HEART RATE: 68 BPM | HEIGHT: 70 IN | BODY MASS INDEX: 25.25 KG/M2 | OXYGEN SATURATION: 99 % | SYSTOLIC BLOOD PRESSURE: 116 MMHG | RESPIRATION RATE: 16 BRPM

## 2024-06-03 DIAGNOSIS — J34.89 NASAL CONGESTION WITH RHINORRHEA: ICD-10-CM

## 2024-06-03 DIAGNOSIS — Z13.6 ENCOUNTER FOR LIPID SCREENING FOR CARDIOVASCULAR DISEASE: ICD-10-CM

## 2024-06-03 DIAGNOSIS — R09.81 NASAL CONGESTION WITH RHINORRHEA: ICD-10-CM

## 2024-06-03 DIAGNOSIS — R31.9 HEMATURIA, UNSPECIFIED TYPE: ICD-10-CM

## 2024-06-03 DIAGNOSIS — R53.83 FATIGUE, UNSPECIFIED TYPE: ICD-10-CM

## 2024-06-03 DIAGNOSIS — K21.9 GASTROESOPHAGEAL REFLUX DISEASE WITHOUT ESOPHAGITIS: ICD-10-CM

## 2024-06-03 DIAGNOSIS — J30.2 SEASONAL ALLERGIES: Primary | ICD-10-CM

## 2024-06-03 DIAGNOSIS — Z79.899 LONG-TERM USE OF HIGH-RISK MEDICATION: ICD-10-CM

## 2024-06-03 DIAGNOSIS — Z13.220 ENCOUNTER FOR LIPID SCREENING FOR CARDIOVASCULAR DISEASE: ICD-10-CM

## 2024-06-03 DIAGNOSIS — E05.90 HYPERTHYROIDISM: ICD-10-CM

## 2024-06-03 PROCEDURE — 1159F MED LIST DOCD IN RCRD: CPT | Mod: S$GLB,,, | Performed by: STUDENT IN AN ORGANIZED HEALTH CARE EDUCATION/TRAINING PROGRAM

## 2024-06-03 PROCEDURE — 3008F BODY MASS INDEX DOCD: CPT | Mod: S$GLB,,, | Performed by: STUDENT IN AN ORGANIZED HEALTH CARE EDUCATION/TRAINING PROGRAM

## 2024-06-03 PROCEDURE — 99999 PR PBB SHADOW E&M-EST. PATIENT-LVL V: CPT | Mod: PBBFAC,,, | Performed by: STUDENT IN AN ORGANIZED HEALTH CARE EDUCATION/TRAINING PROGRAM

## 2024-06-03 PROCEDURE — 3078F DIAST BP <80 MM HG: CPT | Mod: S$GLB,,, | Performed by: STUDENT IN AN ORGANIZED HEALTH CARE EDUCATION/TRAINING PROGRAM

## 2024-06-03 PROCEDURE — 99214 OFFICE O/P EST MOD 30 MIN: CPT | Mod: S$GLB,,, | Performed by: STUDENT IN AN ORGANIZED HEALTH CARE EDUCATION/TRAINING PROGRAM

## 2024-06-03 PROCEDURE — 3074F SYST BP LT 130 MM HG: CPT | Mod: S$GLB,,, | Performed by: STUDENT IN AN ORGANIZED HEALTH CARE EDUCATION/TRAINING PROGRAM

## 2024-06-03 RX ORDER — FLUTICASONE PROPIONATE 50 MCG
1 SPRAY, SUSPENSION (ML) NASAL DAILY
Qty: 15.8 ML | Refills: 0 | Status: SHIPPED | OUTPATIENT
Start: 2024-06-03

## 2024-06-03 RX ORDER — AZELASTINE 1 MG/ML
2 SPRAY, METERED NASAL 2 TIMES DAILY
Qty: 30 ML | Refills: 1 | Status: SHIPPED | OUTPATIENT
Start: 2024-06-03

## 2024-06-03 RX ORDER — AZELASTINE 1 MG/ML
2 SPRAY, METERED NASAL 2 TIMES DAILY
Qty: 90 ML | OUTPATIENT
Start: 2024-06-03

## 2024-06-03 NOTE — PROGRESS NOTES
Subjective:       Patient ID: Anthony Apple is a 63 y.o. female.    Chief Complaint: Fatigue and Generalized Body Aches    Overall she is doing fair  She is having some issues  She gets tired and achy her entire life.  She assumes it is due to her allergies  She takes vitamin D and she got a batch that she thought they were old  She took them back to [facility] and she was worried that her vitamin D was not appropriate  She felt like she had body aches and fatigued thurs, fri, sat.  She tried to do some outside work and got overheated and felt terrible  She had a headache and was going to bed early    Today she feels better and can function  She started having a couple of days ago she ate roasted peanuts  That flairs her reflux and gives her belching and had chest pains during   Then she took gasX and it improved.    Today is much better and she didn't eat any     She has a chronic rhinitis  She is going to go to allergy  She is on astelin and flonase  She uses benadryl prn        .  Patient Active Problem List   Diagnosis    Mitral valve prolapse    Family history of colon cancer    Gastroesophageal reflux disease without esophagitis    Hyperthyroidism    Osteoporosis    Seasonal allergies    Panic disorder    Paroxysmal atrial fibrillation    COVID-19    Chondral defect of right patella    Situational depression    Tonsillith     Anthony has a current medication list which includes the following prescription(s): alendronate, alprazolam, ammonium lactate, ammonium lactate, aspirin, bepreve, caltrate 600 plus d, clotrimazole-betamethasone 1-0.05%, cyclobenzaprine, diphenhydramine, flecainide, fluorometholone 0.1%, hydrocortisone, hydrocortisone-pramoxine, mg-plus-protein, metoprolol succinate, montelukast, multivitamin, pepcid, triamcinolone acetonide 0.5%, tyrvaya, urea, urea-hydrophilic cream, vitamin d3, azelastine, and fluticasone propionate.    Review of Systems   Constitutional:  Negative for activity change and  appetite change.   HENT:  Positive for postnasal drip and sinus pressure/congestion.    Respiratory:  Negative for shortness of breath.    Cardiovascular:  Negative for chest pain.   Gastrointestinal:  Positive for reflux. Negative for abdominal pain.   Genitourinary:  Negative for dysuria.   Integumentary:  Negative for rash.   Psychiatric/Behavioral:  Negative for dysphoric mood and sleep disturbance. The patient is not nervous/anxious.          Health Maintenance Due   Topic Date Due    Shingles Vaccine (1 of 2) Never done    RSV Vaccine (Age 60+ and Pregnant patients) (1 - 1-dose 60+ series) Never done    COVID-19 Vaccine (3 - 2023-24 season) 09/01/2023      Health Maintenance reviewed and discussed- encouraged vaccines.     Objective:      Physical Exam  Constitutional:       General: She is not in acute distress.     Appearance: Normal appearance. She is not ill-appearing.   Eyes:      Conjunctiva/sclera: Conjunctivae normal.   Cardiovascular:      Rate and Rhythm: Normal rate and regular rhythm.      Heart sounds: Normal heart sounds. No murmur heard.  Pulmonary:      Effort: Pulmonary effort is normal. No respiratory distress.      Breath sounds: Normal breath sounds.   Musculoskeletal:      Right lower leg: No edema.      Left lower leg: No edema.   Skin:     General: Skin is warm and dry.   Neurological:      Mental Status: She is alert. Mental status is at baseline.      Gait: Gait normal.   Psychiatric:         Mood and Affect: Mood normal.         Behavior: Behavior normal.         Thought Content: Thought content normal.         Judgment: Judgment normal.         Assessment:       1. Seasonal allergies    2. Nasal congestion with rhinorrhea    3. Gastroesophageal reflux disease without esophagitis    4. Fatigue, unspecified type    5. Hematuria, unspecified type    6. Hyperthyroidism    7. Long-term use of high-risk medication    8. Encounter for lipid screening for cardiovascular disease        Plan:        1. Seasonal allergies  Assessment & Plan:  Restart her nasal sprays.  Uses benadryl  She is planning to see allergy      2. Nasal congestion with rhinorrhea  -     azelastine (ASTELIN) 137 mcg (0.1 %) nasal spray; 2 sprays (274 mcg total) by Nasal route 2 (two) times daily.  Dispense: 30 mL; Refill: 1  -     fluticasone propionate (FLONASE) 50 mcg/actuation nasal spray; 1 spray (50 mcg total) by Each Nostril route once daily.  Dispense: 15.8 mL; Refill: 0    3. Gastroesophageal reflux disease without esophagitis  Assessment & Plan:  Restart her nexium and H2 blocker  Lay off of the peanuts and monitor      4. Fatigue, unspecified type  Comments:  Better off of her vitamin D- monitor  Orders:  -     CBC Auto Differential; Future; Expected date: 06/03/2024  -     Comprehensive Metabolic Panel; Future; Expected date: 06/03/2024  -     Vitamin D; Future; Expected date: 06/03/2024  -     Vitamin B12; Future; Expected date: 06/03/2024  -     Magnesium; Future; Expected date: 06/03/2024    5. Hematuria, unspecified type  Comments:  saw urology and has had a cystoscope and CT and had negative workup    6. Hyperthyroidism  -     Microalbumin/Creatinine Ratio, Urine; Future; Expected date: 06/03/2024  -     TSH; Future; Expected date: 06/03/2024    7. Long-term use of high-risk medication  -     Hemoglobin A1C; Future; Expected date: 06/03/2024  -     Vitamin D; Future; Expected date: 06/03/2024  -     Vitamin B12; Future; Expected date: 06/03/2024  -     Magnesium; Future; Expected date: 06/03/2024    8. Encounter for lipid screening for cardiovascular disease  -     Lipid Panel; Future; Expected date: 06/03/2024

## 2024-06-03 NOTE — TELEPHONE ENCOUNTER
No care due was identified.  [facility] Embedded Care Due Messages. Reference number: [id number].   6/03/2024 3:54:22 PM CDT

## 2024-06-03 NOTE — TELEPHONE ENCOUNTER
Refill Decision Note   Anthony Apple  is requesting a refill authorization.  Brief Assessment and Rationale for Refill:  Quick Discontinue     Medication Therapy Plan:  Receipt confirmed by pharmacy (6/3/2024  3:31 PM CDT)    Medication Reconciliation Completed: No   Comments:     No Care Gaps recommended.     Note composed:3:58 PM 06/03/2024

## 2024-07-29 ENCOUNTER — PATIENT MESSAGE (OUTPATIENT)
Dept: CARDIOLOGY | Facility: CLINIC | Age: 64
End: 2024-07-29
Payer: COMMERCIAL

## 2024-07-29 ENCOUNTER — TELEPHONE (OUTPATIENT)
Dept: FAMILY MEDICINE | Facility: CLINIC | Age: 64
End: 2024-07-29
Payer: COMMERCIAL

## 2024-07-29 ENCOUNTER — E-VISIT (OUTPATIENT)
Dept: FAMILY MEDICINE | Facility: CLINIC | Age: 64
End: 2024-07-29
Payer: COMMERCIAL

## 2024-07-29 DIAGNOSIS — U07.1 COVID-19: Primary | ICD-10-CM

## 2024-07-29 RX ORDER — IBUPROFEN 800 MG/1
800 TABLET ORAL 3 TIMES DAILY
Qty: 30 TABLET | Refills: 0 | Status: SHIPPED | OUTPATIENT
Start: 2024-07-29 | End: 2024-08-08

## 2024-07-29 NOTE — PROGRESS NOTES
Patient ID: Anthony Apple is a 63 y.o. female.    Chief Complaint: COVID-19 Concerns    The patient initiated a request through [facility] on 7/29/2024 for evaluation and management with a chief complaint of COVID-19 Concerns     I evaluated the questionnaire submission on 7/29/24.    [facility]    7/29/2024  9:26 AM CDT - Filed by Patient   Do you agree to participate in an E-Visit? Yes   If you have any of the following symptoms, please present to your local emergency room or call 911:  I acknowledge   What is the main issue you would like addressed today? Tested positive for Covid yesterday.  I am in severe pain, extreme headache , earache pain all over body. Head congestion Need something asap.   Please describe your symptoms Headache earach. Severe pain all over   Where is your problem located? All over   How severe are your symptoms? Severe   Have you had these symptoms before? Yes   How long have you been having these symptoms? For a few days   Please list any medications or treatments you have used for your condition and indicate if it was effective or not. Tylenol   What makes this feel better? Nothing   What makes this feel worse? Nothing   Are these symptoms related to a condition that you currently have? Yes   What is the condition? Covid   When were you last seen for this condition? 7/29/2024   Please describe any probable cause for these symptoms Xx   Provide any additional information you feel is important.    Please attach any relevant images or files    Are you able to take your vital signs? No         Encounter Diagnosis   Name Primary?    COVID-19 Yes        No orders of the defined types were placed in this encounter.     Medications Ordered This Encounter   Medications    ibuprofen (ADVIL,MOTRIN) 800 MG tablet     Sig: Take 1 tablet (800 mg total) by mouth 3 (three) times daily. for 10 days     Dispense:  30 tablet     Refill:  0        No follow-ups on file.    Please proceed to  the emergency room for worsening symptoms.    E-Visit Time Tracking:    Day 1 Time (in minutes): 5    Total Time (in minutes): 5

## 2024-07-29 NOTE — TELEPHONE ENCOUNTER
----- Message from Bina Curtis sent at 7/29/2024  7:09 AM CDT -----  Regarding: Needs return call asap  Type: Needs Medical Advice  Who Called:  Pt    Best Call Back Number: [phone]    Additional Information: Pt has tested positive for covid at home, she is scheduled at 8:40am today in the office she was calling to make sure if she was supposed to come in the offcie or not, please call to advise

## 2024-08-16 ENCOUNTER — PATIENT MESSAGE (OUTPATIENT)
Dept: FAMILY MEDICINE | Facility: CLINIC | Age: 64
End: 2024-08-16
Payer: COMMERCIAL

## 2024-08-20 ENCOUNTER — LAB VISIT (OUTPATIENT)
Dept: LAB | Facility: HOSPITAL | Age: 64
End: 2024-08-20
Attending: STUDENT IN AN ORGANIZED HEALTH CARE EDUCATION/TRAINING PROGRAM
Payer: COMMERCIAL

## 2024-08-20 DIAGNOSIS — Z13.220 ENCOUNTER FOR LIPID SCREENING FOR CARDIOVASCULAR DISEASE: ICD-10-CM

## 2024-08-20 DIAGNOSIS — Z13.6 ENCOUNTER FOR LIPID SCREENING FOR CARDIOVASCULAR DISEASE: ICD-10-CM

## 2024-08-20 DIAGNOSIS — E05.90 HYPERTHYROIDISM: ICD-10-CM

## 2024-08-20 DIAGNOSIS — R53.83 FATIGUE, UNSPECIFIED TYPE: ICD-10-CM

## 2024-08-20 DIAGNOSIS — Z79.899 LONG-TERM USE OF HIGH-RISK MEDICATION: ICD-10-CM

## 2024-08-20 LAB
ALBUMIN SERPL BCP-MCNC: 4.2 G/DL (ref 3.5–5.2)
ALP SERPL-CCNC: 54 U/L (ref 55–135)
ALT SERPL W/O P-5'-P-CCNC: 21 U/L (ref 10–44)
ANION GAP SERPL CALC-SCNC: 6 MMOL/L (ref 8–16)
AST SERPL-CCNC: 24 U/L (ref 10–40)
BASOPHILS # BLD AUTO: 0.01 K/UL (ref 0–0.2)
BASOPHILS NFR BLD: 0.3 % (ref 0–1.9)
BILIRUB SERPL-MCNC: 1 MG/DL (ref 0.1–1)
BUN SERPL-MCNC: 15 MG/DL (ref 8–23)
CALCIUM SERPL-MCNC: 9.8 MG/DL (ref 8.7–10.5)
CHLORIDE SERPL-SCNC: 107 MMOL/L (ref 95–110)
CHOLEST SERPL-MCNC: 174 MG/DL (ref 120–199)
CHOLEST/HDLC SERPL: 3.2 {RATIO} (ref 2–5)
CO2 SERPL-SCNC: 25 MMOL/L (ref 23–29)
CREAT SERPL-MCNC: 0.8 MG/DL (ref 0.5–1.4)
DIFFERENTIAL METHOD BLD: ABNORMAL
EOSINOPHIL # BLD AUTO: 0.1 K/UL (ref 0–0.5)
EOSINOPHIL NFR BLD: 2.7 % (ref 0–8)
ERYTHROCYTE [DISTWIDTH] IN BLOOD BY AUTOMATED COUNT: 12.5 % (ref 11.5–14.5)
EST. GFR  (NO RACE VARIABLE): >60 ML/MIN/1.73 M^2
ESTIMATED AVG GLUCOSE: 97 MG/DL (ref 68–131)
GLUCOSE SERPL-MCNC: 91 MG/DL (ref 70–110)
HBA1C MFR BLD: 5 % (ref 4–5.6)
HCT VFR BLD AUTO: 40.7 % (ref 37–48.5)
HDLC SERPL-MCNC: 55 MG/DL (ref 40–75)
HDLC SERPL: 31.6 % (ref 20–50)
HGB BLD-MCNC: 13.4 G/DL (ref 12–16)
IMM GRANULOCYTES # BLD AUTO: 0 K/UL (ref 0–0.04)
IMM GRANULOCYTES NFR BLD AUTO: 0 % (ref 0–0.5)
LDLC SERPL CALC-MCNC: 100.6 MG/DL (ref 63–159)
LYMPHOCYTES # BLD AUTO: 1.4 K/UL (ref 1–4.8)
LYMPHOCYTES NFR BLD: 39 % (ref 18–48)
MAGNESIUM SERPL-MCNC: 2.1 MG/DL (ref 1.6–2.6)
MCH RBC QN AUTO: 32 PG (ref 27–31)
MCHC RBC AUTO-ENTMCNC: 32.9 G/DL (ref 32–36)
MCV RBC AUTO: 97 FL (ref 82–98)
MONOCYTES # BLD AUTO: 0.3 K/UL (ref 0.3–1)
MONOCYTES NFR BLD: 7.4 % (ref 4–15)
NEUTROPHILS # BLD AUTO: 1.8 K/UL (ref 1.8–7.7)
NEUTROPHILS NFR BLD: 50.6 % (ref 38–73)
NONHDLC SERPL-MCNC: 119 MG/DL
NRBC BLD-RTO: 0 /100 WBC
PLATELET # BLD AUTO: 178 K/UL (ref 150–450)
PMV BLD AUTO: 9.6 FL (ref 9.2–12.9)
POTASSIUM SERPL-SCNC: 4.5 MMOL/L (ref 3.5–5.1)
PROT SERPL-MCNC: 7.4 G/DL (ref 6–8.4)
RBC # BLD AUTO: 4.19 M/UL (ref 4–5.4)
SODIUM SERPL-SCNC: 138 MMOL/L (ref 136–145)
TRIGL SERPL-MCNC: 92 MG/DL (ref 30–150)
TSH SERPL DL<=0.005 MIU/L-ACNC: 1.72 UIU/ML (ref 0.4–4)
WBC # BLD AUTO: 3.64 K/UL (ref 3.9–12.7)

## 2024-08-20 PROCEDURE — 82306 VITAMIN D 25 HYDROXY: CPT | Performed by: STUDENT IN AN ORGANIZED HEALTH CARE EDUCATION/TRAINING PROGRAM

## 2024-08-20 PROCEDURE — 85025 COMPLETE CBC W/AUTO DIFF WBC: CPT | Performed by: STUDENT IN AN ORGANIZED HEALTH CARE EDUCATION/TRAINING PROGRAM

## 2024-08-20 PROCEDURE — 84443 ASSAY THYROID STIM HORMONE: CPT | Performed by: STUDENT IN AN ORGANIZED HEALTH CARE EDUCATION/TRAINING PROGRAM

## 2024-08-20 PROCEDURE — 83735 ASSAY OF MAGNESIUM: CPT | Performed by: STUDENT IN AN ORGANIZED HEALTH CARE EDUCATION/TRAINING PROGRAM

## 2024-08-20 PROCEDURE — 82043 UR ALBUMIN QUANTITATIVE: CPT | Performed by: STUDENT IN AN ORGANIZED HEALTH CARE EDUCATION/TRAINING PROGRAM

## 2024-08-20 PROCEDURE — 82570 ASSAY OF URINE CREATININE: CPT | Performed by: STUDENT IN AN ORGANIZED HEALTH CARE EDUCATION/TRAINING PROGRAM

## 2024-08-20 PROCEDURE — 82607 VITAMIN B-12: CPT | Performed by: STUDENT IN AN ORGANIZED HEALTH CARE EDUCATION/TRAINING PROGRAM

## 2024-08-20 PROCEDURE — 80061 LIPID PANEL: CPT | Performed by: STUDENT IN AN ORGANIZED HEALTH CARE EDUCATION/TRAINING PROGRAM

## 2024-08-20 PROCEDURE — 36415 COLL VENOUS BLD VENIPUNCTURE: CPT | Performed by: STUDENT IN AN ORGANIZED HEALTH CARE EDUCATION/TRAINING PROGRAM

## 2024-08-20 PROCEDURE — 80053 COMPREHEN METABOLIC PANEL: CPT | Performed by: STUDENT IN AN ORGANIZED HEALTH CARE EDUCATION/TRAINING PROGRAM

## 2024-08-20 PROCEDURE — 83036 HEMOGLOBIN GLYCOSYLATED A1C: CPT | Performed by: STUDENT IN AN ORGANIZED HEALTH CARE EDUCATION/TRAINING PROGRAM

## 2024-08-21 LAB
25(OH)D3+25(OH)D2 SERPL-MCNC: 58 NG/ML (ref 30–96)
ALBUMIN/CREAT UR: 9.9 UG/MG (ref 0–30)
CREAT UR-MCNC: 151 MG/DL (ref 15–325)
MICROALBUMIN UR DL<=1MG/L-MCNC: 15 UG/ML
VIT B12 SERPL-MCNC: 918 PG/ML (ref 210–950)

## 2024-08-22 DIAGNOSIS — M81.0 SENILE OSTEOPOROSIS: Primary | ICD-10-CM

## 2024-08-28 ENCOUNTER — OFFICE VISIT (OUTPATIENT)
Dept: CARDIOLOGY | Facility: CLINIC | Age: 64
End: 2024-08-28
Payer: COMMERCIAL

## 2024-08-28 VITALS
RESPIRATION RATE: 16 BRPM | DIASTOLIC BLOOD PRESSURE: 72 MMHG | WEIGHT: 177 LBS | SYSTOLIC BLOOD PRESSURE: 132 MMHG | BODY MASS INDEX: 25.34 KG/M2 | OXYGEN SATURATION: 98 % | HEIGHT: 70 IN | HEART RATE: 73 BPM

## 2024-08-28 DIAGNOSIS — F43.21 SITUATIONAL DEPRESSION: ICD-10-CM

## 2024-08-28 DIAGNOSIS — E05.90 HYPERTHYROIDISM: ICD-10-CM

## 2024-08-28 DIAGNOSIS — I48.0 PAROXYSMAL ATRIAL FIBRILLATION: Primary | ICD-10-CM

## 2024-08-28 DIAGNOSIS — U07.1 COVID-19: ICD-10-CM

## 2024-08-28 DIAGNOSIS — R00.8 VENTRICULAR BIGEMINY SEEN ON CARDIAC MONITOR: ICD-10-CM

## 2024-08-28 DIAGNOSIS — I34.1 MVP (MITRAL VALVE PROLAPSE): ICD-10-CM

## 2024-08-28 DIAGNOSIS — I34.1 MITRAL VALVE PROLAPSE: ICD-10-CM

## 2024-08-28 PROCEDURE — 3044F HG A1C LEVEL LT 7.0%: CPT | Mod: CPTII,S$GLB,, | Performed by: INTERNAL MEDICINE

## 2024-08-28 PROCEDURE — 99999 PR PBB SHADOW E&M-EST. PATIENT-LVL V: CPT | Mod: PBBFAC,,, | Performed by: INTERNAL MEDICINE

## 2024-08-28 PROCEDURE — 3078F DIAST BP <80 MM HG: CPT | Mod: CPTII,S$GLB,, | Performed by: INTERNAL MEDICINE

## 2024-08-28 PROCEDURE — 1159F MED LIST DOCD IN RCRD: CPT | Mod: CPTII,S$GLB,, | Performed by: INTERNAL MEDICINE

## 2024-08-28 PROCEDURE — 3075F SYST BP GE 130 - 139MM HG: CPT | Mod: CPTII,S$GLB,, | Performed by: INTERNAL MEDICINE

## 2024-08-28 PROCEDURE — 99214 OFFICE O/P EST MOD 30 MIN: CPT | Mod: S$GLB,,, | Performed by: INTERNAL MEDICINE

## 2024-08-28 PROCEDURE — 3008F BODY MASS INDEX DOCD: CPT | Mod: CPTII,S$GLB,, | Performed by: INTERNAL MEDICINE

## 2024-08-28 PROCEDURE — 1160F RVW MEDS BY RX/DR IN RCRD: CPT | Mod: CPTII,S$GLB,, | Performed by: INTERNAL MEDICINE

## 2024-08-28 NOTE — PROGRESS NOTES
Subjective:    Patient ID:  Anthony Apple is a 63 y.o. female    Chief Complaint   Patient presents with    Atrial Fibrillation       HPI:  Ms Anthony Apple is a 63 y.o. female for follow-up.    Patient has been doing well except in July patient had COVID infection.  And with that she developed pleuritic chest pain and it lasted almost a month.  She seems to have recovered well.    Her breathing is good denies any shortness a breath or difficulty in breathing denies any chest pain or tightness or heaviness.  Denies any loss of consciousness.    She is taking her medications regularly.    Review of patient's allergies indicates:   Allergen Reactions    Cefdinir Anaphylaxis, Rash and Shortness Of Breath    Nitrofurantoin macrocrystal Itching    Sulfamethoxazole-trimethoprim Itching, Other (See Comments) and Rash       Past Medical History:   Diagnosis Date    Atrial fibrillation     GERD (gastroesophageal reflux disease)     Hyperthyroidism     Osteoporosis     Panic attack     Seasonal allergies      Past Surgical History:   Procedure Laterality Date    none       Social History     Tobacco Use    Smoking status: Never    Smokeless tobacco: Never   Substance Use Topics    Alcohol use: Yes     Alcohol/week: 2.0 standard drinks of alcohol     Types: 1 Glasses of wine, 1 Cans of beer per week     Comment: on occasion     Drug use: Not Currently     Family History   Problem Relation Name Age of Onset    Stroke Mother      Arthritis Mother      Thyroid disease Mother      Colon cancer Father      Atrial fibrillation Father      Thyroid disease Father          Review of Systems:   Constitution: Negative for diaphoresis and fever.   HEENT: Negative for nosebleeds.    Cardiovascular: Negative for chest pain       No dyspnea on exertion       No leg swelling        No palpitations  Respiratory: Negative for shortness of breath and wheezing.    Hematologic/Lymphatic: Negative for bleeding problem. Does not bruise/bleed  easily.   Skin: Negative for color change and rash.   Musculoskeletal: Negative for falls and myalgias.   Gastrointestinal: Negative for hematemesis and hematochezia.   Genitourinary: Negative for hematuria.   Neurological: Negative for dizziness and light-headedness.   Psychiatric/Behavioral: Negative for altered mental status and memory loss.          Objective:        Vitals:    08/28/24 1423   BP: 132/72   Pulse: 73   Resp: 16       Lab Results   Component Value Date    WBC 3.64 (L) 08/20/2024    HGB 13.4 08/20/2024    HCT 40.7 08/20/2024     08/20/2024    CHOL 174 08/20/2024    TRIG 92 08/20/2024    HDL 55 08/20/2024    ALT 21 08/20/2024    AST 24 08/20/2024     08/20/2024    K 4.5 08/20/2024     08/20/2024    CREATININE 0.8 08/20/2024    BUN 15 08/20/2024    CO2 25 08/20/2024    TSH 1.720 08/20/2024    HGBA1C 5.0 08/20/2024        ECHOCARDIOGRAM RESULTS  Results for orders placed during the hospital encounter of 03/18/22    Echo    Interpretation Summary  · The left ventricle is normal in size with concentric remodeling and normal systolic function.  · The estimated ejection fraction is 60%.  · Normal left ventricular diastolic function.  · Normal right ventricular size with normal right ventricular systolic function.  · Mild tricuspid regurgitation.        CURRENT/PREVIOUS VISIT EKG  Results for orders placed or performed in visit on 01/11/24   IN OFFICE EKG 12-LEAD (to [place])    Collection Time: 01/11/24 11:46 AM    Narrative    Test Reason : I48.0,    Vent. Rate : 069 BPM     Atrial Rate : 069 BPM     P-R Int : 152 ms          QRS Dur : 094 ms      QT Int : 380 ms       P-R-T Axes : 063 073 061 degrees     QTc Int : 407 ms    Sinus rhythm with Premature atrial complexes  Otherwise normal ECG  When compared with ECG of 21-JUN-2023 08:32,  Premature atrial complexes are now Present  Confirmed by Kurt Koo MD ([id number]) on 1/25/2024 1:57:20 PM    Referred By:             Confirmed By:Kurt  Hanane DC     No valid procedures specified.   Results for orders placed during the hospital encounter of 03/18/22    Exercise Stress - EKG    Interpretation Summary    The EKG portion of this study is negative for ischemia.    The patient reported no chest pain during the stress test.    The blood pressure response to stress was normal.    There were no arrhythmias during stress.    Patient exercised on a Edmond protocol for 5 minutes and 24 seconds and attained a maximum heart rate of 167 beats per minute which is 102% of the maximum predicted heart rate and attained 7.1 METS.      Physical Exam:  CONSTITUTIONAL: No fever, no chills  HEENT: Normocephalic, atraumatic,pupils reactive to light                 NECK:  No JVD no carotid bruit  CVS: S1S2+, RRR, systolic murmurs,   LUNGS: Clear  ABDOMEN: Soft, NT, BS+  EXTREMITIES: No cyanosis, edema  : No roman catheter  NEURO: AAO X 3  PSY: Normal affect      Medication List with Changes/Refills   Current Medications    ALENDRONATE (FOSAMAX) 70 MG TABLET    Fosamax 70 mg tablet    ALPRAZOLAM (XANAX) 0.25 MG TABLET    Take 1 tablet (0.25 mg total) by mouth daily as needed for Anxiety.    AMMONIUM LACTATE (LAC-HYDRIN) 12 % LOTION    Apply topically 2 (two) times daily as needed for Dry Skin.    AMMONIUM LACTATE 12 % CREA    Apply twice daily if needed for dry skin    ASPIRIN 81 MG CAP    Take 1 capsule by mouth every other day.    AZELASTINE (ASTELIN) 137 MCG (0.1 %) NASAL SPRAY    2 sprays (274 mcg total) by Nasal route 2 (two) times daily.    BEPREVE 1.5 % DROP    PLACE 1 DROP INTO OU BID    CALCIUM CARBONATE-VITAMIN D3 (CALTRATE 600 PLUS D) 600 MG (1,500 MG)-800 UNIT CHEW    Caltrate 600 plus D  600 mg (1,500 mg)-800 unit chewable tablet   Take 1 tablet twice a day by oral route.    CLOTRIMAZOLE-BETAMETHASONE 1-0.05% (LOTRISONE) CREAM    Apply topically 2 (two) times daily.    CYCLOBENZAPRINE (FLEXERIL) 10 MG TABLET    Take 1 tablet (10 mg total) by mouth every  evening.    DIPHENHYDRAMINE (BENADRYL) 25 MG CAPSULE    Take 25 mg by mouth every 6 (six) hours as needed for Itching.    FLECAINIDE (TAMBOCOR) 100 MG TAB    Take 1 tablet (100 mg total) by mouth once daily.    FLUOROMETHOLONE 0.1% (FML) 0.1 % DRPS    Place into both eyes.    FLUTICASONE PROPIONATE (FLONASE) 50 MCG/ACTUATION NASAL SPRAY    1 spray (50 mcg total) by Each Nostril route once daily.    HYDROCORTISONE 2.5 % CREAM    SMARTSIG:sparingly Rectally Twice Daily    HYDROCORTISONE-PRAMOXINE 2-1 % GLPE        MAGNESIUM OXIDE-MG AA CHELATE (MG-PLUS-PROTEIN) 133 MG TAB    Take by mouth.    METOPROLOL SUCCINATE (TOPROL-XL) 25 MG 24 HR TABLET    Take 0.5 tablets (12.5 mg total) by mouth 2 (two) times daily.    MONTELUKAST (SINGULAIR) 10 MG TABLET    Take 1 tablet (10 mg total) by mouth once daily.    MULTIVITAMIN (ONE DAILY MULTIVITAMIN) PER TABLET    Take 1 tablet by mouth once daily.    PEPCID 40 MG TABLET    Take 40 mg by mouth every evening.    TRIAMCINOLONE ACETONIDE 0.5% (KENALOG) 0.5 % CREA    Apply topically 2 (two) times daily.    TYRVAYA 0.03 MG/SPRAY SPRM    1 spray by Each Nostril route 2 (two) times daily.    UREA 40 % LOTN LOTION        UREA-HYDROPHILIC CREAM LOTN    Apply 1 application  topically once daily.    VITAMIN D3 25 MCG (1,000 UNIT) CAPSULE    Take 1,000 Units by mouth Daily.             Assessment:       1. Paroxysmal atrial fibrillation    2. COVID-19    3. Ventricular bigeminy seen on cardiac monitor    4. Hyperthyroidism    5. MVP (mitral valve prolapse)    6. Situational depression         Plan:   1. Paroxysmal atrial fibrillation   Patient is doing well at the present time.  And she is currently on metoprolol succinate 12.5 mg p.o. b.i.d. continue the same and she is on flecainide 100 mg p.o. daily.  And patient is in sinus rhythm with a heart rate of 67 beats per minute and she is also on aspirin 81 mg p.o. Q every other day continue the same.  2. Recent COVID infection.    Patient  recently had COVID infection she is pretty much recovered from it.  She has had pleuritic chest pain with it.  And much of it is resolved.    3. Ventricular bigeminy   Patient is stable has not had any palpitations during this episode of COVID.  Continue metoprolol succinate 12.5 mg p.o. b.i.d. and Tambocor 100 mg p.o. daily it has also helped her with her palpitations.    4. Hypothyroidism   She is doing well without any medication at the present time.  Much of it is as resolved.  5. Mitral valve prolapse   Patient is stable at the present time.  6. Preop clearance   Patient is contemplating uterine surgery and needs clearance.  Will schedule her for regular exercise stress test to evaluate for the same.  Will also do a 2D echocardiogram.  7. EKG   Reviewed EKG independently patient is in normal sinus rhythm with a heart rate of 67 beats per minute normal intervals no acute ST T-wave changes.  8. I will see him back in the office after the testing is completed.        Problem List Items Addressed This Visit          Psychiatric    Situational depression       Cardiac/Vascular    Paroxysmal atrial fibrillation - Primary       ID    COVID-19       Endocrine    Hyperthyroidism     Other Visit Diagnoses       Ventricular bigeminy seen on cardiac monitor        MVP (mitral valve prolapse)                No follow-ups on file.

## 2024-08-30 ENCOUNTER — TELEPHONE (OUTPATIENT)
Dept: CARDIOLOGY | Facility: CLINIC | Age: 64
End: 2024-08-30
Payer: COMMERCIAL

## 2024-08-30 DIAGNOSIS — I34.1 MITRAL VALVE PROLAPSE: Primary | ICD-10-CM

## 2024-08-30 NOTE — TELEPHONE ENCOUNTER
----- Message from RT Reilly sent at 8/30/2024 12:12 PM CDT -----  Regarding: EKG order  Please put the EKG order in for the EKG performed on  8/28/24     ,  then attach the order to the EKG appointment or the MD appointment for the date performed.      EKG will not be read until order is received.    Thank you

## 2024-08-31 LAB
OHS QRS DURATION: 108 MS
OHS QTC CALCULATION: 412 MS

## 2024-09-13 ENCOUNTER — OFFICE VISIT (OUTPATIENT)
Dept: FAMILY MEDICINE | Facility: CLINIC | Age: 64
End: 2024-09-13
Payer: COMMERCIAL

## 2024-09-13 VITALS
DIASTOLIC BLOOD PRESSURE: 80 MMHG | HEIGHT: 70 IN | OXYGEN SATURATION: 98 % | HEART RATE: 98 BPM | BODY MASS INDEX: 25.9 KG/M2 | SYSTOLIC BLOOD PRESSURE: 126 MMHG | WEIGHT: 180.88 LBS | TEMPERATURE: 99 F | RESPIRATION RATE: 12 BRPM

## 2024-09-13 DIAGNOSIS — Z79.899 LONG-TERM CURRENT USE OF BENZODIAZEPINE: ICD-10-CM

## 2024-09-13 DIAGNOSIS — Z79.899 LONG-TERM USE OF HIGH-RISK MEDICATION: ICD-10-CM

## 2024-09-13 DIAGNOSIS — F41.0 PANIC DISORDER: ICD-10-CM

## 2024-09-13 DIAGNOSIS — N30.00 ACUTE CYSTITIS WITHOUT HEMATURIA: Primary | ICD-10-CM

## 2024-09-13 LAB
AMPHET+METHAMPHET UR QL: NEGATIVE
BARBITURATES UR QL SCN>200 NG/ML: NEGATIVE
BENZODIAZ UR QL SCN>200 NG/ML: NEGATIVE
BILIRUBIN, UA POC OHS: NEGATIVE
BLOOD, UA POC OHS: ABNORMAL
BZE UR QL SCN: NEGATIVE
CANNABINOIDS UR QL SCN: NEGATIVE
CLARITY, UA POC OHS: CLEAR
COLOR, UA POC OHS: YELLOW
CREAT UR-MCNC: 12.2 MG/DL (ref 15–325)
GLUCOSE, UA POC OHS: NEGATIVE
KETONES, UA POC OHS: NEGATIVE
LEUKOCYTES, UA POC OHS: ABNORMAL
METHADONE UR QL SCN>300 NG/ML: NEGATIVE
NITRITE, UA POC OHS: NEGATIVE
OPIATES UR QL SCN: NEGATIVE
PCP UR QL SCN>25 NG/ML: NEGATIVE
PH, UA POC OHS: 6.5
PROTEIN, UA POC OHS: NEGATIVE
SPECIFIC GRAVITY, UA POC OHS: 1.01
TOXICOLOGY INFORMATION: ABNORMAL
UROBILINOGEN, UA POC OHS: 0.2

## 2024-09-13 PROCEDURE — 99999 PR PBB SHADOW E&M-EST. PATIENT-LVL V: CPT | Mod: PBBFAC,,, | Performed by: FAMILY MEDICINE

## 2024-09-13 PROCEDURE — 87086 URINE CULTURE/COLONY COUNT: CPT | Performed by: FAMILY MEDICINE

## 2024-09-13 PROCEDURE — 80307 DRUG TEST PRSMV CHEM ANLYZR: CPT | Performed by: FAMILY MEDICINE

## 2024-09-13 RX ORDER — NITROFURANTOIN 25; 75 MG/1; MG/1
100 CAPSULE ORAL 2 TIMES DAILY
Qty: 14 CAPSULE | Refills: 0 | Status: SHIPPED | OUTPATIENT
Start: 2024-09-13 | End: 2024-09-20

## 2024-09-13 RX ORDER — ALPRAZOLAM 0.25 MG/1
0.25 TABLET ORAL DAILY PRN
Qty: 30 TABLET | Refills: 0 | Status: SHIPPED | OUTPATIENT
Start: 2024-09-13

## 2024-09-13 NOTE — PROGRESS NOTES
Subjective:       Patient ID: Anthony Apple is a 63 y.o. female.    Chief Complaint: Urinary Frequency    Frequent urination. Has been ongoing all week.   She has been taking a lot of allergy stuff.   She reports that it feels a bit a off.   She just kind feels ill.   Frequent urination and cannot hold her eyes open almost.     POCT UA with trace leukocytes and some trace blood.     A couple of weeks ago she started with some allergy things.     Had a little catch in her back.     She has had some issues with urinary retention from the allergy medications before.     Anxiety with occasional prn xanax use. 30 has lasted 3 months. She would like a refill. She is due for drug screen.         .  Patient Active Problem List   Diagnosis    Mitral valve prolapse    Family history of colon cancer    Gastroesophageal reflux disease without esophagitis    Hyperthyroidism    Osteoporosis    Seasonal allergies    Panic disorder    Paroxysmal atrial fibrillation    COVID-19    Chondral defect of right patella    Situational depression    Tonsillith     Anthony has a current medication list which includes the following prescription(s): alendronate, ammonium lactate, ammonium lactate, aspirin, azelastine, caltrate 600 plus d, clotrimazole-betamethasone 1-0.05%, cyclobenzaprine, diphenhydramine, flecainide, fluticasone propionate, hydrocortisone, hydrocortisone-pramoxine, mg-plus-protein, metoprolol succinate, montelukast, multivitamin, pepcid, triamcinolone acetonide 0.5%, tyrvaya, urea, urea-hydrophilic cream, vitamin d3, alprazolam, bepreve, fluorometholone 0.1%, and nitrofurantoin (macrocrystal-monohydrate).    Review of Systems   Genitourinary:  Positive for frequency.         Health Maintenance Due   Topic Date Due    Shingles Vaccine (1 of 2) Never done    RSV Vaccine (Age 60+ and Pregnant patients) (1 - 1-dose 60+ series) Never done    Influenza Vaccine (1) 09/01/2024    COVID-19 Vaccine (3 - 2023-24 season) 09/01/2024       Health Maintenance reviewed and discussed- None requested today.   Objective:      Physical Exam  Vitals and nursing note reviewed.   Constitutional:       General: She is not in acute distress.     Appearance: She is not ill-appearing.   Cardiovascular:      Rate and Rhythm: Normal rate and regular rhythm.      Heart sounds: No murmur heard.  Pulmonary:      Effort: Pulmonary effort is normal.      Breath sounds: Normal breath sounds. No wheezing.   Skin:     General: Skin is warm and dry.      Findings: No rash.   Neurological:      Mental Status: She is alert.   Psychiatric:         Mood and Affect: Mood normal.         Behavior: Behavior normal.         Assessment:       1. Acute cystitis without hematuria    2. Long-term use of high-risk medication    3. Long-term current use of benzodiazepine    4. Panic disorder        Plan:       1. Acute cystitis without hematuria  -     Urine culture  -     POCT Urinalysis(Instrument)  -     nitrofurantoin, macrocrystal-monohydrate, (MACROBID) 100 MG capsule; Take 1 capsule (100 mg total) by mouth 2 (two) times daily. for 7 days  Dispense: 14 capsule; Refill: 0    2. Long-term use of high-risk medication  -     POCT Urine Drug Screen (With BUP)    3. Long-term current use of benzodiazepine  -     POCT Urine Drug Screen (With BUP)    4. Panic disorder  -     ALPRAZolam (XANAX) 0.25 MG tablet; Take 1 tablet (0.25 mg total) by mouth daily as needed for Anxiety.  Dispense: 30 tablet; Refill: 0

## 2024-09-19 ENCOUNTER — PATIENT MESSAGE (OUTPATIENT)
Dept: FAMILY MEDICINE | Facility: CLINIC | Age: 64
End: 2024-09-19
Payer: COMMERCIAL

## 2024-10-15 ENCOUNTER — HOSPITAL ENCOUNTER (OUTPATIENT)
Dept: RADIOLOGY | Facility: HOSPITAL | Age: 64
Discharge: HOME | End: 2024-10-15
Attending: STUDENT IN AN ORGANIZED HEALTH CARE EDUCATION/TRAINING PROGRAM
Payer: COMMERCIAL

## 2024-10-15 DIAGNOSIS — M81.0 AGE-RELATED OSTEOPOROSIS WITHOUT CURRENT PATHOLOGICAL FRACTURE: ICD-10-CM

## 2024-10-15 PROCEDURE — 77080 DXA BONE DENSITY AXIAL: CPT | Mod: 26,,, | Performed by: RADIOLOGY

## 2024-10-15 PROCEDURE — 77080 DXA BONE DENSITY AXIAL: CPT | Mod: TC,PO

## 2024-10-30 DIAGNOSIS — M81.0 SENILE OSTEOPOROSIS: Primary | ICD-10-CM

## 2024-12-02 ENCOUNTER — LAB VISIT (OUTPATIENT)
Dept: LAB | Facility: HOSPITAL | Age: 64
End: 2024-12-02
Attending: STUDENT IN AN ORGANIZED HEALTH CARE EDUCATION/TRAINING PROGRAM
Payer: COMMERCIAL

## 2024-12-02 ENCOUNTER — OFFICE VISIT (OUTPATIENT)
Dept: ALLERGY | Facility: CLINIC | Age: 64
End: 2024-12-02
Payer: COMMERCIAL

## 2024-12-02 VITALS — WEIGHT: 179.69 LBS | BODY MASS INDEX: 25.73 KG/M2 | HEIGHT: 70 IN

## 2024-12-02 DIAGNOSIS — H10.423 SIMPLE CHRONIC CONJUNCTIVITIS OF BOTH EYES: ICD-10-CM

## 2024-12-02 DIAGNOSIS — J31.0 CHRONIC RHINITIS: Primary | ICD-10-CM

## 2024-12-02 DIAGNOSIS — J31.0 CHRONIC RHINITIS: ICD-10-CM

## 2024-12-02 PROCEDURE — 1159F MED LIST DOCD IN RCRD: CPT | Mod: CPTII,S$GLB,, | Performed by: ALLERGY & IMMUNOLOGY

## 2024-12-02 PROCEDURE — 36415 COLL VENOUS BLD VENIPUNCTURE: CPT | Mod: PO | Performed by: ALLERGY & IMMUNOLOGY

## 2024-12-02 PROCEDURE — 3008F BODY MASS INDEX DOCD: CPT | Mod: CPTII,S$GLB,, | Performed by: ALLERGY & IMMUNOLOGY

## 2024-12-02 PROCEDURE — 86003 ALLG SPEC IGE CRUDE XTRC EA: CPT | Mod: 59 | Performed by: ALLERGY & IMMUNOLOGY

## 2024-12-02 PROCEDURE — 86003 ALLG SPEC IGE CRUDE XTRC EA: CPT | Performed by: ALLERGY & IMMUNOLOGY

## 2024-12-02 PROCEDURE — 99204 OFFICE O/P NEW MOD 45 MIN: CPT | Mod: S$GLB,,, | Performed by: ALLERGY & IMMUNOLOGY

## 2024-12-02 PROCEDURE — 99999 PR PBB SHADOW E&M-EST. PATIENT-LVL III: CPT | Mod: PBBFAC,,, | Performed by: ALLERGY & IMMUNOLOGY

## 2024-12-02 PROCEDURE — 1160F RVW MEDS BY RX/DR IN RCRD: CPT | Mod: CPTII,S$GLB,, | Performed by: ALLERGY & IMMUNOLOGY

## 2024-12-02 PROCEDURE — 3044F HG A1C LEVEL LT 7.0%: CPT | Mod: CPTII,S$GLB,, | Performed by: ALLERGY & IMMUNOLOGY

## 2024-12-02 RX ORDER — LOTEPREDNOL ETABONATE 5 MG/ML
SUSPENSION/ DROPS OPHTHALMIC
COMMUNITY
Start: 2024-11-18

## 2024-12-02 RX ORDER — TRAZODONE HYDROCHLORIDE 50 MG/1
TABLET ORAL
COMMUNITY
Start: 2024-10-13

## 2024-12-02 RX ORDER — OLOPATADINE HYDROCHLORIDE 665 UG/1
1 SPRAY NASAL 2 TIMES DAILY
Qty: 30.5 G | Refills: 12 | Status: SHIPPED | OUTPATIENT
Start: 2024-12-02 | End: 2025-12-02

## 2024-12-02 RX ORDER — AZELASTINE HYDROCHLORIDE 0.5 MG/ML
1 SOLUTION/ DROPS OPHTHALMIC 2 TIMES DAILY
Qty: 6 ML | Refills: 12 | Status: SHIPPED | OUTPATIENT
Start: 2024-12-02 | End: 2025-12-02

## 2024-12-02 NOTE — PROGRESS NOTES
Subjective:       Patient ID: Anthony Apple is a 64 y.o. female.    Chief Complaint:  Allergies      65 yo woman presents for new patient evaluation of possible allergies. She states she has had testing in past, last about 2016. She seemed better for awhile after that but now seems to be getting worse. She has very itchy dry gritty eyes and lids swell. She has fatigue and aches. Her ears get full and pressure. No sneeze or runny nose but has very thick PND. Has stuffy nose, pressure and HA. No chest symptoms. Does have itchy nose as well. Has all year, no season worse. worse at night. Worse around dogs and she thinks more with milk ingestion. Uses saline nasal spray often. Use Flonase and helps but causes HA so just changed to sensimist. Claritin helps some, feels talat when takes but dries her out too much. Singulair helps but also drying and some depression so only takes with bad flares. Azelastine caused HA. No H/O insect or latex allergy. No asthma or eczema. She does have A fib and is on beta blocker for that. Used to take sudafed often but has to avoid now.         Environmental History: see history section for home environment  Review of Systems   Constitutional:  Positive for fatigue.   HENT:  Positive for congestion, ear pain, facial swelling, postnasal drip, sinus pressure and sore throat. Negative for mouth sores, nosebleeds, rhinorrhea and sneezing.    Eyes:  Positive for discharge, redness and itching.   Respiratory:  Negative for cough, chest tightness, shortness of breath and wheezing.    Musculoskeletal:  Positive for arthralgias.   Skin:  Negative for color change and rash.   Neurological:  Positive for headaches.        Objective:      Physical Exam  Vitals and nursing note reviewed.   Constitutional:       General: She is not in acute distress.     Appearance: Normal appearance. She is not ill-appearing.   HENT:      Nose: No rhinorrhea.   Eyes:      General:         Right eye: No discharge.          Left eye: No discharge.      Conjunctiva/sclera: Conjunctivae normal.   Pulmonary:      Effort: Pulmonary effort is normal. No respiratory distress.   Abdominal:      General: There is no distension.   Skin:     General: Skin is warm and dry.      Findings: No erythema or rash.   Neurological:      Mental Status: She is alert and oriented to person, place, and time.   Psychiatric:         Mood and Affect: Mood normal.         Behavior: Behavior normal.         Laboratory:   none performed   Assessment:       1. Chronic rhinitis    2. Simple chronic conjunctivitis of both eyes         Plan:       Rhinitis and conjunctivitis- advised can be allergic vs chronic non allergic, will send immunocaps to assess  Continue fluticasone sensimist 2 SEN daily  Add olopatadine 1 SEN BID  Loratadine daily and benadryl as needed  Optivar eye drop 1 drop each eye BID as needed  Phone review, advised pt she is not candidate for IT given A fib and need for beta blocker    I spent a total of 45 minutes on the day of the visit.  This includes face to face time and non-face to face time preparing to see the patient (eg, review of tests), obtaining and/or reviewing separately obtained history, documenting clinical information in the electronic or other health record, independently interpreting results and communicating results to the patient/family/caregiver, or care coordinator.

## 2024-12-05 LAB
A ALTERNATA IGE QN: <0.1 KU/L
A FUMIGATUS IGE QN: <0.1 KU/L
ALLERGEN CHAETOMIUM GLOBOSUM IGE: <0.1 KU/L
ALLERGEN WALNUT TREE IGE: <0.1 KU/L
ALLERGEN WHITE PINE TREE IGE: <0.1 KU/L
ALMOND IGE QN: <0.1 KU/L
BAHIA GRASS IGE QN: <0.1 KU/L
BALD CYPRESS IGE QN: <0.1 KU/L
BERMUDA GRASS IGE QN: <0.1 KU/L
C HERBARUM IGE QN: <0.1 KU/L
C LUNATA IGE QN: <0.1 KU/L
CAT DANDER IGE QN: <0.1 KU/L
COMMON RAGWEED IGE QN: <0.1 KU/L
COTTONWOOD IGE QN: <0.1 KU/L
COW MILK IGE QN: 0.44 KU/L
D FARINAE IGE QN: <0.1 KU/L
D PTERONYSS IGE QN: <0.1 KU/L
DEPRECATED LONDON PLANE IGE RAST QL: NORMAL
DEPRECATED TIMOTHY IGE RAST QL: NORMAL
DOG DANDER IGE QN: <0.1 KU/L
EGG WHITE IGE QN: <0.1 KU/L
ELDER IGE QN: <0.1 KU/L
ENGL PLANTAIN IGE QN: <0.1 KU/L
HORSE DANDER IGE QN: <0.1 KU/L
JOHNSON GRASS IGE QN: <0.1 KU/L
LONDON PLANE IGE QN: <0.1 KU/L
MUGWORT IGE QN: <0.1 KU/L
OAT IGE QN: <0.1 KU/L
P NOTATUM IGE QN: <0.1 KU/L
PEANUT IGE QN: <0.1 KU/L
PECAN/HICK NUT IGE QN: <0.1 KU/L
PECAN/HICK TREE IGE QN: <0.1 KU/L
RAST CLASS: ABNORMAL
RAST CLASS: NORMAL
S ROSTRATA IGE QN: <0.1 KU/L
SALTWORT IGE QN: <0.1 KU/L
SESAME SEED IGE QN: <0.1 KU/L
SILVER BIRCH IGE QN: <0.1 KU/L
SOYBEAN IGE QN: <0.1 KU/L
SUNFLOWER SEED IGE QN: <0.1 KU/L
TIMOTHY IGE QN: <0.1 KU/L
WEST RAGWEED IGE QN: <0.1 KU/L
WHEAT IGE QN: <0.1 KU/L
WHITE OAK IGE QN: <0.1 KU/L
WILLOW IGE QN: <0.1 KU/L

## 2024-12-10 ENCOUNTER — OFFICE VISIT (OUTPATIENT)
Dept: FAMILY MEDICINE | Facility: CLINIC | Age: 64
End: 2024-12-10
Payer: COMMERCIAL

## 2024-12-10 ENCOUNTER — PATIENT MESSAGE (OUTPATIENT)
Dept: ALLERGY | Facility: CLINIC | Age: 64
End: 2024-12-10
Payer: COMMERCIAL

## 2024-12-10 VITALS
DIASTOLIC BLOOD PRESSURE: 80 MMHG | WEIGHT: 175.5 LBS | HEIGHT: 70 IN | RESPIRATION RATE: 16 BRPM | BODY MASS INDEX: 25.13 KG/M2 | SYSTOLIC BLOOD PRESSURE: 118 MMHG | OXYGEN SATURATION: 99 % | HEART RATE: 79 BPM

## 2024-12-10 DIAGNOSIS — Z79.899 LONG-TERM USE OF HIGH-RISK MEDICATION: Primary | ICD-10-CM

## 2024-12-10 DIAGNOSIS — F41.0 PANIC DISORDER: ICD-10-CM

## 2024-12-10 DIAGNOSIS — M54.41 CHRONIC MIDLINE LOW BACK PAIN WITH RIGHT-SIDED SCIATICA: ICD-10-CM

## 2024-12-10 DIAGNOSIS — R31.9 HEMATURIA, UNSPECIFIED TYPE: ICD-10-CM

## 2024-12-10 DIAGNOSIS — E05.90 HYPERTHYROIDISM: ICD-10-CM

## 2024-12-10 DIAGNOSIS — K52.9 GASTROENTERITIS: ICD-10-CM

## 2024-12-10 DIAGNOSIS — G89.29 CHRONIC MIDLINE LOW BACK PAIN WITH RIGHT-SIDED SCIATICA: ICD-10-CM

## 2024-12-10 LAB
AMP AMPHETAMINE 1000 NM/ML POC: NEGATIVE
BAR BARBITURATES 300 NG/ML POC: NEGATIVE
BUP BUPRENORPHINE 10 NG/ML POC: NEGATIVE
BZO BENZODIAZEPINES 300 NG/ML POC: NEGATIVE
COC COCAINE 300 NG/ML POC: NEGATIVE
CREATININE (CR) POC: 50
CTP QC/QA: YES
MET METHAMPHETAMINE 1000 NG/ML POC: NEGATIVE
MOP/OPI300 MORPHINE 300 NG/ML POC: NEGATIVE
MTD METHADONE 300 NG/ML POC: NEGATIVE
OXIDANT (OX) POC: NEGATIVE
OXY OXYCODONE 100 NG/ML POC: NEGATIVE
SPECIFIC GRAVITY (SG) POC: 1.02
TEMPERATURE (°F) POC: 94
THC MARIJUANA 50 NG/ML POC: NEGATIVE

## 2024-12-10 PROCEDURE — 3044F HG A1C LEVEL LT 7.0%: CPT | Mod: S$GLB,,, | Performed by: STUDENT IN AN ORGANIZED HEALTH CARE EDUCATION/TRAINING PROGRAM

## 2024-12-10 PROCEDURE — 3079F DIAST BP 80-89 MM HG: CPT | Mod: S$GLB,,, | Performed by: STUDENT IN AN ORGANIZED HEALTH CARE EDUCATION/TRAINING PROGRAM

## 2024-12-10 PROCEDURE — 1159F MED LIST DOCD IN RCRD: CPT | Mod: S$GLB,,, | Performed by: STUDENT IN AN ORGANIZED HEALTH CARE EDUCATION/TRAINING PROGRAM

## 2024-12-10 PROCEDURE — 99999 PR PBB SHADOW E&M-EST. PATIENT-LVL V: CPT | Mod: PBBFAC,,, | Performed by: STUDENT IN AN ORGANIZED HEALTH CARE EDUCATION/TRAINING PROGRAM

## 2024-12-10 PROCEDURE — 80305 DRUG TEST PRSMV DIR OPT OBS: CPT | Mod: QW,S$GLB,, | Performed by: STUDENT IN AN ORGANIZED HEALTH CARE EDUCATION/TRAINING PROGRAM

## 2024-12-10 PROCEDURE — 3008F BODY MASS INDEX DOCD: CPT | Mod: S$GLB,,, | Performed by: STUDENT IN AN ORGANIZED HEALTH CARE EDUCATION/TRAINING PROGRAM

## 2024-12-10 PROCEDURE — 81000 URINALYSIS NONAUTO W/SCOPE: CPT | Performed by: STUDENT IN AN ORGANIZED HEALTH CARE EDUCATION/TRAINING PROGRAM

## 2024-12-10 PROCEDURE — 3074F SYST BP LT 130 MM HG: CPT | Mod: S$GLB,,, | Performed by: STUDENT IN AN ORGANIZED HEALTH CARE EDUCATION/TRAINING PROGRAM

## 2024-12-10 PROCEDURE — 99214 OFFICE O/P EST MOD 30 MIN: CPT | Mod: S$GLB,,, | Performed by: STUDENT IN AN ORGANIZED HEALTH CARE EDUCATION/TRAINING PROGRAM

## 2024-12-10 NOTE — PROGRESS NOTES
Subjective:       Patient ID: Anthony Apple is a 64 y.o. female.    Chief Complaint: Follow-up      Overall doing well  She had a gastroenteritis with n/v/d.  This caused tachycardia but it has resolved.    No other acute complaints.    She is eating well and staying active.    She has had workup for hematuria in the past but didn't get the ct  She cannot see the blood but it is always in the urinalysis  She is not sure if she         .  Patient Active Problem List   Diagnosis    Mitral valve prolapse    Family history of colon cancer    Gastroesophageal reflux disease without esophagitis    Hyperthyroidism    Osteoporosis    Seasonal allergies    Panic disorder    Paroxysmal atrial fibrillation    COVID-19    Chondral defect of right patella    Situational depression    Tonsillith     Anthony has a current medication list which includes the following prescription(s): alprazolam, ammonium lactate, aspirin, azelastine, caltrate 600 plus d, clotrimazole-betamethasone 1-0.05%, cyclobenzaprine, diphenhydramine, flecainide, fluticasone propionate, loteprednol, mg-plus-protein, metoprolol succinate, montelukast, multivitamin, olopatadine, pepcid, trazodone, triamcinolone acetonide 0.5%, urea, urea-hydrophilic cream, vitamin d3, alendronate, ammonium lactate, azelastine, bepreve, fluorometholone 0.1%, hydrocortisone, hydrocortisone-pramoxine, and tyrvaya.    Review of Systems   Constitutional:  Negative for activity change and appetite change.   Respiratory:  Negative for shortness of breath.    Cardiovascular:  Negative for chest pain.   Gastrointestinal:  Negative for abdominal pain.   Genitourinary:  Negative for dysuria.   Integumentary:  Negative for rash.   Psychiatric/Behavioral:  Negative for depressed mood and sleep disturbance. The patient is not nervous/anxious.          Health Maintenance Due   Topic Date Due    Shingles Vaccine (1 of 2) Never done    RSV Vaccine (Age 60+ and Pregnant patients) (1 - Risk 60-74  years 1-dose series) Never done    Influenza Vaccine (1) 09/01/2024    COVID-19 Vaccine (3 - 2024-25 season) 09/01/2024      Health Maintenance reviewed and discussed- encouraged vaccines.     Objective:      Physical Exam  Constitutional:       General: She is not in acute distress.     Appearance: Normal appearance. She is not ill-appearing.   Eyes:      Conjunctiva/sclera: Conjunctivae normal.   Cardiovascular:      Rate and Rhythm: Normal rate and regular rhythm.      Heart sounds: Normal heart sounds. No murmur heard.  Pulmonary:      Effort: Pulmonary effort is normal. No respiratory distress.      Breath sounds: Normal breath sounds. No wheezing or rales.   Musculoskeletal:      Right lower leg: No edema.      Left lower leg: No edema.   Skin:     General: Skin is warm and dry.   Neurological:      Mental Status: She is alert. Mental status is at baseline.      Gait: Gait normal.   Psychiatric:         Mood and Affect: Mood normal.         Behavior: Behavior normal.         Thought Content: Thought content normal.         Judgment: Judgment normal.         Assessment:       1. Long-term use of high-risk medication    2. Panic disorder    3. Hyperthyroidism    4. Gastroenteritis    5. Hematuria, unspecified type        Plan:       1. Long-term use of high-risk medication  -     POCT Urine Drug Screen (With BUP)    2. Panic disorder  Assessment & Plan:  Uses prn xanax and rarely fills  Uds today  Controlled med agreement today   reviewed and no red flags      3. Hyperthyroidism  Assessment & Plan:  Lab Results   Component Value Date    TSH 1.720 08/20/2024     Stable. Continue current medications and regular followup.        4. Gastroenteritis  Comments:  doing better. not completlyl resolved    5. Hematuria, unspecified type  -     Urinalysis Microscopic

## 2024-12-10 NOTE — ASSESSMENT & PLAN NOTE
Lab Results   Component Value Date    TSH 1.720 08/20/2024     Stable. Continue current medications and regular followup.

## 2024-12-11 LAB
AMORPH CRY URNS QL MICRO: ABNORMAL
BACTERIA #/AREA URNS HPF: ABNORMAL /HPF
CAOX CRY URNS QL MICRO: ABNORMAL
MICROSCOPIC COMMENT: ABNORMAL

## 2024-12-19 ENCOUNTER — PATIENT MESSAGE (OUTPATIENT)
Dept: FAMILY MEDICINE | Facility: CLINIC | Age: 64
End: 2024-12-19
Payer: COMMERCIAL

## 2025-02-18 ENCOUNTER — PATIENT MESSAGE (OUTPATIENT)
Dept: FAMILY MEDICINE | Facility: CLINIC | Age: 65
End: 2025-02-18
Payer: COMMERCIAL

## 2025-02-18 DIAGNOSIS — F41.0 PANIC DISORDER: ICD-10-CM

## 2025-02-18 RX ORDER — ALPRAZOLAM 0.25 MG/1
0.25 TABLET ORAL DAILY PRN
Qty: 30 TABLET | Refills: 0 | Status: SHIPPED | OUTPATIENT
Start: 2025-02-18

## 2025-02-18 NOTE — TELEPHONE ENCOUNTER
No care due was identified.  Health [facility] Embedded Care Due Messages. Reference number: [id number].   2/18/2025 11:22:08 AM CST

## 2025-02-19 NOTE — TELEPHONE ENCOUNTER
Controlled med agreement in place   reviewed and no red flags- appropriate fill history.  UDS and visit up to date.

## 2025-03-12 ENCOUNTER — PATIENT MESSAGE (OUTPATIENT)
Dept: FAMILY MEDICINE | Facility: CLINIC | Age: 65
End: 2025-03-12
Payer: COMMERCIAL

## 2025-04-04 DIAGNOSIS — Z12.31 OTHER SCREENING MAMMOGRAM: ICD-10-CM

## 2025-04-13 ENCOUNTER — HOSPITAL ENCOUNTER (EMERGENCY)
Facility: HOSPITAL | Age: 65
Discharge: HOME | End: 2025-04-13
Attending: EMERGENCY MEDICINE
Payer: COMMERCIAL

## 2025-04-13 VITALS
RESPIRATION RATE: 18 BRPM | HEART RATE: 65 BPM | BODY MASS INDEX: 24.62 KG/M2 | OXYGEN SATURATION: 99 % | HEIGHT: 70 IN | TEMPERATURE: 98 F | WEIGHT: 172 LBS | SYSTOLIC BLOOD PRESSURE: 161 MMHG | DIASTOLIC BLOOD PRESSURE: 78 MMHG

## 2025-04-13 DIAGNOSIS — S22.31XA CLOSED FRACTURE OF ONE RIB OF RIGHT SIDE, INITIAL ENCOUNTER: ICD-10-CM

## 2025-04-13 DIAGNOSIS — S09.90XA INJURY OF HEAD, INITIAL ENCOUNTER: Primary | ICD-10-CM
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
## 2025-04-13 PROCEDURE — 25000003 PHARM REV CODE 250: Performed by: EMERGENCY MEDICINE

## 2025-04-13 PROCEDURE — 99284 EMERGENCY DEPT VISIT MOD MDM: CPT | Mod: 25

## 2025-04-13 RX ORDER — OXYCODONE AND ACETAMINOPHEN 5; 325 MG/1; MG/1
2 TABLET ORAL
Refills: 0 | Status: COMPLETED | OUTPATIENT
Start: 2025-04-13 | End: 2025-04-13

## 2025-04-13 RX ORDER — OXYCODONE AND ACETAMINOPHEN 5; 325 MG/1; MG/1
1 TABLET ORAL EVERY 6 HOURS PRN
Qty: 16 TABLET | Refills: 0 | Status: SHIPPED | OUTPATIENT
Start: 2025-04-13

## 2025-04-13 RX ORDER — METHOCARBAMOL 500 MG/1
1000 TABLET, FILM COATED ORAL
Status: COMPLETED | OUTPATIENT
Start: 2025-04-13 | End: 2025-04-13

## 2025-04-13 RX ADMIN — METHOCARBAMOL 1000 MG: 500 TABLET ORAL at 07:04

## 2025-04-13 RX ADMIN — OXYCODONE HYDROCHLORIDE AND ACETAMINOPHEN 2 TABLET: 5; 325 TABLET ORAL at 07:04

## 2025-04-13 NOTE — DISCHARGE INSTRUCTIONS
Imaging Results              CT Chest Abdomen Pelvis Without Contrast (XPD) (Final result)  Result time 04/13/25 07:53:33      Final result by Supa Kwon DO (04/13/25 07:53:33)                   Impression:      1. Acute transverse fracture with full bone shaft width anterior displacement of the posterior right 11th rib.  2. Regional right lower back subcutaneous fat stranding is felt to reflect contusion.  3. Otherwise there are no acute thoracic or intra-abdominal abnormalities.      Electronically signed by: Supa Kwon  Date:    04/13/2025  Time:    07:53               Narrative:      CMS MANDATED QUALITY DATA - CT RADIATION - 436    All CT scans at this facility utilize dose modulation, iterative reconstruction, and/or weight based dosing when appropriate to reduce radiation dose to as low as reasonably achievable.    EXAMINATION:  CT CHEST ABDOMEN PELVIS WITHOUT CONTRAST(XPD)    CLINICAL HISTORY:  Polytrauma, blunt;    TECHNIQUE:  Chest, abdomen and pelvis CT without IV contrast    COMPARISON:  None.    FINDINGS:  The central tracheobronchial tree is patent. There is dependent subsegmental atelectasis of the lungs. Calcified granuloma of the left lower lobe. No consolidation or pleural effusion.    The heart is normal size.  There is ectasia of the ascending thoracic aorta measuring up to 3.8 cm in diameter.  No enlarged mediastinal lymph nodes or mass.    The liver is normal size.  There are no gross hepatic lesions.  The gallbladder and common bile duct are within normal limits.  The pancreas, spleen and adrenal glands are unremarkable.  The kidneys are normal size.  There is no hydronephrosis or nephrolithiasis.  The ureters are normal caliber without obstructions.  The bladder is fluid filled with no focal wall abnormalities.  Uterus and adnexal structures are grossly unremarkable.  Pelvic phleboliths noted.    The large and small bowel are normal caliber.  The appendix is normal.  There is no  bowel wall thickening.  The stomach is decompressed and grossly unremarkable.    The abdominal aorta is normal caliber with mild calcified plaque formation.  There are no enlarged intra-abdominal lymph nodes.  No intraperitoneal free fluid.    There is no acute osseous abnormality.  There is an acute transverse fracture with full bone shaft width anterior displacement of the posterior right 11th rib.  There is subcutaneous fat stranding in the right lower back, felt to reflect contusion.                                       CT Head Without Contrast (Final result)  Result time 04/13/25 07:44:34      Final result by Supa Kwon DO (04/13/25 07:44:34)                   Impression:      1. No acute intracranial abnormality.  2. Right frontal subcutaneous scalp hematoma.      Electronically signed by: Supa Kwon  Date:    04/13/2025  Time:    07:44               Narrative:      Chronic lacunar infarct CMS MANDATED QUALITY DATA - CT RADIATION - 436    All CT scans at this facility utilize dose modulation, iterative reconstruction, and/or weight based dosing when appropriate to reduce radiation dose to as low as reasonably achievable.    EXAMINATION:  CT HEAD WITHOUT CONTRAST    CLINICAL HISTORY:  Head trauma, moderate-severe;    TECHNIQUE:  Head CT without IV contrast.    COMPARISON:  None    FINDINGS:  Gray-white differentiation is maintained without hemorrhage, midline shift, or mass effect.The ventricles and cisterns are maintained.  Of the right basal ganglia noted.  Calvarium is intact. Visualized sinuses are clear. Right frontal subcutaneous scalp hematoma observed.

## 2025-04-13 NOTE — ED PROVIDER NOTES
Encounter Date: 4/13/2025       History     Chief Complaint   Patient presents with    Fall     Pt said she woke up to got to the bathroom and when turning slipped and fell.  Pt reports injuring the R side of her forehead and R side middle lower back in ribs.  Pt denies any loc     64-year-old female presented emergency department with headache after a fall.  Patient slipped and fell and hit her head.  Patient has a hematoma on the right side of her forehead.  Patient also has pain and bruising in the lower right ribcage region and right flank region.  Denies abdominal pain.  Patient states the pain is worse with movement and worse when she takes a deep breath.  Denies any other complaints.  Denies any syncopal episode.  Denies chest pain or shortness of breath or abdominal pain or weakness or numbness or fever or chills.  Patient has history of paroxysmal atrial fibrillation but not on blood thinners.  Denies loss of consciousness.      Review of patient's allergies indicates:   Allergen Reactions    Cefdinir Anaphylaxis, Rash and Shortness Of Breath    Sulfamethoxazole-trimethoprim Itching, Other (See Comments) and Rash     Past Medical History:   Diagnosis Date    Atrial fibrillation     GERD (gastroesophageal reflux disease)     Hyperthyroidism     Osteoporosis     Panic attack     Seasonal allergies      Past Surgical History:   Procedure Laterality Date    none       Family History   Problem Relation Name Age of Onset    Stroke Mother      Arthritis Mother      Thyroid disease Mother      Colon cancer Father      Atrial fibrillation Father      Thyroid disease Father       Social History[1]  Review of Systems   Constitutional: Negative.    HENT: Negative.     Eyes: Negative.    Respiratory: Negative.     Cardiovascular: Negative.  Negative for chest pain.   Gastrointestinal: Negative.    Endocrine: Negative.    Genitourinary: Negative.    Musculoskeletal:  Positive for back pain.   Skin: Negative.     Allergic/Immunologic: Negative.    Neurological:  Positive for headaches.   Hematological: Negative.    Psychiatric/Behavioral: Negative.     All other systems reviewed and are negative.      Physical Exam     Initial Vitals [04/13/25 0640]   BP Pulse Resp Temp SpO2   (!) 154/83 78 18 97.4 °F (36.3 °C) 100 %      MAP       --         Physical Exam    Nursing note and vitals reviewed.  Constitutional: She appears well-developed and well-nourished.   HENT:   Head: Normocephalic.   Right Ear: External ear normal.   Left Ear: External ear normal.   Nose: Nose normal. Mouth/Throat: Oropharynx is clear and moist.   Hematoma on the right forehead region   Eyes: Conjunctivae and EOM are normal. Pupils are equal, round, and reactive to light.   Neck: Neck supple. No thyromegaly present. No tracheal deviation present. No JVD present.   Normal range of motion.  Cardiovascular:  Normal rate, regular rhythm, normal heart sounds and intact distal pulses.           No murmur heard.  Pulmonary/Chest: Breath sounds normal. No stridor. No respiratory distress. She has no wheezes. She has no rales.   Abdominal: Abdomen is soft. Bowel sounds are normal. There is no abdominal tenderness.   Musculoskeletal:         General: Tenderness present. No edema. Normal range of motion.      Cervical back: Normal range of motion and neck supple.      Comments: Bruising and ecchymosis noted in the right lower ribcage and upper flank region.     Neurological: She is alert and oriented to person, place, and time. She has normal strength. GCS score is 15. GCS eye subscore is 4. GCS verbal subscore is 5. GCS motor subscore is 6.   Skin: Skin is warm. Capillary refill takes less than 2 seconds.   Psychiatric: She has a normal mood and affect. Thought content normal.         ED Course   Procedures  Labs Reviewed - No data to display       Imaging Results              CT Chest Abdomen Pelvis Without Contrast (XPD) (Final result)  Result time 04/13/25  07:53:33      Final result by Supa Kwon DO (04/13/25 07:53:33)                   Impression:      1. Acute transverse fracture with full bone shaft width anterior displacement of the posterior right 11th rib.  2. Regional right lower back subcutaneous fat stranding is felt to reflect contusion.  3. Otherwise there are no acute thoracic or intra-abdominal abnormalities.      Electronically signed by: Supa Kwon  Date:    04/13/2025  Time:    07:53               Narrative:      CMS MANDATED QUALITY DATA - CT RADIATION - 436    All CT scans at this facility utilize dose modulation, iterative reconstruction, and/or weight based dosing when appropriate to reduce radiation dose to as low as reasonably achievable.    EXAMINATION:  CT CHEST ABDOMEN PELVIS WITHOUT CONTRAST(XPD)    CLINICAL HISTORY:  Polytrauma, blunt;    TECHNIQUE:  Chest, abdomen and pelvis CT without IV contrast    COMPARISON:  None.    FINDINGS:  The central tracheobronchial tree is patent. There is dependent subsegmental atelectasis of the lungs. Calcified granuloma of the left lower lobe. No consolidation or pleural effusion.    The heart is normal size.  There is ectasia of the ascending thoracic aorta measuring up to 3.8 cm in diameter.  No enlarged mediastinal lymph nodes or mass.    The liver is normal size.  There are no gross hepatic lesions.  The gallbladder and common bile duct are within normal limits.  The pancreas, spleen and adrenal glands are unremarkable.  The kidneys are normal size.  There is no hydronephrosis or nephrolithiasis.  The ureters are normal caliber without obstructions.  The bladder is fluid filled with no focal wall abnormalities.  Uterus and adnexal structures are grossly unremarkable.  Pelvic phleboliths noted.    The large and small bowel are normal caliber.  The appendix is normal.  There is no bowel wall thickening.  The stomach is decompressed and grossly unremarkable.    The abdominal aorta is normal  caliber with mild calcified plaque formation.  There are no enlarged intra-abdominal lymph nodes.  No intraperitoneal free fluid.    There is no acute osseous abnormality.  There is an acute transverse fracture with full bone shaft width anterior displacement of the posterior right 11th rib.  There is subcutaneous fat stranding in the right lower back, felt to reflect contusion.                                       CT Head Without Contrast (Final result)  Result time 04/13/25 07:44:34      Final result by Supa Kwon DO (04/13/25 07:44:34)                   Impression:      1. No acute intracranial abnormality.  2. Right frontal subcutaneous scalp hematoma.      Electronically signed by: Supa Kwon  Date:    04/13/2025  Time:    07:44               Narrative:      Chronic lacunar infarct CMS MANDATED QUALITY DATA - CT RADIATION - 436    All CT scans at this facility utilize dose modulation, iterative reconstruction, and/or weight based dosing when appropriate to reduce radiation dose to as low as reasonably achievable.    EXAMINATION:  CT HEAD WITHOUT CONTRAST    CLINICAL HISTORY:  Head trauma, moderate-severe;    TECHNIQUE:  Head CT without IV contrast.    COMPARISON:  None    FINDINGS:  Gray-white differentiation is maintained without hemorrhage, midline shift, or mass effect.The ventricles and cisterns are maintained.  Of the right basal ganglia noted.  Calvarium is intact. Visualized sinuses are clear. Right frontal subcutaneous scalp hematoma observed.                                       Medications   oxyCODONE-acetaminophen 5-325 mg per tablet 2 tablet (2 tablets Oral Given 4/13/25 0707)   methocarbamoL tablet 1,000 mg (1,000 mg Oral Given 4/13/25 0706)     Medical Decision Making  64-year-old female presented emergency department after a fall.  Patient has evidence of head injury and ecchymosis and bruising and tenderness in the right lower ribcage and upper flank region.  No actual mid flank  tenderness.  No abdominal tenderness.  No anterior chest wall tenderness.  Patient likely has bruising and I do not suspect a deep vascular injury.  Will do CT of head and CT of chest and abdomen for evaluation of patient's injury.  Pain control and anticipate workup would be negative and patient would be discharged home.  Will re-evaluate.    Amount and/or Complexity of Data Reviewed  Radiology: ordered. Decision-making details documented in ED Course.  Discussion of management or test interpretation with external provider(s): CT scan did show evidence of left rib fracture on the right.  Head CT did not show intracranial hemorrhage.  Pain control.  Incentive spirometer given.  Discharged with return precautions and instructions and follow-up with primary care.  Fall precautions given.    Risk  Prescription drug management.                                      Clinical Impression:  Final diagnoses:  [S09.90XA] Injury of head, initial encounter (Primary)  [S22.31XA] Closed fracture of one rib of right side, initial encounter  [W19.XXXA] Fall, initial encounter          ED Disposition Condition    Discharge Stable          ED Prescriptions       Medication Sig Dispense Start Date End Date Auth. Provider    oxyCODONE-acetaminophen (PERCOCET) 5-325 mg per tablet Take 1 tablet by mouth every 6 (six) hours as needed for Pain. 16 tablet 4/13/2025 -- Kira Smith MD          Follow-up Information       Follow up With Specialties Details Why Contact Info    Cornelia Bunrs MD Family Medicine In 2 days  [address]  [address]  [facility] [address] [phone]                   [1]   Social History  Tobacco Use    Smoking status: Never    Smokeless tobacco: Never   Substance Use Topics    Alcohol use: Yes     Alcohol/week: 2.0 standard drinks of alcohol     Types: 1 Glasses of wine, 1 Cans of beer per week     Comment: on occasion     Drug use: Not Currently        Kira Smith MD  04/13/25 0872

## 2025-04-14 ENCOUNTER — TELEPHONE (OUTPATIENT)
Facility: OTHER | Age: 65
End: 2025-04-14
Payer: COMMERCIAL

## 2025-04-14 RX ORDER — ONDANSETRON 4 MG/1
4 TABLET, ORALLY DISINTEGRATING ORAL 2 TIMES DAILY
Qty: 10 TABLET | Refills: 0 | Status: SHIPPED | OUTPATIENT
Start: 2025-04-14

## 2025-04-14 NOTE — PROGRESS NOTES
Patient seen in the ED on 4/13/2025 for fall.  Patient's call escalated to post ED text tracker.  Spoke with patient to assess for any additional concerns or follow up needs.  Patient reported feeling nauseated after taking pain medication.  Patient has existing PCP appointment scheduled for 4/16/2025.  In-basket staff message sent to Ashly Webb MD clinical support staff for patient follow up for further assistance.  Patient informed someone from PCP clinic will reach out for further assistance.  No further needs noted.  Encounter closed at this time.

## 2025-04-14 NOTE — TELEPHONE ENCOUNTER
No care due was identified.  [facility] Embedded Care Due Messages. Reference number: [id number].   4/14/2025 2:10:23 PM CDT

## 2025-04-14 NOTE — TELEPHONE ENCOUNTER
----- Message from Tessa Mary sent at 4/14/2025  1:14 PM CDT -----  Regarding: Medication request  Good afternoon,Patient seen in the ED on 4/13/2025 for fall.  Patient reported feeling nauseated after taking pain medication.  Patient has appointment in the clinic on 4/16/2025 with Ashly Webb MD.  Patient would like a call back for further assistance with something for nausea. Thanks in advance,Tessa Mary

## 2025-04-16 ENCOUNTER — OFFICE VISIT (OUTPATIENT)
Dept: FAMILY MEDICINE | Facility: CLINIC | Age: 65
End: 2025-04-16
Payer: COMMERCIAL

## 2025-04-16 ENCOUNTER — PATIENT MESSAGE (OUTPATIENT)
Dept: FAMILY MEDICINE | Facility: CLINIC | Age: 65
End: 2025-04-16

## 2025-04-16 VITALS
HEART RATE: 62 BPM | BODY MASS INDEX: 25.03 KG/M2 | OXYGEN SATURATION: 95 % | HEIGHT: 70 IN | SYSTOLIC BLOOD PRESSURE: 128 MMHG | DIASTOLIC BLOOD PRESSURE: 86 MMHG | WEIGHT: 174.88 LBS | RESPIRATION RATE: 18 BRPM
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
## 2025-04-16 DIAGNOSIS — Z12.31 SCREENING MAMMOGRAM FOR BREAST CANCER: ICD-10-CM

## 2025-04-16 DIAGNOSIS — R51.9 ACUTE NONINTRACTABLE HEADACHE, UNSPECIFIED HEADACHE TYPE: ICD-10-CM
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
## 2025-04-16 DIAGNOSIS — S06.0X0A CONCUSSION WITHOUT LOSS OF CONSCIOUSNESS, INITIAL ENCOUNTER: ICD-10-CM

## 2025-04-16 PROCEDURE — 99999 PR PBB SHADOW E&M-EST. PATIENT-LVL III: CPT | Mod: PBBFAC,,, | Performed by: FAMILY MEDICINE

## 2025-04-16 RX ORDER — BUTALBITAL, ACETAMINOPHEN AND CAFFEINE 50; 325; 40 MG/1; MG/1; MG/1
1 TABLET ORAL EVERY 4 HOURS PRN
Qty: 20 TABLET | Refills: 0 | Status: SHIPPED | OUTPATIENT
Start: 2025-04-16

## 2025-04-16 NOTE — PROGRESS NOTES
Subjective:       Patient ID: Anthony Apple is a 64 y.o. female.    Chief Complaint: ER Follow up (Had a fall on Sunday morning and fell on right side. She has some bruising on her right breast and right eye and head. She is having headaches and she has been tylenol but it does not give her any relief. )    History of Present Illness    CHIEF COMPLAINT:  Ms. Apple presents today following a fall with facial trauma and rib injury    HISTORY OF PRESENT ILLNESS:  She slipped and fell on a hard floor, denying loss of consciousness. She reports rib fractures in two places with clicking sensation on movement and pain with breathing. She experiences headaches since the evening of injury, noting Tylenol has been ineffective for pain management. She reports feeling woozy since headache onset. She notes a hard feeling in breast and has a back abrasion which she is treating with antibiotic ointment.    MEDICAL HISTORY:  She has a history of atrial fibrillation which occurs very infrequently and denies being currently in AFib. She also has a history of sciatica, previously managed with physical therapy.    MEDICATIONS:  She takes aspirin on alternating days for atrial fibrillation. She was previously on Fosamax for 5 years but discontinued in November and is currently taking a planned one-year break from the medication.      ROS:  Musculoskeletal: +pain with respiration  Skin: +lesion  Neurological: +headache, +dizziness           Problem List[1]  Anthony has a current medication list which includes the following prescription(s): alprazolam, ammonium lactate, aspirin, azelastine, caltrate 600 plus d, clotrimazole-betamethasone 1-0.05%, cyclobenzaprine, diphenhydramine, flecainide, fluticasone propionate, loteprednol, mg-plus-protein, metoprolol succinate, montelukast, multivitamin, olopatadine, ondansetron, oxycodone-acetaminophen, pepcid, trazodone, triamcinolone acetonide 0.5%, urea, urea-hydrophilic cream, vitamin d3,  "alendronate, ammonium lactate, azelastine, bepreve, butalbital-acetaminophen-caffeine -40 mg, fluorometholone 0.1%, hydrocortisone, hydrocortisone-pramoxine, and tyrvaya.        Health Maintenance Due   Topic Date Due    Mammogram  03/20/2025      Health Maintenance reviewed and discussed  Objective:      Vitals:    04/16/25 0737   BP: 128/86   Pulse: 62   Resp: 18   SpO2: 95%   Weight: 79.3 kg (174 lb 14.4 oz)   Height: 5' 10" (1.778 m)   PainSc:   7   PainLoc: Head     Body mass index is 25.1 kg/m².  Physical Exam  Vitals and nursing note reviewed.   Constitutional:       General: She is not in acute distress.     Appearance: She is not ill-appearing.   HENT:      Head:      Comments: Bruising around right eye. Swelling right temple  Cardiovascular:      Rate and Rhythm: Normal rate and regular rhythm.      Heart sounds: No murmur heard.  Pulmonary:      Effort: Pulmonary effort is normal.      Breath sounds: Normal breath sounds. No wheezing.   Skin:     General: Skin is warm and dry.      Findings: Bruising (right flank) present. No rash.   Neurological:      Mental Status: She is alert.   Psychiatric:         Mood and Affect: Mood normal.         Behavior: Behavior normal.         Assessment:       Assessment & Plan    1. Assessed fall injury, including rib fracture and facial bruising.  2. Evaluated CT head to rule out intracranial bleeding.  3. Considered post-concussive syndrome as cause of persistent headache.  4. Determined AFib status and current anticoagulation regimen.  5. Assessed breast injury from fall, suspecting fat necrosis.  6. Evaluated back abrasion, recommending appropriate topical treatment.           Plan:       1. Fall at home, initial encounter    2. Screening mammogram for breast cancer  -     Mammo Digital Screening Bilat w/ [facility] (XPD); Future; Expected date: 04/16/2025    3. Acute nonintractable headache, unspecified headache type    4. Concussion without loss of consciousness, " initial encounter  -     butalbital-acetaminophen-caffeine -40 mg (FIORICET, ESGIC) -40 mg per tablet; Take 1 tablet by mouth every 4 (four) hours as needed for Pain or Headaches.  Dispense: 20 tablet; Refill: 0         This note was generated with the assistance of ambient listening technology. Verbal consent was obtained by the patient and accompanying visitor(s) for the recording of patient appointment to facilitate this note. I attest to having reviewed and edited the generated note for accuracy, though some syntax or spelling errors may persist. Please contact the author of this note for any clarification.         [1]   Patient Active Problem List  Diagnosis    Mitral valve prolapse    Family history of colon cancer    Gastroesophageal reflux disease without esophagitis    Hyperthyroidism    Osteoporosis    Seasonal allergies    Panic disorder    Paroxysmal atrial fibrillation    COVID-19    Chondral defect of right patella    Situational depression    Tonsillith    Fall at home, initial encounter

## 2025-04-28 DIAGNOSIS — I48.0 PAF (PAROXYSMAL ATRIAL FIBRILLATION): ICD-10-CM

## 2025-04-28 RX ORDER — METOPROLOL SUCCINATE 25 MG/1
12.5 TABLET, EXTENDED RELEASE ORAL 2 TIMES DAILY
Qty: 45 TABLET | Refills: 3 | Status: SHIPPED | OUTPATIENT
Start: 2025-04-28 | End: 2026-04-28

## 2025-05-05 ENCOUNTER — OFFICE VISIT (OUTPATIENT)
Dept: FAMILY MEDICINE | Facility: CLINIC | Age: 65
End: 2025-05-05
Payer: COMMERCIAL

## 2025-05-05 VITALS — DIASTOLIC BLOOD PRESSURE: 73 MMHG | SYSTOLIC BLOOD PRESSURE: 119 MMHG

## 2025-05-05 DIAGNOSIS — R42 VERTIGO: ICD-10-CM
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
## 2025-05-05 NOTE — PROGRESS NOTES
The patient location is: [place]  The chief complaint leading to consultation is: follow up since fall    Visit type: audiovisual    Face to Face time with patient: 12 minutes  14 minutes of total time spent on the encounter, which includes face to face time and non-face to face time preparing to see the patient (eg, review of tests), Obtaining and/or reviewing separately obtained history, Documenting clinical information in the electronic or other health record, Independently interpreting results (not separately reported) and communicating results to the patient/family/caregiver, or Care coordination (not separately reported).         Each patient to whom he or she provides medical services by telemedicine is:  (1) informed of the relationship between the physician and patient and the respective role of any other health care provider with respect to management of the patient; and (2) notified that he or she may decline to receive medical services by telemedicine and may withdraw from such care at any time.    Notes:  Subjective:       Patient ID: Anthony Apple is a 64 y.o. female.    Chief Complaint: No chief complaint on file.    History of Present Illness    CHIEF COMPLAINT:  Ms. Apple presents today for follow up after a fall    POST-FALL STATUS:  She reports improvement in rib and breast area pain following fall. Initial headaches resolved with Benadryl and Tylenol. She experiences anxiety when walking on certain surfaces, particularly when stepping out of car onto asphalt and walking across hard floors at home due to fear of falling. Despite these concerns, she can walk 1.4 miles in yard without issues.    VITAL SIGNS:  Home blood pressure 119/73, heart rate 64, and SpO2 99%.    MEDICAL HISTORY:  She has history of sciatica requiring physical therapy and Fuchs' disease causing cloudiness in one eye, particularly noticeable when looking downward.    MEDICATIONS:  She takes Benadryl for allergies particularly  at night, azelastine eye drops for allergy-related eye problems, and Xanax sparingly as needed.      ROS:  Eyes: +blurry vision, +eye itchiness, +eye irritation  Neurological: +headache, -dizziness, +balance issues  Psychiatric: +anxiety  Breasts: +breast pain  Allergic: +allergic reactions         Review of Systems   Constitutional:  Positive for activity change. Negative for unexpected weight change.   HENT:  Negative for hearing loss, rhinorrhea and trouble swallowing.    Eyes:  Negative for discharge and visual disturbance.   Respiratory:  Negative for chest tightness and wheezing.    Cardiovascular:  Negative for chest pain and palpitations.   Gastrointestinal:  Negative for blood in stool, constipation, diarrhea and vomiting.   Endocrine: Negative for polydipsia and polyuria.   Genitourinary:  Negative for difficulty urinating, dysuria, hematuria and menstrual problem.   Musculoskeletal:  Negative for arthralgias, joint swelling and neck pain.   Neurological:  Negative for weakness and headaches.   Psychiatric/Behavioral:  Negative for confusion and dysphoric mood.        Problem List[1]  Anthony has a current medication list which includes the following prescription(s): alendronate, alprazolam, ammonium lactate, ammonium lactate, aspirin, azelastine, azelastine, bepreve, butalbital-acetaminophen-caffeine -40 mg, caltrate 600 plus d, clotrimazole-betamethasone 1-0.05%, cyclobenzaprine, diphenhydramine, flecainide, fluorometholone 0.1%, fluticasone propionate, hydrocortisone, hydrocortisone-pramoxine, loteprednol, mg-plus-protein, metoprolol succinate, montelukast, multivitamin, olopatadine, ondansetron, oxycodone-acetaminophen, pepcid, trazodone, triamcinolone acetonide 0.5%, tyrvaya, urea, urea-hydrophilic cream, and vitamin d3.        Health Maintenance Due   Topic Date Due    RSV Vaccine (Age 60+ and Pregnant patients) (1 - Risk 60-74 years 1-dose series) Never done    Mammogram  03/20/2025      Health  Maintenance reviewed and discussed- waiting to do mammogram until healed from fall.   Objective:      Vitals:    05/05/25 1202   BP: 119/73     There is no height or weight on file to calculate BMI.  Physical Exam  Constitutional:       General: She is not in acute distress.     Appearance: Normal appearance. She is not ill-appearing.   HENT:      Head:      Comments: Bruising under right eye    Pulmonary:      Effort: Pulmonary effort is normal. No respiratory distress.   Neurological:      Mental Status: She is alert.   Psychiatric:         Mood and Affect: Mood normal.         Behavior: Behavior normal.         Thought Content: Thought content normal.         Judgment: Judgment normal.         Assessment:       Assessment & Plan    1. Considered post-concussive symptoms and potential side effects from combination of Astelin and Benadryl, particularly in patients over 50.  2. Assessed fall risk and balance concerns, recommending physical therapy for balance improvement.  3. Evaluated sleep issues, recommending trazodone for regular nighttime use over Xanax due to dependency risk.  4. Suggested transitioning from first-generation antihistamines (Benadryl) to second-generation options (Claritin, Zyrtec, Allegra) for reduced sedation, side effects, and less impact on balance and dizziness.  5. Noted potential vision issues related to Fuchs' disease, considering impact on balance and fall risk.           Plan:       1. Fall at home, subsequent encounter    2. Vertigo  -     Ambulatory Referral/Consult to Physical Therapy; Future; Expected date: 05/12/2025     Suspect anticholinergic side effects.     Recommend adding balance training to PT in [place].     This note was generated with the assistance of ambient listening technology. Verbal consent was obtained by the patient and accompanying visitor(s) for the recording of patient appointment to facilitate this note. I attest to having reviewed and edited the generated  note for accuracy, though some syntax or spelling errors may persist. Please contact the author of this note for any clarification.       [1]   Patient Active Problem List  Diagnosis    Mitral valve prolapse    Family history of colon cancer    Gastroesophageal reflux disease without esophagitis    Hyperthyroidism    Osteoporosis    Seasonal allergies    Panic disorder    Paroxysmal atrial fibrillation    COVID-19    Chondral defect of right patella    Situational depression    Tonsillith    Fall at home, initial encounter

## 2025-05-06 ENCOUNTER — PATIENT MESSAGE (OUTPATIENT)
Dept: FAMILY MEDICINE | Facility: CLINIC | Age: 65
End: 2025-05-06
Payer: COMMERCIAL

## 2025-05-08 ENCOUNTER — TELEPHONE (OUTPATIENT)
Dept: FAMILY MEDICINE | Facility: CLINIC | Age: 65
End: 2025-05-08
Payer: COMMERCIAL

## 2025-05-08 NOTE — TELEPHONE ENCOUNTER
----- Message from Laly sent at 5/8/2025  2:02 PM CDT -----  Contact: zaeher travis  Type: Needs Medical AdviceWho Called:  Zaheer/[facility] Additional Information: Zaheer called very upset stating she has been waiting for orders for a pt, I told Zaheer it was sent to [phone] Zaheer told me that is the phone number not a fax that was when she gave me the fax number of [phone], asking for some orders to be sent to her.

## 2025-05-19 ENCOUNTER — PATIENT MESSAGE (OUTPATIENT)
Dept: FAMILY MEDICINE | Facility: CLINIC | Age: 65
End: 2025-05-19
Payer: COMMERCIAL

## 2025-05-20 ENCOUNTER — HOSPITAL ENCOUNTER (OUTPATIENT)
Dept: RADIOLOGY | Facility: HOSPITAL | Age: 65
Discharge: HOME | End: 2025-05-20
Attending: FAMILY MEDICINE
Payer: COMMERCIAL

## 2025-05-20 ENCOUNTER — OFFICE VISIT (OUTPATIENT)
Dept: FAMILY MEDICINE | Facility: CLINIC | Age: 65
End: 2025-05-20
Payer: COMMERCIAL

## 2025-05-20 VITALS
RESPIRATION RATE: 16 BRPM | HEART RATE: 77 BPM | SYSTOLIC BLOOD PRESSURE: 116 MMHG | BODY MASS INDEX: 25.13 KG/M2 | OXYGEN SATURATION: 99 % | DIASTOLIC BLOOD PRESSURE: 68 MMHG | HEIGHT: 70 IN | WEIGHT: 175.5 LBS

## 2025-05-20 DIAGNOSIS — J30.2 SEASONAL ALLERGIES: Primary | ICD-10-CM

## 2025-05-20 DIAGNOSIS — F41.0 PANIC DISORDER: ICD-10-CM

## 2025-05-20 DIAGNOSIS — R51.9 CHRONIC NONINTRACTABLE HEADACHE, UNSPECIFIED HEADACHE TYPE: ICD-10-CM

## 2025-05-20 DIAGNOSIS — R00.0 TACHYCARDIA: ICD-10-CM

## 2025-05-20 DIAGNOSIS — E05.90 HYPERTHYROIDISM: ICD-10-CM

## 2025-05-20 DIAGNOSIS — R53.83 FATIGUE, UNSPECIFIED TYPE: ICD-10-CM

## 2025-05-20 DIAGNOSIS — R26.89 BALANCE DISORDER: ICD-10-CM

## 2025-05-20 DIAGNOSIS — G89.29 CHRONIC NONINTRACTABLE HEADACHE, UNSPECIFIED HEADACHE TYPE: ICD-10-CM

## 2025-05-20 DIAGNOSIS — S06.0X0A CONCUSSION WITHOUT LOSS OF CONSCIOUSNESS, INITIAL ENCOUNTER: ICD-10-CM

## 2025-05-20 DIAGNOSIS — R25.1 TREMOR: ICD-10-CM

## 2025-05-20 DIAGNOSIS — I48.0 PAROXYSMAL ATRIAL FIBRILLATION: ICD-10-CM

## 2025-05-20 DIAGNOSIS — Z12.31 SCREENING MAMMOGRAM FOR BREAST CANCER: ICD-10-CM

## 2025-05-20 DIAGNOSIS — S06.9X0D TRAUMATIC BRAIN INJURY, WITHOUT LOSS OF CONSCIOUSNESS, SUBSEQUENT ENCOUNTER: ICD-10-CM

## 2025-05-20 PROCEDURE — 99214 OFFICE O/P EST MOD 30 MIN: CPT | Mod: S$GLB,,, | Performed by: STUDENT IN AN ORGANIZED HEALTH CARE EDUCATION/TRAINING PROGRAM

## 2025-05-20 PROCEDURE — 99999 PR PBB SHADOW E&M-EST. PATIENT-LVL V: CPT | Mod: PBBFAC,,, | Performed by: STUDENT IN AN ORGANIZED HEALTH CARE EDUCATION/TRAINING PROGRAM

## 2025-05-20 PROCEDURE — 3008F BODY MASS INDEX DOCD: CPT | Mod: S$GLB,,, | Performed by: STUDENT IN AN ORGANIZED HEALTH CARE EDUCATION/TRAINING PROGRAM

## 2025-05-20 PROCEDURE — 3078F DIAST BP <80 MM HG: CPT | Mod: S$GLB,,, | Performed by: STUDENT IN AN ORGANIZED HEALTH CARE EDUCATION/TRAINING PROGRAM

## 2025-05-20 PROCEDURE — 77063 BREAST TOMOSYNTHESIS BI: CPT | Mod: TC

## 2025-05-20 PROCEDURE — 77067 SCR MAMMO BI INCL CAD: CPT | Mod: 26,,, | Performed by: RADIOLOGY

## 2025-05-20 PROCEDURE — G2211 COMPLEX E/M VISIT ADD ON: HCPCS | Mod: S$GLB,,, | Performed by: STUDENT IN AN ORGANIZED HEALTH CARE EDUCATION/TRAINING PROGRAM

## 2025-05-20 PROCEDURE — 3074F SYST BP LT 130 MM HG: CPT | Mod: S$GLB,,, | Performed by: STUDENT IN AN ORGANIZED HEALTH CARE EDUCATION/TRAINING PROGRAM

## 2025-05-20 PROCEDURE — 1159F MED LIST DOCD IN RCRD: CPT | Mod: S$GLB,,, | Performed by: STUDENT IN AN ORGANIZED HEALTH CARE EDUCATION/TRAINING PROGRAM

## 2025-05-20 PROCEDURE — 77063 BREAST TOMOSYNTHESIS BI: CPT | Mod: 26,,, | Performed by: RADIOLOGY

## 2025-05-20 NOTE — ASSESSMENT & PLAN NOTE
Stable. Continue current medications and regular followup.  Lab Results   Component Value Date    TSH 1.720 08/20/2024

## 2025-05-20 NOTE — PROGRESS NOTES
Subjective:       Patient ID: Anthony Apple is a 64 y.o. female.    Chief Complaint: Follow-up    History of Present Illness    CHIEF COMPLAINT:  Ms. Apple presents today for follow up after a fall in April    FALL INJURY AND SEQUELAE:  She experienced a fall in April after slipping on lotion on her feet while getting out of bed at 5:41 AM. She hit her head and side with significant force, sustaining rib fractures in two places with bruising and pain across breast area. She reports mild persistent rib pain. Following the fall, she developed anxiety about falling again, feelings of imbalance, hand tremors, and extreme fatigue causing dozing while riding. She also experienced temporary hair loss and headaches that resolved after four days.    RECENT CARDIAC EPISODE:  While visiting a nursery on a hot day, her heart rate increased from baseline 80-85 to 127 BPM after being outside for only 4-5 minutes. The episode resolved after resting in the car. She denies prior issues with outdoor activities or walking.    RECENT ANKLE INJURY:  She reports recent ankle injury with bruising and blue discoloration across foot, though mechanism of injury is unclear.    ALLERGIES:  She experiences headaches and neck tension due to allergies, which improve with Benadryl.       Review of Systems   Constitutional:  Negative for activity change and appetite change.   Respiratory:  Negative for shortness of breath.    Cardiovascular:  Positive for palpitations. Negative for chest pain.   Gastrointestinal:  Negative for abdominal pain.   Genitourinary:  Negative for dysuria.   Integumentary:  Negative for rash.   Neurological:  Positive for headaches. Negative for seizures and syncope.   Psychiatric/Behavioral:  Negative for depressed mood and sleep disturbance. The patient is nervous/anxious.       Problem List[1]  Anthony has a current medication list which includes the following prescription(s): alprazolam, ammonium lactate, aspirin,  azelastine, butalbital-acetaminophen-caffeine -40 mg, caltrate 600 plus d, clotrimazole-betamethasone 1-0.05%, cyclobenzaprine, diphenhydramine, flecainide, fluticasone propionate, loteprednol, mg-plus-protein, metoprolol succinate, montelukast, multivitamin, olopatadine, ondansetron, oxycodone-acetaminophen, pepcid, trazodone, triamcinolone acetonide 0.5%, urea, urea-hydrophilic cream, vitamin d3, alendronate, ammonium lactate, azelastine, bepreve, fluorometholone 0.1%, hydrocortisone, hydrocortisone-pramoxine, and tyrvaya.        Health Maintenance Due   Topic Date Due    RSV Vaccine (Age 60+ and Pregnant patients) (1 - Risk 60-74 years 1-dose series) Never done    Mammogram  03/20/2025      Health Maintenance reviewed and discussed- mammogram is ordered and scheduled.     Objective:      Physical Exam  Constitutional:       General: She is not in acute distress.     Appearance: Normal appearance. She is not ill-appearing.   Eyes:      Conjunctiva/sclera: Conjunctivae normal.   Cardiovascular:      Rate and Rhythm: Normal rate and regular rhythm.      Heart sounds: Normal heart sounds. No murmur heard.  Pulmonary:      Effort: Pulmonary effort is normal. No respiratory distress.      Breath sounds: Normal breath sounds. No wheezing or rales.   Musculoskeletal:      Right lower leg: No edema.      Left lower leg: No edema.   Lymphadenopathy:      Cervical: No cervical adenopathy.   Skin:     General: Skin is warm and dry.   Neurological:      Mental Status: She is alert. Mental status is at baseline.      Gait: Gait normal.   Psychiatric:         Mood and Affect: Mood normal.         Behavior: Behavior normal.         Thought Content: Thought content normal.         Judgment: Judgment normal.                 Assessment:       Assessment & Plan    1. Considered post-concussion syndrome as possible diagnosis given persistent symptoms of anxiety, imbalance, tremor, fatigue, and headaches following fall in April.            Plan:       1. Seasonal allergies  Assessment & Plan:  Continue prn treatment      2. Panic disorder  Assessment & Plan:  Uses prn xanax and rarely fills  Having some flair after the fall      3. Chronic nonintractable headache, unspecified headache type  -     Ambulatory referral/consult to Neurology; Future; Expected date: 05/27/2025    4. Balance disorder  -     Vitamin B12; Future; Expected date: 05/20/2025    5. Tremor  -     TSH; Future; Expected date: 05/20/2025  -     Ambulatory referral/consult to Neurology; Future; Expected date: 05/27/2025    6. Fatigue, unspecified type  -     CBC auto differential; Future; Expected date: 05/20/2025  -     Comprehensive metabolic panel; Future; Expected date: 05/20/2025  -     Lipid panel; Future; Expected date: 05/20/2025  -     Vitamin B12; Future; Expected date: 05/20/2025  -     Iron and TIBC; Future; Expected date: 05/20/2025  -     TSH; Future; Expected date: 05/20/2025    7. Tachycardia  -     Holter monitor - 48 hour; Future    8. Traumatic brain injury, without loss of consciousness, subsequent encounter  -     Ambulatory referral/consult to Neurology; Future; Expected date: 05/27/2025    9. Concussion without loss of consciousness, initial encounter  Comments:  suspect this is the reason for many of her ongoing symptoms. neurology evaluation    10. Paroxysmal atrial fibrillation  Assessment & Plan:  Holter to evaluate given her tachycardia      11. Hyperthyroidism  Assessment & Plan:  Stable. Continue current medications and regular followup.  Lab Results   Component Value Date    TSH 1.720 08/20/2024            Visit today included increased complexity associated with the care of the episodic problem concussion, tachycardia addressed and managing the longitudinal care of the patient due to the serious and/or complex managed problem(s) afib, anxiety.      This note was generated with the assistance of ambient listening technology. Verbal consent was  obtained by the patient and accompanying visitor(s) for the recording of patient appointment to facilitate this note. I attest to having reviewed and edited the generated note for accuracy, though some syntax or spelling errors may persist. Please contact the author of this note for any clarification.                    [1]   Patient Active Problem List  Diagnosis    Mitral valve prolapse    Family history of colon cancer    Gastroesophageal reflux disease without esophagitis    Hyperthyroidism    Osteoporosis    Seasonal allergies    Panic disorder    Paroxysmal atrial fibrillation    COVID-19    Chondral defect of right patella    Situational depression    Tonsillith    Fall at home, initial encounter

## 2025-05-21 ENCOUNTER — LAB VISIT (OUTPATIENT)
Dept: LAB | Facility: HOSPITAL | Age: 65
End: 2025-05-21
Attending: STUDENT IN AN ORGANIZED HEALTH CARE EDUCATION/TRAINING PROGRAM
Payer: COMMERCIAL

## 2025-05-21 ENCOUNTER — RESULTS FOLLOW-UP (OUTPATIENT)
Dept: FAMILY MEDICINE | Facility: CLINIC | Age: 65
End: 2025-05-21

## 2025-05-21 DIAGNOSIS — R53.83 FATIGUE, UNSPECIFIED TYPE: ICD-10-CM

## 2025-05-21 DIAGNOSIS — R25.1 TREMOR: ICD-10-CM

## 2025-05-21 DIAGNOSIS — R26.89 BALANCE DISORDER: ICD-10-CM

## 2025-05-21 LAB
ABSOLUTE EOSINOPHIL (OHS): 0.11 K/UL
ABSOLUTE MONOCYTE (OHS): 0.34 K/UL (ref 0.3–1)
ABSOLUTE NEUTROPHIL COUNT (OHS): 1.79 K/UL (ref 1.8–7.7)
ALBUMIN SERPL BCP-MCNC: 4.1 G/DL (ref 3.5–5.2)
ALP SERPL-CCNC: 63 UNIT/L (ref 40–150)
ALT SERPL W/O P-5'-P-CCNC: 20 UNIT/L (ref 10–44)
ANION GAP (OHS): 7 MMOL/L (ref 8–16)
AST SERPL-CCNC: 24 UNIT/L (ref 11–45)
BASOPHILS # BLD AUTO: 0.03 K/UL
BASOPHILS NFR BLD AUTO: 0.8 %
BILIRUB SERPL-MCNC: 0.7 MG/DL (ref 0.1–1)
BUN SERPL-MCNC: 17 MG/DL (ref 8–23)
CALCIUM SERPL-MCNC: 9.7 MG/DL (ref 8.7–10.5)
CHLORIDE SERPL-SCNC: 108 MMOL/L (ref 95–110)
CHOLEST SERPL-MCNC: 170 MG/DL (ref 120–199)
CHOLEST/HDLC SERPL: 3.2 {RATIO} (ref 2–5)
CO2 SERPL-SCNC: 25 MMOL/L (ref 23–29)
CREAT SERPL-MCNC: 0.8 MG/DL (ref 0.5–1.4)
ERYTHROCYTE [DISTWIDTH] IN BLOOD BY AUTOMATED COUNT: 12.5 % (ref 11.5–14.5)
GFR SERPLBLD CREATININE-BSD FMLA CKD-EPI: >60 ML/MIN/1.73/M2
GLUCOSE SERPL-MCNC: 92 MG/DL (ref 70–110)
HCT VFR BLD AUTO: 39.4 % (ref 37–48.5)
HDLC SERPL-MCNC: 53 MG/DL (ref 40–75)
HDLC SERPL: 31.2 % (ref 20–50)
HGB BLD-MCNC: 13 GM/DL (ref 12–16)
IMM GRANULOCYTES # BLD AUTO: 0.01 K/UL (ref 0–0.04)
IMM GRANULOCYTES NFR BLD AUTO: 0.3 % (ref 0–0.5)
IRON SATN MFR SERPL: 18 % (ref 20–50)
IRON SERPL-MCNC: 68 UG/DL (ref 30–160)
LDLC SERPL CALC-MCNC: 104 MG/DL (ref 63–159)
LYMPHOCYTES # BLD AUTO: 1.64 K/UL (ref 1–4.8)
MCH RBC QN AUTO: 32.3 PG (ref 27–31)
MCHC RBC AUTO-ENTMCNC: 33 G/DL (ref 32–36)
MCV RBC AUTO: 98 FL (ref 82–98)
NONHDLC SERPL-MCNC: 117 MG/DL
NUCLEATED RBC (/100WBC) (OHS): 0 /100 WBC
PLATELET # BLD AUTO: 150 K/UL (ref 150–450)
PMV BLD AUTO: 9.6 FL (ref 9.2–12.9)
POTASSIUM SERPL-SCNC: 5.1 MMOL/L (ref 3.5–5.1)
PROT SERPL-MCNC: 7.2 GM/DL (ref 6–8.4)
RBC # BLD AUTO: 4.03 M/UL (ref 4–5.4)
RELATIVE EOSINOPHIL (OHS): 2.8 %
RELATIVE LYMPHOCYTE (OHS): 41.8 % (ref 18–48)
RELATIVE MONOCYTE (OHS): 8.7 % (ref 4–15)
RELATIVE NEUTROPHIL (OHS): 45.6 % (ref 38–73)
SODIUM SERPL-SCNC: 140 MMOL/L (ref 136–145)
T4 FREE SERPL-MCNC: 0.87 NG/DL (ref 0.71–1.51)
TIBC SERPL-MCNC: 373 UG/DL (ref 250–450)
TRANSFERRIN SERPL-MCNC: 252 MG/DL (ref 200–375)
TRIGL SERPL-MCNC: 65 MG/DL (ref 30–150)
TSH SERPL-ACNC: 1.67 UIU/ML (ref 0.4–4)
VIT B12 SERPL-MCNC: 574 PG/ML (ref 210–950)
WBC # BLD AUTO: 3.92 K/UL (ref 3.9–12.7)

## 2025-05-21 PROCEDURE — 36415 COLL VENOUS BLD VENIPUNCTURE: CPT

## 2025-05-21 PROCEDURE — 84443 ASSAY THYROID STIM HORMONE: CPT

## 2025-05-21 PROCEDURE — 80061 LIPID PANEL: CPT

## 2025-05-21 PROCEDURE — 85025 COMPLETE CBC W/AUTO DIFF WBC: CPT

## 2025-05-21 PROCEDURE — 80053 COMPREHEN METABOLIC PANEL: CPT

## 2025-05-21 PROCEDURE — 83540 ASSAY OF IRON: CPT

## 2025-05-21 PROCEDURE — 82607 VITAMIN B-12: CPT

## 2025-05-22 ENCOUNTER — RESULTS FOLLOW-UP (OUTPATIENT)
Dept: FAMILY MEDICINE | Facility: CLINIC | Age: 65
End: 2025-05-22

## 2025-05-22 ENCOUNTER — PATIENT MESSAGE (OUTPATIENT)
Dept: FAMILY MEDICINE | Facility: CLINIC | Age: 65
End: 2025-05-22
Payer: COMMERCIAL

## 2025-05-23 ENCOUNTER — OFFICE VISIT (OUTPATIENT)
Dept: URGENT CARE | Facility: CLINIC | Age: 65
End: 2025-05-23
Payer: COMMERCIAL

## 2025-05-23 ENCOUNTER — HOSPITAL ENCOUNTER (EMERGENCY)
Facility: HOSPITAL | Age: 65
Discharge: LEFT BEFORE DISCHARGE | End: 2025-05-23
Payer: COMMERCIAL

## 2025-05-23 VITALS
BODY MASS INDEX: 24.91 KG/M2 | HEIGHT: 70 IN | DIASTOLIC BLOOD PRESSURE: 82 MMHG | WEIGHT: 174 LBS | OXYGEN SATURATION: 99 % | HEART RATE: 78 BPM | TEMPERATURE: 98 F | SYSTOLIC BLOOD PRESSURE: 148 MMHG | RESPIRATION RATE: 20 BRPM

## 2025-05-23 VITALS
HEIGHT: 70 IN | RESPIRATION RATE: 18 BRPM | DIASTOLIC BLOOD PRESSURE: 85 MMHG | TEMPERATURE: 98 F | WEIGHT: 174 LBS | OXYGEN SATURATION: 98 % | HEART RATE: 76 BPM | BODY MASS INDEX: 24.91 KG/M2 | SYSTOLIC BLOOD PRESSURE: 146 MMHG

## 2025-05-23 DIAGNOSIS — R51.9 NONINTRACTABLE HEADACHE, UNSPECIFIED CHRONICITY PATTERN, UNSPECIFIED HEADACHE TYPE: Primary | ICD-10-CM

## 2025-05-23 PROCEDURE — 99215 OFFICE O/P EST HI 40 MIN: CPT | Mod: S$GLB,,, | Performed by: NURSE PRACTITIONER

## 2025-05-23 PROCEDURE — 99900041 HC LEFT WITHOUT BEING SEEN- EMERGENCY

## 2025-05-23 NOTE — ED TRIAGE NOTES
Patient arrives with headache x 2 days . She had a concussion x 5 weeks ago. Neg CT. Her Md instructed her to to present to the ER for an MRI.

## 2025-05-23 NOTE — PROGRESS NOTES
"Subjective:       Patient ID: Anthony Apple is a 64 y.o. female.    Vitals:  height is 5' 10" (1.778 m) and weight is 78.9 kg (174 lb). Her oral temperature is 98.2 °F (36.8 °C). Her blood pressure is 148/82 (abnormal) and her pulse is 78. Her respiration is 20 and oxygen saturation is 99%.     Chief Complaint: Headache    64-year-old awake, alert, oriented x4, ambulatory female who presents today with complaint of intermittent generalized headaches for the past 5 weeks.  She explains that 5 weeks ago she had a fall and struck her head.  She was evaluated in the emergency room at that time where she had a CT scan of her head that was interpreted by radiology as negative.  Since this time she reports intermittent headaches.  She contacted her PCP concerning her headaches and her PCP recommended that she have a repeat CT scan.         Headache   The current episode started more than 1 month ago. The problem has been waxing and waning. The pain is at a severity of 1/10. The pain is mild. Treatments tried: tylenol. The treatment provided significant relief. Her past medical history is significant for recent head traumas.       Skin:  Negative for erythema.   Neurological:  Positive for headaches.           Objective:      Physical Exam   Constitutional: She is oriented to person, place, and time.  Non-toxic appearance. She does not appear ill. No distress. normal  HENT:   Head: Normocephalic and atraumatic.   Ears:   Right Ear: Tympanic membrane, external ear and ear canal normal.   Left Ear: Tympanic membrane, external ear and ear canal normal.   Mouth/Throat: Mucous membranes are moist. No posterior oropharyngeal erythema. Oropharynx is clear.   Eyes: Pupils are equal, round, and reactive to light. Extraocular movement intact   Neck: Neck supple.   Cardiovascular: Normal rate, regular rhythm, normal heart sounds and normal pulses.   Pulmonary/Chest: Effort normal and breath sounds normal.   Abdominal: Normal " appearance.   Musculoskeletal: Normal range of motion.         General: Normal range of motion.   Neurological: She is alert and oriented to person, place, and time.   Skin: Skin is warm, dry, not diaphoretic, not pale and no rash. No bruising, No erythema and No lesion no jaundice  Psychiatric: Her behavior is normal.   Vitals reviewed.        Past medical history and current medications reviewed.       Assessment:           1. Nonintractable headache, unspecified chronicity pattern, unspecified headache type              Plan:         Nonintractable headache, unspecified chronicity pattern, unspecified headache type           INSTRUCTIONS  To ER now for further evaluation and treatment of post head injury headaches.

## 2025-09-03 ENCOUNTER — OFFICE VISIT (OUTPATIENT)
Dept: FAMILY MEDICINE | Facility: CLINIC | Age: 65
End: 2025-09-03
Payer: COMMERCIAL

## 2025-09-03 ENCOUNTER — HOSPITAL ENCOUNTER (OUTPATIENT)
Dept: RADIOLOGY | Facility: HOSPITAL | Age: 65
Discharge: HOME | End: 2025-09-03
Attending: STUDENT IN AN ORGANIZED HEALTH CARE EDUCATION/TRAINING PROGRAM
Payer: COMMERCIAL

## 2025-09-03 VITALS
HEART RATE: 73 BPM | DIASTOLIC BLOOD PRESSURE: 62 MMHG | WEIGHT: 178.88 LBS | BODY MASS INDEX: 25.61 KG/M2 | SYSTOLIC BLOOD PRESSURE: 112 MMHG | RESPIRATION RATE: 16 BRPM | OXYGEN SATURATION: 97 % | HEIGHT: 70 IN

## 2025-09-03 DIAGNOSIS — R22.1 NECK SWELLING: ICD-10-CM

## 2025-09-03 DIAGNOSIS — E05.90 HYPERTHYROIDISM: Primary | ICD-10-CM

## 2025-09-03 DIAGNOSIS — R53.83 FATIGUE, UNSPECIFIED TYPE: ICD-10-CM

## 2025-09-03 DIAGNOSIS — F41.0 PANIC DISORDER: ICD-10-CM

## 2025-09-03 DIAGNOSIS — Z79.899 LONG-TERM USE OF HIGH-RISK MEDICATION: ICD-10-CM

## 2025-09-03 DIAGNOSIS — R42 VERTIGO: ICD-10-CM

## 2025-09-03 PROCEDURE — 99214 OFFICE O/P EST MOD 30 MIN: CPT | Mod: S$GLB,,, | Performed by: STUDENT IN AN ORGANIZED HEALTH CARE EDUCATION/TRAINING PROGRAM

## 2025-09-03 PROCEDURE — 99999 PR PBB SHADOW E&M-EST. PATIENT-LVL V: CPT | Mod: PBBFAC,,, | Performed by: STUDENT IN AN ORGANIZED HEALTH CARE EDUCATION/TRAINING PROGRAM

## 2025-09-03 PROCEDURE — 3008F BODY MASS INDEX DOCD: CPT | Mod: S$GLB,,, | Performed by: STUDENT IN AN ORGANIZED HEALTH CARE EDUCATION/TRAINING PROGRAM

## 2025-09-03 PROCEDURE — 3078F DIAST BP <80 MM HG: CPT | Mod: S$GLB,,, | Performed by: STUDENT IN AN ORGANIZED HEALTH CARE EDUCATION/TRAINING PROGRAM

## 2025-09-03 PROCEDURE — 1159F MED LIST DOCD IN RCRD: CPT | Mod: S$GLB,,, | Performed by: STUDENT IN AN ORGANIZED HEALTH CARE EDUCATION/TRAINING PROGRAM

## 2025-09-03 PROCEDURE — 3074F SYST BP LT 130 MM HG: CPT | Mod: S$GLB,,, | Performed by: STUDENT IN AN ORGANIZED HEALTH CARE EDUCATION/TRAINING PROGRAM

## 2025-09-03 PROCEDURE — 76536 US EXAM OF HEAD AND NECK: CPT | Mod: TC

## 2025-09-03 PROCEDURE — 76536 US EXAM OF HEAD AND NECK: CPT | Mod: 26,,, | Performed by: RADIOLOGY

## 2025-09-03 RX ORDER — ALPRAZOLAM 0.25 MG/1
0.25 TABLET ORAL DAILY PRN
Qty: 30 TABLET | Refills: 0 | Status: SHIPPED | OUTPATIENT
Start: 2025-09-03